# Patient Record
Sex: FEMALE | Race: AMERICAN INDIAN OR ALASKA NATIVE | URBAN - METROPOLITAN AREA
[De-identification: names, ages, dates, MRNs, and addresses within clinical notes are randomized per-mention and may not be internally consistent; named-entity substitution may affect disease eponyms.]

---

## 2017-03-03 ENCOUNTER — TRANSFERRED RECORDS (OUTPATIENT)
Dept: HEALTH INFORMATION MANAGEMENT | Facility: CLINIC | Age: 57
End: 2017-03-03

## 2017-05-09 ENCOUNTER — TRANSFERRED RECORDS (OUTPATIENT)
Dept: HEALTH INFORMATION MANAGEMENT | Facility: CLINIC | Age: 57
End: 2017-05-09

## 2017-06-12 ENCOUNTER — TRANSFERRED RECORDS (OUTPATIENT)
Dept: HEALTH INFORMATION MANAGEMENT | Facility: CLINIC | Age: 57
End: 2017-06-12

## 2017-06-23 ENCOUNTER — TRANSFERRED RECORDS (OUTPATIENT)
Dept: HEALTH INFORMATION MANAGEMENT | Facility: CLINIC | Age: 57
End: 2017-06-23

## 2017-07-17 ENCOUNTER — TRANSFERRED RECORDS (OUTPATIENT)
Dept: HEALTH INFORMATION MANAGEMENT | Facility: CLINIC | Age: 57
End: 2017-07-17

## 2017-08-14 ENCOUNTER — TELEPHONE (OUTPATIENT)
Dept: TRANSPLANT | Facility: CLINIC | Age: 57
End: 2017-08-14

## 2017-08-15 ENCOUNTER — TRANSFERRED RECORDS (OUTPATIENT)
Dept: HEALTH INFORMATION MANAGEMENT | Facility: CLINIC | Age: 57
End: 2017-08-15

## 2017-08-22 ENCOUNTER — MEDICAL CORRESPONDENCE (OUTPATIENT)
Dept: HEALTH INFORMATION MANAGEMENT | Facility: CLINIC | Age: 57
End: 2017-08-22

## 2017-09-20 ENCOUNTER — TRANSFERRED RECORDS (OUTPATIENT)
Dept: HEALTH INFORMATION MANAGEMENT | Facility: CLINIC | Age: 57
End: 2017-09-20
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
## 2017-12-02 ENCOUNTER — TRANSFERRED RECORDS (OUTPATIENT)
Dept: HEALTH INFORMATION MANAGEMENT | Facility: CLINIC | Age: 57
End: 2017-12-02

## 2017-12-02 PROBLEM — J42 ACUTE EXACERBATION OF CHRONIC BRONCHITIS (CMS/HCC): Status: ACTIVE | Noted: 2017-12-02

## 2017-12-02 PROBLEM — J96.20 ACUTE-ON-CHRONIC RESPIRATORY FAILURE (CMS/HCC): Status: ACTIVE | Noted: 2017-12-02

## 2017-12-02 PROBLEM — I95.9 LOW BLOOD PRESSURE: Status: ACTIVE | Noted: 2017-12-02

## 2017-12-02 PROBLEM — J18.9 PNEUMONIA: Status: ACTIVE | Noted: 2017-12-02

## 2017-12-02 PROBLEM — R09.02 HYPOXEMIA: Status: ACTIVE | Noted: 2017-12-02

## 2017-12-02 PROBLEM — J44.9 CHRONIC OBSTRUCTIVE LUNG DISEASE (CMS/HCC): Status: ACTIVE | Noted: 2017-12-02

## 2017-12-02 PROBLEM — J96.00 ACUTE RESPIRATORY FAILURE (CMS/HCC): Status: ACTIVE | Noted: 2017-12-02

## 2017-12-02 PROBLEM — N39.0 URINARY TRACT INFECTIOUS DISEASE: Status: ACTIVE | Noted: 2017-12-02

## 2017-12-02 PROBLEM — J20.9 ACUTE EXACERBATION OF CHRONIC BRONCHITIS (CMS/HCC): Status: ACTIVE | Noted: 2017-12-02

## 2017-12-02 PROBLEM — J44.1 COPD EXACERBATION (CMS/HCC): Status: ACTIVE | Noted: 2017-12-02

## 2017-12-02 PROBLEM — J40 BRONCHITIS: Status: ACTIVE | Noted: 2017-12-02

## 2017-12-02 PROBLEM — E87.20 LACTIC ACIDOSIS: Status: ACTIVE | Noted: 2017-12-02

## 2017-12-02 PROBLEM — N20.0 KIDNEY STONE: Status: ACTIVE | Noted: 2017-12-02

## 2017-12-02 PROBLEM — R65.10 SYSTEMIC INFLAMMATORY RESPONSE SYNDROME (CMS/HCC): Status: ACTIVE | Noted: 2017-12-02

## 2018-01-08 ENCOUNTER — TRANSFERRED RECORDS (OUTPATIENT)
Dept: HEALTH INFORMATION MANAGEMENT | Facility: CLINIC | Age: 58
End: 2018-01-08

## 2018-01-09 ENCOUNTER — TRANSFERRED RECORDS (OUTPATIENT)
Dept: HEALTH INFORMATION MANAGEMENT | Facility: CLINIC | Age: 58
End: 2018-01-09

## 2018-01-16 ENCOUNTER — DOCUMENTATION (OUTPATIENT)
Dept: PULMONOLOGY | Facility: CLINIC | Age: 58
End: 2018-01-16

## 2018-01-17 NOTE — PROGRESS NOTES
Patient was an inpatient in [facility].  Appears that she was treated with ceftriaxone hypertonic saline nebs continuation of budesonide and albuterol.  Theophylline was continued.  She was given a prednisone taper.  Is also on T ectropium duo nebs.  As well as Advair.  Metabolic panel showed a glucose of 115 BUN 20 creatinine 0.58 sodium 144 potassium 4.2 chloride 100 bicarbonate 36.  White count was 10,200 hemoglobin 10.8 hematocrit 38.8 platelets were 284,000.    Chest x-ray showed some patchy scarring in the left upper lobe and linear scar versus atelectasis in the lung bases.

## 2018-02-06 ENCOUNTER — TELEPHONE (OUTPATIENT)
Dept: PULMONOLOGY | Facility: CLINIC | Age: 58
End: 2018-02-06

## 2018-02-06 DIAGNOSIS — J44.9 CHRONIC OBSTRUCTIVE PULMONARY DISEASE, UNSPECIFIED COPD TYPE (CMS/HCC): Primary | ICD-10-CM

## 2018-02-06 RX ORDER — PREDNISONE 10 MG/1
TABLET ORAL
Qty: 28 TABLET | Refills: 0 | Status: SHIPPED | OUTPATIENT
Start: 2018-02-06 | End: 2018-02-08 | Stop reason: ALTCHOICE

## 2018-02-06 NOTE — PROGRESS NOTES
Pt calls in today. Reports that she has finished the prednisone given to her in [place] in January. She has been feeling worse since. She is quite short of breath, weak, and tired. She wears 3-5 L of oxygen at rest. Apparently she has a tank that goes up to 15LPM which she wears with a mask when getting up to go to the bathroom.   Pt refuses to come into the clinic today or tomorrow. She states her children will  prescriptions for her at the pharmacy. She lives in [place]. I will start her on prednisone 40mg Qday and talk to Dr. Baker tomorrow when he is back in the clinic.

## 2018-02-08 ENCOUNTER — HOSPITAL ENCOUNTER (INPATIENT)
Facility: HOSPITAL | Age: 58
LOS: 9 days | Discharge: HOME | DRG: 189 | End: 2018-02-17
Attending: EMERGENCY MEDICINE | Admitting: INTERNAL MEDICINE
Payer: COMMERCIAL

## 2018-02-08 ENCOUNTER — APPOINTMENT (OUTPATIENT)
Dept: RADIOLOGY | Facility: HOSPITAL | Age: 58
DRG: 189 | End: 2018-02-08
Payer: COMMERCIAL

## 2018-02-08 ENCOUNTER — TRANSFERRED RECORDS (OUTPATIENT)
Dept: HEALTH INFORMATION MANAGEMENT | Facility: CLINIC | Age: 58
End: 2018-02-08

## 2018-02-08 DIAGNOSIS — B37.0 THRUSH: ICD-10-CM

## 2018-02-08 DIAGNOSIS — M25.50 ARTHRALGIA, UNSPECIFIED JOINT: ICD-10-CM

## 2018-02-08 DIAGNOSIS — F41.9 ANXIETY: ICD-10-CM

## 2018-02-08 DIAGNOSIS — J44.1 COPD WITH ACUTE EXACERBATION (CMS/HCC): ICD-10-CM

## 2018-02-08 DIAGNOSIS — I10 ESSENTIAL HYPERTENSION: ICD-10-CM

## 2018-02-08 DIAGNOSIS — J44.1 COPD EXACERBATION (CMS/HCC): Primary | ICD-10-CM

## 2018-02-08 LAB
ALBUMIN SERPL-MCNC: 4 G/DL (ref 3.5–5.3)
ALP SERPL-CCNC: 69 U/L (ref 41–108)
ALT SERPL-CCNC: 26 U/L (ref 0–52)
ANION GAP SERPL CALC-SCNC: 8 MMOL/L (ref 3–11)
ANISOCYTOSIS PRESENCE IN BLOOD, ANALYZER: ABNORMAL
AST SERPL-CCNC: 15 U/L (ref 0–39)
B PERT DNA SPEC QL NAA+PROBE: NEGATIVE
BASE EXCESS BLDA CALC-SCNC: 2.7 MMOL/L (ref -2–2)
BASOPHILS # BLD AUTO: 0 10*3/UL
BASOPHILS NFR BLD AUTO: 0 % (ref 0–2)
BILIRUB SERPL-MCNC: 0.34 MG/DL (ref 0–1.4)
BUN SERPL-MCNC: 17 MG/DL (ref 7–25)
C PNEUM DNA SPEC QL NAA+PROBE: NEGATIVE
CALCIUM ALBUM COR SERPL-MCNC: 9.5 MG/DL (ref 8.5–10.1)
CALCIUM SERPL-MCNC: 9.5 MG/DL (ref 8.6–10.3)
CHLORIDE SERPL-SCNC: 103 MMOL/L (ref 98–107)
CO2 BLDA-SCNC: 29.5 MMOL/L (ref 19–24)
CO2 SERPL-SCNC: 30 MMOL/L (ref 21–32)
CREAT SERPL-MCNC: 0.7 MG/DL (ref 0.6–1.2)
DEVICE (RT): ABNORMAL
EOSINOPHIL # BLD AUTO: 0 10*3/UL
EOSINOPHIL NFR BLD AUTO: 0 % (ref 0–3)
ERYTHROCYTE [DISTWIDTH] IN BLOOD BY AUTOMATED COUNT: 17.3 % (ref 11.5–14)
FLOW: 6 L/M
FLUAV RNA SPEC NAA+PROBE-ACNC: NEGATIVE
FLUBV RNA SPEC QL NAA+PROBE: NEGATIVE
GFR SERPL CREATININE-BSD FRML MDRD: 87 ML/MIN/1.73M*2
GLUCOSE SERPL-MCNC: 144 MG/DL (ref 70–105)
HADV DNA SPEC QL NAA+PROBE: NEGATIVE
HCO3 BLDA-SCNC: 28 MMOL/L (ref 23–29)
HCOV 229E RNA SPEC QL NAA+PROBE: NEGATIVE
HCOV HKU1 RNA SPEC QL NAA+PROBE: NEGATIVE
HCOV NL63 RNA SPEC QL NAA+PROBE: NEGATIVE
HCOV OC43 RNA SPEC QL NAA+PROBE: NEGATIVE
HCT VFR BLD AUTO: 40.3 % (ref 34–45)
HGB BLD-MCNC: 13.8 G/DL (ref 11.5–15.5)
HMPV RNA ISLT QL NAA+PROBE: NEGATIVE
HPIV1 RNA SPEC QL NAA+PROBE: NEGATIVE
HPIV2 RNA SPEC QL NAA+PROBE: NEGATIVE
HPIV3 RNA SPEC QL NAA+PROBE: NEGATIVE
HPIV4 RNA SPEC QL NAA+PROBE: NEGATIVE
LACTATE BLDV-SCNC: 0.75 MMOL/L (ref 0.5–1.99)
LYMPHOCYTES # BLD AUTO: 0.5 10*3/UL
LYMPHOCYTES NFR BLD AUTO: 6 % (ref 11–47)
M PNEUMO DNA # SPEC NAA+PROBE: NEGATIVE {COPIES}/ML
MCH RBC QN AUTO: 28 PG (ref 28–33)
MCHC RBC AUTO-ENTMCNC: 34.1 G/DL (ref 32–36)
MCV RBC AUTO: 82.1 FL (ref 81–97)
MODE (RT): ABNORMAL
MONOCYTES # BLD AUTO: 0.4 10*3/UL
MONOCYTES NFR BLD AUTO: 5 % (ref 3–11)
NEUTROPHILS # BLD AUTO: 7.8 10*3/UL
NEUTROPHILS NFR BLD AUTO: 89 % (ref 41–81)
PCO2 BLDA: 46.4 MMHG (ref 35–45)
PEEP SET (RT): 6 CM/H2O
PH BLDA: 7.4 [PH] (ref 7.35–7.45)
PIP SET (RT): 12 CM/H2O
PLATELET # BLD AUTO: 271 10*3/UL (ref 140–350)
PMV BLD AUTO: 6.4 FL (ref 6.9–10.8)
PO2 BLDA: 91.6 MMHG (ref 60–80)
POCT CTO2, ARTERIAL: 17.6 ML/DL (ref 15–24)
POCT HEMOGLOBIN (MEASURED), ARTERIAL: 13 G/DL (ref 11.5–15.5)
POCT OXYHEMOGLOBIN, ARTERIAL: 96 % (ref 94–97)
POTASSIUM SERPL-SCNC: 4.3 MMOL/L (ref 3.5–5.1)
PROT SERPL-MCNC: 6.8 G/DL (ref 6–8.3)
RBC # BLD AUTO: 4.9 10*6/ΜL (ref 3.7–5.3)
RR TOTAL (RT): 30 B/MIN
RSV A RNA SPEC QL NAA+PROBE: POSITIVE
RV+EV RNA SPEC QL NAA+PROBE: NEGATIVE
SODIUM SERPL-SCNC: 141 MMOL/L (ref 135–145)
SPO2 (RT): 97 %
WBC # BLD AUTO: 8.8 10*3/UL (ref 4.5–10.5)

## 2018-02-08 PROCEDURE — 94664 DEMO&/EVAL PT USE INHALER: CPT

## 2018-02-08 PROCEDURE — 71045 X-RAY EXAM CHEST 1 VIEW: CPT

## 2018-02-08 PROCEDURE — 94644 CONT INHLJ TX 1ST HOUR: CPT

## 2018-02-08 PROCEDURE — 6370000100 HC RX 637 (ALT 250 FOR IP): Performed by: EMERGENCY MEDICINE

## 2018-02-08 PROCEDURE — 94640 AIRWAY INHALATION TREATMENT: CPT

## 2018-02-08 PROCEDURE — 2580000300 HC RX 258: Performed by: EMERGENCY MEDICINE

## 2018-02-08 PROCEDURE — 87633 RESP VIRUS 12-25 TARGETS: CPT | Performed by: EMERGENCY MEDICINE

## 2018-02-08 PROCEDURE — 6360000200 HC RX 636 W HCPCS (ALT 250 FOR IP)

## 2018-02-08 PROCEDURE — 36415 COLL VENOUS BLD VENIPUNCTURE: CPT | Performed by: EMERGENCY MEDICINE

## 2018-02-08 PROCEDURE — 80053 COMPREHEN METABOLIC PANEL: CPT | Performed by: EMERGENCY MEDICINE

## 2018-02-08 PROCEDURE — (BLANK) HC ROOM ICU INTERMEDIATE TRANSITIONAL CARE

## 2018-02-08 PROCEDURE — 36600 WITHDRAWAL OF ARTERIAL BLOOD: CPT

## 2018-02-08 PROCEDURE — 93005 ELECTROCARDIOGRAM TRACING: CPT

## 2018-02-08 PROCEDURE — 94660 CPAP INITIATION&MGMT: CPT

## 2018-02-08 PROCEDURE — 83605 ASSAY OF LACTIC ACID: CPT

## 2018-02-08 PROCEDURE — 99223 1ST HOSP IP/OBS HIGH 75: CPT | Mod: AI | Performed by: INTERNAL MEDICINE

## 2018-02-08 PROCEDURE — 85025 COMPLETE CBC W/AUTO DIFF WBC: CPT | Performed by: EMERGENCY MEDICINE

## 2018-02-08 PROCEDURE — 82805 BLOOD GASES W/O2 SATURATION: CPT | Mod: QW

## 2018-02-08 PROCEDURE — 6360000200 HC RX 636 W HCPCS (ALT 250 FOR IP): Performed by: EMERGENCY MEDICINE

## 2018-02-08 RX ORDER — LEVALBUTEROL 1.25 MG/.5ML
2.5 SOLUTION, CONCENTRATE RESPIRATORY (INHALATION) ONCE
Status: COMPLETED | OUTPATIENT
Start: 2018-02-08 | End: 2018-02-08

## 2018-02-08 RX ORDER — LEVALBUTEROL 1.25 MG/.5ML
SOLUTION, CONCENTRATE RESPIRATORY (INHALATION)
Status: DISCONTINUED
Start: 2018-02-08 | End: 2018-02-17 | Stop reason: HOSPADM

## 2018-02-08 RX ORDER — POLYVINYL ALCOHOL 14 MG/ML
1-2 SOLUTION/ DROPS OPHTHALMIC AS NEEDED
Status: DISCONTINUED | OUTPATIENT
Start: 2018-02-08 | End: 2018-02-12

## 2018-02-08 RX ORDER — GUAIFENESIN 600 MG/1
600 TABLET, EXTENDED RELEASE ORAL 2 TIMES DAILY
Status: ON HOLD | COMMUNITY
End: 2018-02-17

## 2018-02-08 RX ORDER — ONDANSETRON HYDROCHLORIDE 2 MG/ML
4 INJECTION, SOLUTION INTRAVENOUS EVERY 6 HOURS PRN
Status: DISCONTINUED | OUTPATIENT
Start: 2018-02-08 | End: 2018-02-09

## 2018-02-08 RX ORDER — MAGNESIUM SULFATE HEPTAHYDRATE 40 MG/ML
2 INJECTION, SOLUTION INTRAVENOUS ONCE AS NEEDED
Status: DISCONTINUED | OUTPATIENT
Start: 2018-02-08 | End: 2018-02-17 | Stop reason: HOSPADM

## 2018-02-08 RX ORDER — SODIUM CHLORIDE 9 MG/ML
500 INJECTION, SOLUTION INTRAVENOUS ONCE
Status: COMPLETED | OUTPATIENT
Start: 2018-02-08 | End: 2018-02-08

## 2018-02-08 RX ORDER — IPRATROPIUM BROMIDE AND ALBUTEROL SULFATE 2.5; .5 MG/3ML; MG/3ML
9 SOLUTION RESPIRATORY (INHALATION) ONCE
Status: COMPLETED | OUTPATIENT
Start: 2018-02-08 | End: 2018-02-08

## 2018-02-08 RX ADMIN — LEVALBUTEROL 2.5 MG: 1.25 SOLUTION, CONCENTRATE RESPIRATORY (INHALATION) at 18:00

## 2018-02-08 RX ADMIN — LEVALBUTEROL 2.5 MG: 1.25 SOLUTION, CONCENTRATE RESPIRATORY (INHALATION) at 16:47

## 2018-02-08 RX ADMIN — METHYLPREDNISOLONE SODIUM SUCCINATE 125 MG: 125 INJECTION, POWDER, FOR SOLUTION INTRAMUSCULAR; INTRAVENOUS at 18:56

## 2018-02-08 RX ADMIN — IPRATROPIUM BROMIDE AND ALBUTEROL SULFATE 9 ML: .5; 3 SOLUTION RESPIRATORY (INHALATION) at 15:52

## 2018-02-08 RX ADMIN — SODIUM CHLORIDE 1000 ML: 9 INJECTION, SOLUTION INTRAVENOUS at 17:21

## 2018-02-08 RX ADMIN — METHYLPREDNISOLONE SODIUM SUCCINATE 125 MG: 125 INJECTION, POWDER, FOR SOLUTION INTRAMUSCULAR; INTRAVENOUS at 15:44

## 2018-02-08 NOTE — MEDICATION HISTORY SPECIALIST NOTES
CSN: [id number]  : [id number]    Information sources:  EPIC      Source verified by: patient in ER    Allergies verified. Patient in ER      Patient unable to take all of her medication, was only able to use 1/2 of nebulizing solution this morning

## 2018-02-08 NOTE — ED PROVIDER NOTES
Shortness of Breath (cough, congestion for 1 week, worsening shortness of breath today)        HPI:    Patient is a 57 y.o. female who presents with shortness of breath for the last week.  The patient has a history of COPD.  She denies a history of heart failure.  She states that her symptoms started gradually Time of  onset 1 week and is aggravated by nothing and alleviated by nothing.  Her symptom is associated with fast heart rate.      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     Social History Main Topics   • Smoking status: Former Smoker     Packs/day: 1.00     Quit date:    • Smokeless tobacco: Former User   • Alcohol use No   • Drug use: No   • Sexual activity: Defer     Other Topics Concern   • Not on file     Social History Narrative   • No narrative on file       Family History   Problem Relation Age of Onset   • Diabetes Mother    • Hypertension Father    • Cataracts Father    • Other Father      Benign prostatic hypertrophy witout outflow obstruction   • Heart disease Brother         Allergies   Allergen Reactions   • Diphenhydramine      DENIES   • Cefuroxime      SUNBURN   • Cefuroxime Axetil    • Diphenhydramine Hcl    • Erythromycin Lactobionate    • Methylphenidate      ON FIRE   • Metoclopramide      dizzy, sweating   • Propoxyphene    • Propoxyphene N-Acetaminophen    • Tramadol      PARALYSIS   • Codeine      oxygen gets really low   • Erythromycin Base      GI UPSET   • Ibuprofen      DENIES   • Latex      DRY MOUTH         Current Outpatient Prescriptions:   •  albuterol 2.5 mg /3 mL nebulizer solution, Take 3 mL (2.5 mg total) by nebulization every 6 (six) hours as needed for wheezing., Disp: 75 mL, Rfl: 1  •  alendronate (FOSAMAX) 70 mg tablet, Take 70 mg by mouth every 7 (seven) days. THURSDAY , Disp: , Rfl:   •  budesonide (PULMICORT) 1 mg/2 mL nebulizer solution, Take 2 mL by nebulization 2 (two) times a day.  , Disp:  mL, Rfl:   •  ergocalciferol (VITAMIN D2) 50,000 unit capsule, Take 50,000 Units by mouth once a week. THURSDAY, Disp: , Rfl:   •  fexofenadine (ALLEGRA) 60 mg tablet, Take 60 mg by mouth 3 (three) times a day.  , Disp: , Rfl:   •  fluticasone (FLONASE) 50 mcg/actuation nasal spray, Administer 1 spray into each nostril 3 (three) times a day.  , Disp: , Rfl:   •  fluticasone-salmeterol (ADVAIR DISKUS) 500-50 mcg/dose diskus inhaler, Inhale 2 (two) times a day.  , Disp: , Rfl:   •  montelukast (SINGULAIR) 10 mg tablet, Take 10 mg by mouth nightly.  , Disp: , Rfl:   •  multivitamin (THERAGRAN) tablet tablet, Take 1 tablet by mouth daily with lunch.  , Disp: , Rfl:   •  omeprazole (PriLOSEC) 20 mg capsule, Take 20 mg by mouth 2 (two) times a day.  , Disp: , Rfl:   •  predniSONE (DELTASONE) 10 mg tablet, Take 10 mg by mouth daily as needed. Left over prednisone , Disp: , Rfl:   •  roflumilast (DALIRESP) 500 mcg tablet, Take 500 mcg by mouth daily with lunch., Disp: , Rfl:   •  theophylline (THEODUR) 200 mg 12 hr tablet, Take 200 mg by mouth 2 (two) times a day.  ,  Disp: , Rfl:   •  ascorbic acid, vitamin C, (VITAMIN C) 500 mg tablet, Take 500 mg by mouth 2 (two) times a day.  , Disp: , Rfl:   •  docusate sodium (COLACE) 100 mg capsule, Take 100 mg by mouth 2 (two) times a day. Takes after lunch and at bedtime , Disp: , Rfl:   •  guaiFENesin (MUCINEX) 600 mg 12 hr tablet, Take 600 mg by mouth 2 (two) times a day., Disp: , Rfl:   •  tiotropium (SPIRIVA WITH HANDIHALER) 1 capsule = per inhalation capsule, Place 1 capsule into inhaler and inhale every morning., Disp: , Rfl:       ROS:  Constitutional: negative for fever  Eyes: negative for eye pain  ENT: negative for sore throat  Cardiovascular: negative for chest pain  Respiratory: negative for hemoptysis  GI: negative for abdominal pain  : negative for hematuria  Musculoskeletal: negative for back pain  Neuro: negative for headache  Hematology: negative for bleeding  Immunology: negative for joint pain      ED Triage Vitals   Temp Heart Rate Resp BP SpO2   02/08/18 1526 02/08/18 1526 02/08/18 1526 02/08/18 1526 02/08/18 1526   37.3 °C (99.1 °F) (!) 140 (!) 40 172/100 97 %      Temp Source Heart Rate Source Patient Position BP Location FiO2 (%)   02/08/18 1526 -- 02/08/18 1548 -- --   Oral  Head of bed 30 degrees or higher           Physical Exam:  Nursing note and vitals reviewed.  Constitutional: appears well-developed, in obvious respiratory distress.   HENT:   Head: Normocephalic and atraumatic.   Eyes: Pupils are equal, round, and reactive to light.   Neck: Supple, no lymphadenopathy  Cardiovascular: Regular rate and rhythm with no murmur, rub, or gallop.  Normal pulses.  Pulmonary/Chest: severe respiratory distress.  Decreased breath sounds, inspiratory and expiratory wheezing  Abdominal: Soft and nontender.    Back: No CVA tenderness.  Musculoskeletal: No edema  Neurological: Alert.   Skin: Skin is warm and dry. No rash noted.   Psychiatric: Normal mood and affect.           Labs:  Labs Reviewed   CBC WITH AUTO  "DIFFERENTIAL - Abnormal        Result Value    WBC 8.8      RBC 4.90      Hemoglobin 13.8      Hematocrit 40.3      MCV 82.1      MCH 28.0      MCHC 34.1      RDW 17.3 (*)     Platelets 271      MPV 6.4 (*)     Neutrophils% 89 (*)     Lymphocytes% 6 (*)     Monocytes% 5      Eosinophils% 0      Basophils% 0      Neutrophils Absolute 7.80      Lymphocytes Absolute 0.50      Monocytes Absolute 0.40      Eosinophils Absolute 0.00      Basophils Absolute 0.00      Anisocytosis 1+     COMPREHENSIVE METABOLIC PANEL - Abnormal     Sodium 141      Potassium 4.3      Chloride 103      CO2 30      Anion Gap 8      BUN 17      Creatinine 0.7      Glucose 144 (*)     Calcium 9.5      AST 15      ALT (SGPT) 26      Alkaline Phosphatase 69      Total Protein 6.8      Albumin 4.0      Total Bilirubin 0.34      eGFR 87      Corrected Calcium 9.5      Narrative:     ESTIMATED GFR CALCULATED USING THE IDMS-TRACEABLE MDRD STUDY EQUATION WITH THE RESULT NORMALIZED TO 1.73M^2 BODY SURFACE AREA.\"    AVERAGE GFR BY AGE RANGE    20 - 30                                  116  30 - 40                                  107  40 - 50                                  99  50 - 60                                  93  60 - 70                                  85  70 - up                                  75   POCT LACTIC ACID (LACTATE) VENOUS RESULTS ONLY - Normal    POC Lactate 0.75     POCT LACTIC ACID (LACTATE)   RESPIRATORY PATHOGEN PANEL, PCR         Imaging:  X-ray chest portable 1 view   Final Result    Emphysema.                MDM:    The patient is in obvious respiratory distress.  3 DuoNeb's will be given she will be given Solu-Medrol chest x-ray and labs will be done.  She will require admission.    The high probability of sudden, clinically significant, or life-threatening deterioration required my full and direct attention, intervention, and personal management while the patient was critical.  I provided critical care services including " those noted below.    30 minutes in addition to separately billable procedures.    The patient was still quite tachypneic after 3 DuoNeb's and given a Xopenex neb.  The case was discussed with the hospitalist who will admit her.    Given   Medications   levalbuterol (XOPENEX) 1.25 mg/0.5 mL nebulizer solution (not administered)   ipratropium-albuterol (DUO-NEB) 0.5-2.5 mg/3 mL nebulizer solution 9 mL (9 mL nebulization Given 2/8/18 5632)   methylPREDNISolone sod suc(PF) (Solu-MEDROL) injection 125 mg (125 mg intravenous Given 2/8/18 1544)   levalbuterol (XOPENEX) 1.25 mg/0.5 mL nebulizer solution 2.5 mg (2.5 mg nebulization Given 2/8/18 1647)       ED Course          ECG 12 lead, Shortness of breath  Date/Time: 2/8/2018 3:39 PM  Performed by: HUA CARRASCO  Authorized by: HUA CARRASCO     Comments:      Sinus tachycardia rate of 142 wandering baseline, extrasystoles              Clinical Impression:  Final diagnoses:   [J44.1] COPD exacerbation (CMS/[facility])           A voice recognition program was used to aid in documentation of this record.  Sometimes words are not printed exactly as they were spoken.  While efforts were made to carefully edit and correct any inaccuracies, some areas may be present; please take these into context.  Please contact the provider if areas are identified.         Hua Carrasco MD  02/08/18 0301

## 2018-02-08 NOTE — Clinical Note
Level of Care: Intensive Care Transitional [23]   Admitting Provider: JC PATTERSON [[id number]]   Special bed requests:: Telemetry

## 2018-02-09 LAB
ALBUMIN SERPL-MCNC: 3.5 G/DL (ref 3.5–5.3)
ALP SERPL-CCNC: 63 U/L (ref 41–108)
ALT SERPL-CCNC: 22 U/L (ref 0–52)
ANION GAP SERPL CALC-SCNC: 11 MMOL/L (ref 3–11)
ANISOCYTOSIS PRESENCE IN BLOOD, ANALYZER: ABNORMAL
AST SERPL-CCNC: 11 U/L (ref 0–39)
BASE EXCESS BLDA CALC-SCNC: 5.9 MMOL/L (ref -2–2)
BASOPHILS # BLD AUTO: 0 10*3/UL
BASOPHILS # BLD AUTO: 0 10*3/UL
BASOPHILS NFR BLD AUTO: 0 % (ref 0–2)
BASOPHILS NFR BLD AUTO: 0 % (ref 0–2)
BILIRUB SERPL-MCNC: 0.32 MG/DL (ref 0–1.4)
BUN SERPL-MCNC: 21 MG/DL (ref 7–25)
CALCIUM ALBUM COR SERPL-MCNC: 2.4 MG/DL (ref 1.8–2.4)
CALCIUM ALBUM COR SERPL-MCNC: 9.2 MG/DL (ref 8.5–10.1)
CALCIUM SERPL-MCNC: 8.8 MG/DL (ref 8.6–10.3)
CHLORIDE SERPL-SCNC: 102 MMOL/L (ref 98–107)
CO2 BLDA-SCNC: 33.6 MMOL/L (ref 19–24)
CO2 SERPL-SCNC: 29 MMOL/L (ref 21–32)
CREAT SERPL-MCNC: 0.6 MG/DL (ref 0.6–1.2)
DEVICE (RT): ABNORMAL
EOSINOPHIL # BLD AUTO: 0 10*3/UL
EOSINOPHIL # BLD AUTO: 0 10*3/UL
EOSINOPHIL NFR BLD AUTO: 0 % (ref 0–3)
EOSINOPHIL NFR BLD AUTO: 0 % (ref 0–3)
ERYTHROCYTE [DISTWIDTH] IN BLOOD BY AUTOMATED COUNT: 17.3 % (ref 11.5–14)
ERYTHROCYTE [DISTWIDTH] IN BLOOD BY AUTOMATED COUNT: 17.5 % (ref 11.5–14)
ERYTHROCYTE [DISTWIDTH] IN BLOOD BY AUTOMATED COUNT: 17.5 % (ref 11.5–14)
FIO2: 100 %
GFR SERPL CREATININE-BSD FRML MDRD: 104 ML/MIN/1.73M*2
GLUCOSE BLDC GLUCOMTR-MCNC: 132 MG/DL (ref 70–105)
GLUCOSE BLDC GLUCOMTR-MCNC: 155 MG/DL (ref 70–105)
GLUCOSE BLDC GLUCOMTR-MCNC: 163 MG/DL (ref 70–105)
GLUCOSE BLDC GLUCOMTR-MCNC: 192 MG/DL (ref 70–105)
GLUCOSE SERPL-MCNC: 149 MG/DL (ref 70–105)
HCO3 BLDA-SCNC: 32 MMOL/L (ref 23–29)
HCT VFR BLD AUTO: 37.4 % (ref 34–45)
HCT VFR BLD AUTO: 37.4 % (ref 34–45)
HCT VFR BLD AUTO: 37.9 % (ref 34–45)
HGB BLD-MCNC: 12.2 G/DL (ref 11.5–15.5)
HGB BLD-MCNC: 12.3 G/DL (ref 11.5–15.5)
HGB BLD-MCNC: 12.3 G/DL (ref 11.5–15.5)
HYPOCHROMIA PRESENCE IN BLOOD, ANALYZER: ABNORMAL
HYPOCHROMIA PRESENCE IN BLOOD, ANALYZER: ABNORMAL
LYMPHOCYTES # BLD AUTO: 0.8 10*3/UL
LYMPHOCYTES # BLD AUTO: 1 10*3/UL
LYMPHOCYTES # BLD MANUAL: 0.6 10*3/UL
LYMPHOCYTES NFR BLD AUTO: 10 % (ref 11–47)
LYMPHOCYTES NFR BLD AUTO: 7 % (ref 11–47)
LYMPHOCYTES NFR BLD MANUAL: 5 % (ref 11–47)
MAGNESIUM SERPL-MCNC: 2.4 MG/DL (ref 1.8–2.4)
MCH RBC QN AUTO: 26.7 PG (ref 28–33)
MCH RBC QN AUTO: 26.9 PG (ref 28–33)
MCH RBC QN AUTO: 27.4 PG (ref 28–33)
MCHC RBC AUTO-ENTMCNC: 32.4 G/DL (ref 32–36)
MCHC RBC AUTO-ENTMCNC: 32.6 G/DL (ref 32–36)
MCHC RBC AUTO-ENTMCNC: 32.9 G/DL (ref 32–36)
MCV RBC AUTO: 82.5 FL (ref 81–97)
MCV RBC AUTO: 82.7 FL (ref 81–97)
MCV RBC AUTO: 83.1 FL (ref 81–97)
MODE (RT): ABNORMAL
MONOCYTES # BLD AUTO: 0.4 10*3/UL
MONOCYTES # BLD AUTO: 0.6 10*3/UL
MONOCYTES # BLD MANUAL: 0.5 10*3/UL
MONOCYTES NFR BLD AUTO: 4 % (ref 3–11)
MONOCYTES NFR BLD AUTO: 5 % (ref 3–11)
MONOCYTES NFR BLD MANUAL: 4 % (ref 3–11)
MV (RT): 12.1 L/MIN
NEUTROPHILS # BLD AUTO: 11.7 10*3/UL
NEUTROPHILS # BLD AUTO: 7 10*3/UL
NEUTROPHILS # BLD MANUAL: 11.56 10*3/UL
NEUTROPHILS NFR BLD AUTO: 86 % (ref 41–81)
NEUTROPHILS NFR BLD AUTO: 88 % (ref 41–81)
NEUTS SEG # BLD MANUAL: 11.6 10*3/UL
NEUTS SEG NFR BLD MANUAL: 91 % (ref 41–81)
PCO2 BLDA: 53.3 MMHG (ref 35–45)
PEEP SET (RT): 5 CM/H2O
PH BLDA: 7.4 [PH] (ref 7.35–7.45)
PHOSPHATE SERPL-MCNC: 3.4 MG/DL (ref 2.5–4.9)
PLATELET # BLD AUTO: 251 10*3/UL (ref 140–350)
PLATELET # BLD AUTO: 263 10*3/UL (ref 140–350)
PLATELET # BLD AUTO: 277 10*3/UL (ref 140–350)
PLATELET # BLD EST: ADEQUATE 10*3/UL
PMV BLD AUTO: 6.3 FL (ref 6.9–10.8)
PMV BLD AUTO: 6.6 FL (ref 6.9–10.8)
PMV BLD AUTO: 6.7 FL (ref 6.9–10.8)
PO2 BLDA: 353.7 MMHG (ref 60–80)
POCT CTO2, ARTERIAL: 17.3 ML/DL (ref 15–24)
POCT HEMOGLOBIN (MEASURED), ARTERIAL: 11.7 G/DL (ref 11.5–15.5)
POCT OXYHEMOGLOBIN, ARTERIAL: 99 % (ref 94–97)
POTASSIUM SERPL-SCNC: 3.9 MMOL/L (ref 3.5–5.1)
PROT SERPL-MCNC: 5.9 G/DL (ref 6–8.3)
PS (RT): 8 CM/H2O
RBC # BLD AUTO: 4.5 10*6/ΜL (ref 3.7–5.3)
RBC # BLD AUTO: 4.53 10*6/ΜL (ref 3.7–5.3)
RBC # BLD AUTO: 4.59 10*6/ΜL (ref 3.7–5.3)
RBC MORPH BLD: NORMAL
RR TOTAL (RT): 23 B/MIN
SODIUM SERPL-SCNC: 142 MMOL/L (ref 135–145)
SPO2 (RT): 100 %
TOTAL CELLS COUNTED BLD: 100 CELLS
VT OBSERVED (RT): 546 ML
WBC # BLD AUTO: 12.7 10*3/UL (ref 4.5–10.5)
WBC # BLD AUTO: 13.3 10*3/UL (ref 4.5–10.5)
WBC # BLD AUTO: 8.1 10*3/UL (ref 4.5–10.5)
WBC NRBC COR # BLD: 12.7 10*3/UL

## 2018-02-09 PROCEDURE — 36569 INSJ PICC 5 YR+ W/O IMAGING: CPT

## 2018-02-09 PROCEDURE — 85025 COMPLETE CBC W/AUTO DIFF WBC: CPT | Performed by: INTERNAL MEDICINE

## 2018-02-09 PROCEDURE — 96376 TX/PRO/DX INJ SAME DRUG ADON: CPT

## 2018-02-09 PROCEDURE — 94660 CPAP INITIATION&MGMT: CPT

## 2018-02-09 PROCEDURE — 6360000200 HC RX 636 W HCPCS (ALT 250 FOR IP)

## 2018-02-09 PROCEDURE — 94002 VENT MGMT INPAT INIT DAY: CPT

## 2018-02-09 PROCEDURE — 6360000200 HC RX 636 W HCPCS (ALT 250 FOR IP): Performed by: INTERNAL MEDICINE

## 2018-02-09 PROCEDURE — 97535 SELF CARE MNGMENT TRAINING: CPT

## 2018-02-09 PROCEDURE — 80053 COMPREHEN METABOLIC PANEL: CPT | Performed by: INTERNAL MEDICINE

## 2018-02-09 PROCEDURE — 2580000300 HC RX 258: Performed by: INTERNAL MEDICINE

## 2018-02-09 PROCEDURE — 99291 CRITICAL CARE FIRST HOUR: CPT | Performed by: INTERNAL MEDICINE

## 2018-02-09 PROCEDURE — 96374 THER/PROPH/DIAG INJ IV PUSH: CPT

## 2018-02-09 PROCEDURE — 05HY33Z INSERTION OF INFUSION DEVICE INTO UPPER VEIN, PERCUTANEOUS APPROACH: ICD-10-PCS | Performed by: INTERNAL MEDICINE

## 2018-02-09 PROCEDURE — 6370000100 HC RX 637 (ALT 250 FOR IP)

## 2018-02-09 PROCEDURE — 36415 COLL VENOUS BLD VENIPUNCTURE: CPT | Performed by: INTERNAL MEDICINE

## 2018-02-09 PROCEDURE — 94645 CONT INHLJ TX EACH ADDL HOUR: CPT

## 2018-02-09 PROCEDURE — (BLANK) HC ROOM ICU INTERMEDIATE TRANSITIONAL CARE

## 2018-02-09 PROCEDURE — 99284 EMERGENCY DEPT VISIT MOD MDM: CPT

## 2018-02-09 PROCEDURE — 82962 GLUCOSE BLOOD TEST: CPT

## 2018-02-09 PROCEDURE — 97166 OT EVAL MOD COMPLEX 45 MIN: CPT

## 2018-02-09 PROCEDURE — 84100 ASSAY OF PHOSPHORUS: CPT | Performed by: INTERNAL MEDICINE

## 2018-02-09 PROCEDURE — 82805 BLOOD GASES W/O2 SATURATION: CPT | Mod: QW

## 2018-02-09 PROCEDURE — 6370000100 HC RX 637 (ALT 250 FOR IP): Performed by: INTERNAL MEDICINE

## 2018-02-09 PROCEDURE — 94640 AIRWAY INHALATION TREATMENT: CPT

## 2018-02-09 PROCEDURE — 83735 ASSAY OF MAGNESIUM: CPT | Performed by: INTERNAL MEDICINE

## 2018-02-09 PROCEDURE — 36600 WITHDRAWAL OF ARTERIAL BLOOD: CPT

## 2018-02-09 PROCEDURE — 2580000300 HC RX 258: Performed by: NURSE PRACTITIONER

## 2018-02-09 PROCEDURE — 2590000100 HC RX 259: Performed by: INTERNAL MEDICINE

## 2018-02-09 PROCEDURE — 97163 PT EVAL HIGH COMPLEX 45 MIN: CPT | Mod: GP | Performed by: PHYSICAL THERAPIST

## 2018-02-09 PROCEDURE — 97530 THERAPEUTIC ACTIVITIES: CPT

## 2018-02-09 PROCEDURE — 97530 THERAPEUTIC ACTIVITIES: CPT | Mod: GP | Performed by: PHYSICAL THERAPIST

## 2018-02-09 RX ORDER — TRAZODONE HYDROCHLORIDE 50 MG/1
25 TABLET ORAL NIGHTLY PRN
Status: DISCONTINUED | OUTPATIENT
Start: 2018-02-09 | End: 2018-02-17 | Stop reason: HOSPADM

## 2018-02-09 RX ORDER — ACETAMINOPHEN 325 MG/1
325-650 TABLET ORAL EVERY 4 HOURS PRN
Status: DISCONTINUED | OUTPATIENT
Start: 2018-02-09 | End: 2018-02-13

## 2018-02-09 RX ORDER — PREDNISONE 20 MG/1
40 TABLET ORAL DAILY
Status: DISCONTINUED | OUTPATIENT
Start: 2018-02-09 | End: 2018-02-09

## 2018-02-09 RX ORDER — LEVALBUTEROL 1.25 MG/.5ML
1.25 SOLUTION, CONCENTRATE RESPIRATORY (INHALATION)
Status: DISCONTINUED | OUTPATIENT
Start: 2018-02-09 | End: 2018-02-17 | Stop reason: HOSPADM

## 2018-02-09 RX ORDER — IPRATROPIUM BROMIDE 0.5 MG/2.5ML
0.5 SOLUTION RESPIRATORY (INHALATION)
Status: DISCONTINUED | OUTPATIENT
Start: 2018-02-09 | End: 2018-02-17 | Stop reason: HOSPADM

## 2018-02-09 RX ORDER — LEVALBUTEROL 1.25 MG/.5ML
1.25 SOLUTION, CONCENTRATE RESPIRATORY (INHALATION) EVERY 4 HOURS PRN
Status: DISCONTINUED | OUTPATIENT
Start: 2018-02-09 | End: 2018-02-09

## 2018-02-09 RX ORDER — ADHESIVE BANDAGE
30 BANDAGE TOPICAL DAILY PRN
Status: DISCONTINUED | OUTPATIENT
Start: 2018-02-09 | End: 2018-02-17 | Stop reason: HOSPADM

## 2018-02-09 RX ORDER — HALOPERIDOL 5 MG/ML
5 INJECTION INTRAMUSCULAR EVERY 6 HOURS PRN
Status: DISCONTINUED | OUTPATIENT
Start: 2018-02-09 | End: 2018-02-17 | Stop reason: HOSPADM

## 2018-02-09 RX ORDER — SODIUM CHLORIDE 9 MG/ML
250 INJECTION, SOLUTION INTRAVENOUS ONCE
Status: COMPLETED | OUTPATIENT
Start: 2018-02-09 | End: 2018-02-10

## 2018-02-09 RX ORDER — ONDANSETRON HYDROCHLORIDE 2 MG/ML
4 INJECTION, SOLUTION INTRAVENOUS EVERY 6 HOURS PRN
Status: DISCONTINUED | OUTPATIENT
Start: 2018-02-09 | End: 2018-02-17 | Stop reason: HOSPADM

## 2018-02-09 RX ORDER — SODIUM CHLORIDE 0.9 % (FLUSH) 0.9 %
3 SYRINGE (ML) INJECTION AS NEEDED
Status: DISCONTINUED | OUTPATIENT
Start: 2018-02-09 | End: 2018-02-17 | Stop reason: HOSPADM

## 2018-02-09 RX ORDER — SODIUM CHLORIDE 9 MG/ML
50 INJECTION, SOLUTION INTRAVENOUS CONTINUOUS
Status: DISCONTINUED | OUTPATIENT
Start: 2018-02-09 | End: 2018-02-11

## 2018-02-09 RX ORDER — ALBUTEROL SULFATE 0.83 MG/ML
SOLUTION RESPIRATORY (INHALATION)
Status: COMPLETED
Start: 2018-02-09 | End: 2018-02-09

## 2018-02-09 RX ORDER — IPRATROPIUM BROMIDE 0.5 MG/2.5ML
0.5 SOLUTION RESPIRATORY (INHALATION) 3 TIMES DAILY
Status: DISCONTINUED | OUTPATIENT
Start: 2018-02-09 | End: 2018-02-09

## 2018-02-09 RX ORDER — BISACODYL 10 MG/1
10 SUPPOSITORY RECTAL DAILY PRN
Status: DISCONTINUED | OUTPATIENT
Start: 2018-02-09 | End: 2018-02-17 | Stop reason: HOSPADM

## 2018-02-09 RX ORDER — ONDANSETRON 4 MG/1
4 TABLET, FILM COATED ORAL EVERY 6 HOURS PRN
Status: DISCONTINUED | OUTPATIENT
Start: 2018-02-09 | End: 2018-02-17 | Stop reason: HOSPADM

## 2018-02-09 RX ORDER — ALUMINUM HYDROXIDE, MAGNESIUM HYDROXIDE, AND SIMETHICONE 1200; 120; 1200 MG/30ML; MG/30ML; MG/30ML
30 SUSPENSION ORAL 3 TIMES DAILY PRN
Status: DISCONTINUED | OUTPATIENT
Start: 2018-02-09 | End: 2018-02-17 | Stop reason: HOSPADM

## 2018-02-09 RX ORDER — DEXMEDETOMIDINE HYDROCHLORIDE 4 UG/ML
.2-1.5 INJECTION, SOLUTION INTRAVENOUS
Status: DISCONTINUED | OUTPATIENT
Start: 2018-02-09 | End: 2018-02-12

## 2018-02-09 RX ORDER — ENOXAPARIN SODIUM 100 MG/ML
40 INJECTION SUBCUTANEOUS
Status: DISCONTINUED | OUTPATIENT
Start: 2018-02-09 | End: 2018-02-17 | Stop reason: HOSPADM

## 2018-02-09 RX ORDER — DOCUSATE SODIUM 100 MG/1
100 CAPSULE, LIQUID FILLED ORAL 2 TIMES DAILY
Status: DISCONTINUED | OUTPATIENT
Start: 2018-02-09 | End: 2018-02-17 | Stop reason: HOSPADM

## 2018-02-09 RX ORDER — LEVALBUTEROL 1.25 MG/.5ML
1.25 SOLUTION, CONCENTRATE RESPIRATORY (INHALATION)
Status: DISCONTINUED | OUTPATIENT
Start: 2018-02-09 | End: 2018-02-09

## 2018-02-09 RX ORDER — HALOPERIDOL 5 MG/ML
INJECTION INTRAMUSCULAR
Status: COMPLETED
Start: 2018-02-09 | End: 2018-02-09

## 2018-02-09 RX ADMIN — HALOPERIDOL LACTATE 5 MG: 5 INJECTION, SOLUTION INTRAMUSCULAR at 14:05

## 2018-02-09 RX ADMIN — ALBUTEROL SULFATE 10 MG: 2.5 SOLUTION RESPIRATORY (INHALATION) at 13:51

## 2018-02-09 RX ADMIN — METHYLPREDNISOLONE SODIUM SUCCINATE 125 MG: 125 INJECTION, POWDER, FOR SOLUTION INTRAMUSCULAR; INTRAVENOUS at 14:05

## 2018-02-09 RX ADMIN — LEVALBUTEROL 1.25 MG: 1.25 SOLUTION, CONCENTRATE RESPIRATORY (INHALATION) at 08:10

## 2018-02-09 RX ADMIN — LEVALBUTEROL 1.25 MG: 1.25 SOLUTION, CONCENTRATE RESPIRATORY (INHALATION) at 19:43

## 2018-02-09 RX ADMIN — LEVALBUTEROL 1.25 MG: 1.25 SOLUTION, CONCENTRATE RESPIRATORY (INHALATION) at 10:31

## 2018-02-09 RX ADMIN — ENOXAPARIN SODIUM 40 MG: 40 INJECTION SUBCUTANEOUS at 15:13

## 2018-02-09 RX ADMIN — METHYLPREDNISOLONE SODIUM SUCCINATE 100 MG: 125 INJECTION, POWDER, FOR SOLUTION INTRAMUSCULAR; INTRAVENOUS at 04:27

## 2018-02-09 RX ADMIN — IPRATROPIUM BROMIDE 0.5 MG: 0.5 SOLUTION RESPIRATORY (INHALATION) at 13:06

## 2018-02-09 RX ADMIN — HALOPERIDOL LACTATE 5 MG: 5 INJECTION, SOLUTION INTRAMUSCULAR at 13:41

## 2018-02-09 RX ADMIN — SODIUM CHLORIDE 250 ML: 9 INJECTION, SOLUTION INTRAVENOUS at 23:48

## 2018-02-09 RX ADMIN — METHYLPREDNISOLONE SODIUM SUCCINATE 40 MG: 40 INJECTION, POWDER, FOR SOLUTION INTRAMUSCULAR; INTRAVENOUS at 09:45

## 2018-02-09 RX ADMIN — IPRATROPIUM BROMIDE 0.5 MG: 0.5 SOLUTION RESPIRATORY (INHALATION) at 08:10

## 2018-02-09 RX ADMIN — METHYLPREDNISOLONE SODIUM SUCCINATE 40 MG: 40 INJECTION, POWDER, FOR SOLUTION INTRAMUSCULAR; INTRAVENOUS at 22:12

## 2018-02-09 RX ADMIN — DEXMEDETOMIDINE HYDROCHLORIDE 0.5 MCG/KG/HR: 4 INJECTION, SOLUTION INTRAVENOUS at 13:42

## 2018-02-09 RX ADMIN — ACETAMINOPHEN 650 MG: 325 TABLET ORAL at 09:44

## 2018-02-09 RX ADMIN — SODIUM CHLORIDE 50 ML/HR: 9 INJECTION, SOLUTION INTRAVENOUS at 23:54

## 2018-02-09 RX ADMIN — LEVALBUTEROL 1.25 MG: 1.25 SOLUTION, CONCENTRATE RESPIRATORY (INHALATION) at 13:07

## 2018-02-09 RX ADMIN — IPRATROPIUM BROMIDE 0.5 MG: 0.5 SOLUTION RESPIRATORY (INHALATION) at 19:43

## 2018-02-09 RX ADMIN — DOCUSATE SODIUM 100 MG: 100 CAPSULE, LIQUID FILLED ORAL at 22:14

## 2018-02-09 ASSESSMENT — ACTIVITIES OF DAILY LIVING (ADL): ADEQUATE_TO_COMPLETE_ADL: YES

## 2018-02-09 NOTE — INTERDISCIPLINARY/THERAPY
02/09/18 1237   PT Last Visit   PT Received On 02/09/18   Visit Number 1   General   Chart Reviewed Yes   Therapy Treatment Diagnosis COPD with acute exacerbation   PT Treatment Duration (Min) 32 Minutes   Is this a Co-Treatment? Yes  (OT)   Additional Pertinent History PMH: asthma, arthritis, RSV, osteoporosis   Family/Caregiver Present No   Precautions   Reinforced Precautions Yes   Other Precautions Fall, very minimal activity tolerance - monitor vitals closely   Home Living   Type of Home House   Home Layout One level   Home Access Ramped entrance;Level entry   Home Adaptive Equipment Wheelchair-manual   Home Living Comments 5-6L O2 at home. Difficulty to attain PLOF due to pt's breathing difficulty.   Prior Function   Level of Branch Needs assistance with ADLs;Needs assistance with functional transfers   Lived With Daughter   Receives Help From Family  (24/7 assist)   Driving No   Prior Function Comments Pt reports she uses wc for mobility. Has assist to propel wc.    Subjective Comments   Subjective Comments RN okayed therapy. Pt in bed, requesting to go to commode, agreeable to PT/OT assisting. Pt in bed end of tx, in care of RT for breathing treatment and BIPAP application due to increased work of breathing. Notified RN of need for bedding change as linens soiled during tolieting and pt unable to tolerate further mobility.   ADL   ADL Comments Pt initially wanting to toilet on commode d/t difficulty using bed pan. This was deferred following vital sign response to activity. Assist pt with bed pan placement while sitting EOB.   Activity Tolerance   Activity Tolerance Comments Very poor tolerance to any mobility. Significant change in vitals.   Tests   Special Tests Pt's HR  bpm during tx, fluctating greatly. SpO2 87-92% on 6L O2.  RN notified throughout tx and RT was called to pt room.   Pain Assessment   Pain Assessment No/denies pain   Balance   Balance Impaired   Static Sitting Balance    Static Sitting Balance Surface Compliant   Static Sitting-Balance Support Feet unsupported;Bilateral upper extremity supported   Static Sitting-Level of Assistance Standby assistance   Static Sitting-Comment/# of Minutes Pt sat at EOB x15 minutes for attempted toileting on bed pain, SBA for balance. Pt leaning on elevated HOB for support.   Bed Mobility   Bed Mobility Assessed   Bed Mobility 1   Bed Mobility From 1 Supine   Bed Mobility Type 1 To and from   Bed Mobility to 1 Short sit   Level of Assistance 1 Standby assistance   Bed Mobility Comments 1 HOB elevated nearly 90 degrees, bed rail, significantly increased time to complete.    Transfers   Transfer Not Assessed  (Not appropriate d/t increased work of breathing)   Assessment   Rehab Potential Fair   Progress (Eval 2/9)   Problem List Decreased strength;Decreased endurance;Impaired balance;Decreased mobility   Problem List Comments Increased O2 needs   Assessment Comment Pt does not tolerate mobility. Significant increase in HR and work of breathing with transition to EOB. Not appropriate to trial commode transfer. Pt very limited by cardiopulmonary status, expect this will impact progress in PT.   Recommendation   Recommendations for Therapy Unable to tolerate 3 hours therapy;Continue skilled therapy;Safety issues - physical   Plan   Treatment Interventions Bed mobility;Functional transfer training;Balance training;Endurance training;Therapeutic activities;Therapeutic exercises   PT Frequency 3-5x/wk   Treatment Duration  32 minutes   PT - Next Appointment 02/16/18  (POC due 2/16)   PT - OK to Discharge No   Plan Comment Bed mobility, sitting balance- closely monitor vitals

## 2018-02-09 NOTE — PLAN OF CARE
Problem: Respiratory - Adult  Goal: Achieves optimal ventilation and oxygenation with noninvasive CPAP/BiLEVEL support  INTERVENTIONS:  1. Provide education to patient/family about rationale and expected outcomes associated with therapy  2. Position patient to facilitate optimal ventilation/oxygenation status and minimize respiratory effort  3. Position patient to reduce aspiration risk, elevate head of bed at least 35 degrees or higher, as applicable  4. Assess effectiveness of therapy on ventilation/oxygenation status based on oxygen saturation and/or arterial blood gases, as indicated  5. Assess patient for changes in respiratory and physiological status  6. Auscultate breath sounds and assess chest excursion, as indicated  7. Assess patient for changes in mentation and behavior  8. Routinely monitor equipment for proper performance and settings  9. Assess and monitor skin condition, in relationship to the respiratory interface  10. Assure equipment alarm volume is adequate for the patient's environment  11. Immediately respond to equipment alarm, to assess patient and/or cause for alarm  12. Follow universal infection control/hospital policy(ies)/standards   Outcome: Progressing   02/09/18 0151   Interventions Appropriate for this Patient   Achieves Optimal Oxygenation with Noninvasive CPAP/BiLEVEL Support Provide education to patient/family about rationale and expected outcomes associated with therapy;Position patient to facilitate optimal ventilation/oxygenation status and minimize respiratory effort;Position patient to reduce aspiration risk, elevate head of bed at least 35 degrees or higher, as applicable;Assess effectiveness of therapy on ventilation/oxygenation status based on oxygen saturation and/or arterial blood gases, as indicated;Assess patient for changes in respiratory and physiological status;Auscultate breath sounds and assess chest excursion, as indicated;Assess patient for changes in mentation  and behavior;Routinely monitor equipment for proper performance and settings;Assess and monitor skin condition, in relationship to the respiratory interface;Assure equipment alarm volume is adequate for the patient's environment;Immediately repsond to equipment alarm, to assess patient and/or cause for alarm;Follow universal infection control/hospital policy(ies)/standards       Problem: Neurosensory - Adult  Goal: Achieves stable or improved neurological status  INTERVENTIONS  1. Assess for and report changes in neurological status  2. Initiate measures to prevent increased intracranial pressure  3. Maintain blood pressure and fluid volume within ordered parameters to optimize cerebral perfusion and minimize risk of hemorrhage  4. Monitor temperature, glucose, and sodium. Initiate appropriate interventions as ordered   Outcome: Progressing   02/09/18 0849   Interventions Appropriate for this Patient   Achieves stable or improved neurological status Assess for and report changes in neurological status;Initiate measures to prevent increased intracranial pressure;Maintain blood pressure and fluid volume within ordered parameters to optimize cerebral perfusion and minimize risk of hemorrhage;Monitor temperature, glucose, and sodium. Initiate appropriate interventions as ordered     Goal: Absence of seizures  INTERVENTIONS  1. Monitor for seizure activity  2. Administer anti-seizure medications as ordered  3. Monitor neurological status  4. Assist patient in avoiding triggers, if identified   Outcome: Progressing   02/09/18 0849   Interventions Appropriate for this Patient   Absence of seizures Administer anti-seizure medications as ordered;Monitor for seizure activity;Monitor neurological status;Assist patient in avoiding triggers, if identified     Goal: Remains free of injury related to seizures activity  INTERVENTIONS  1. Maintain airway, patient safety  and administer oxygen as ordered  2. Monitor patient for  seizure activity, document and report duration and description of seizure to provider  3. If seizure occurs, turn patient to side and suction secretions as needed  4. Reorient patient post seizure  5. Seizure pads on all 4 side rails  6. Instruct patient/family to notify RN of any seizure activity  7. Instruct patient/family to call for assistance with activity based on assessment   Outcome: Progressing   02/09/18 0849   Interventions Appropriate for this Patient   Remains free of injury related to seizure activity Maintain airway, patient safety and administer oxygen as ordered;Monitor patient for seizure activity, document and report duration and description of seizure to provider;If seizure occurs, turn patient to side and suction secretions as needed;Instruct patient/family to notify RN of any seizure activity;Instruct patient/family to call for assistance with activity based on assessment;Seizure pads on all 4 side rails;Reorient patient post seizure       Problem: Gastrointestinal - Adult  Goal: Establish and maintain optimal ostomy function  INTERVENTIONS:  1. Assess bowel function  2. Encourage mobilization and activity  3. Assess ostomy stoma characteristics  4. Assess skin surrounding ostomy stoma  5. Empty/Change ostomy pouch as needed  6. Provide ostomy care  7. Consult Wound Care/Ostomy Nurses as indicated  8. Nutrition consult as indicated   Outcome: Progressing   02/09/18 0849   Interventions Appropriate for this Patient   Establish and maintain optimal ostomy function Assess bowel function;Encourage mobilization and activity;Assess ostomy stoma characteristics;Empty/Change ostomy pouch as needed;Assess skin surrounding ostomy stoma;Consult Wound Care/Ostomy Nurse as indicated;Provide ostomy care;Nutrition consult as indicated       Problem: Genitourinary - Adult  Goal: Urinary catheter remains patent  INTERVENTIONS:  1. Assess patency of urinary catheter.  2. Irrigate catheter per order if indicated  and notify provider if unable to irrigate.  3. Assess need for a larger catheter size or a 3-way catheter for continuous bladder irrigation.   Outcome: Progressing   02/09/18 0849   Interventions Appropriate for this Patient   Urinary catheter remains patent Assess patency of urinary catheter;Irrigate catheter per order if indicated and notify provider if unable to irrigate;Assess need for a larger catheter size or a 3-way catheter for continuous bladder irrigation       Problem: Skin/Tissue Integrity - Adult  Goal: Incisions, wounds, or drain sites healing without S/S of infection  INTERVENTIONS  1. Assess and document risk factors for skin breakdown  2. Assess and document skin integrity  3. Assess and document dressing/incision, wound bed, drain sites and surrounding tissue  4. Implement wound care per orders   Outcome: Progressing   02/09/18 0849   Interventions Appropriate for this Patient   Incision(s), wound(s) or drain site(s) healing without S/S of infection Assess and document risk factors for skin breakdown;Assess and document skin integrity;Assess and document dressing/incision, wound bed, drain sites and surrounding tissue;Implement wound care per orders

## 2018-02-09 NOTE — PROGRESS NOTES
"ICU DAILY PROGRESS NOTE      Denita Spring is an 57 y.o. female with acute on chronic hypoxemic respiratory failure secondary to RSV infection.  Patient has a history of asthma and is on baseline oxygen 4 L/min.    SUBJECTIVE  Patient is complaining of \"chest congestion\"  She was on BiPAP this morning  Hemodynamically stable  Sinus tachycardia  Denies any chest heaviness or chest pain        Current Facility-Administered Medications:   •  acetaminophen (TYLENOL) tablet 325-650 mg, 325-650 mg, oral, q4h PRN, Jose Koenig MD, 650 mg at 02/09/18 0944  •  alum-mag hydroxide-simeth (MAALOX ADVANCED) suspension 30 mL, 30 mL, oral, 3x daily PRN, Jose Koenig MD  •  bisacodyl (DULCOLAX) suppository 10 mg, 10 mg, rectal, Daily PRN, Jose Koenig MD  •  docusate sodium (COLACE) capsule 100 mg, 100 mg, oral, 2x daily, Jose Koenig MD  •  enoxaparin (LOVENOX) syringe 40 mg, 40 mg, subcutaneous, Daily at 1400, Jose Koenig MD  •  ipratropium (ATROVENT) 0.02 % nebulizer solution 0.5 mg, 0.5 mg, nebulization, 3x daily, Alexsander Park DO, 0.5 mg at 02/09/18 0810  •  levalbuterol (XOPENEX) 1.25 mg/0.5 mL nebulizer solution 1.25 mg, 1.25 mg, nebulization, q2h PRN, Raymond Blackmon MD  •  magnesium hydroxide (MILK OF MAGNESIA) 400 mg/5 mL oral suspension 30 mL, 30 mL, oral, Daily PRN, Jose Koenig MD  •  magnesium sulfate 2 g in SWFI 50 mL IVPB (premix), 2 g, intravenous, Once PRN, Alexsander Park DO  •  methylPREDNISolone sod suc(PF) (Solu-MEDROL) injection 40 mg, 40 mg, intravenous, q12h TASHA, Raymond Blackmon MD, 40 mg at 02/09/18 0945  •  ondansetron (ZOFRAN) tablet 4 mg, 4 mg, oral, q6h PRN **OR** ondansetron (ZOFRAN) injection 4 mg, 4 mg, intravenous, q6h PRN, Jose Koenig MD  •  polyvinyl alcohol (ARTIFICIAL TEARS) 1.4 % ophthalmic solution 1-2 drop, 1-2 drop, Both Eyes, PRN, Alexsander Park, DO  •  Insert peripheral IV, , , Once **AND** Maintain IV access, , , Until discontinued **AND** Saline lock IV, , , Once " "**AND** sodium chloride flush 3 mL, 3 mL, intravenous, PRN, Jose Koenig MD  •  traZODone (DESYREL) tablet 25 mg, 25 mg, oral, Nightly PRN, Jose Koenig MD  •  white petrolatum-mineral oil (LACRILUBE) ophthalmic ointment 1 application, 1 application, Both Eyes, 2x daily, Alexsander Park DO  •  white petrolatum-mineral oil (LACRILUBE) ophthalmic ointment 1 application, 1 application, Both Eyes, PRN, Alexsander Park DO      Allergies   Allergen Reactions   • Diphenhydramine      DENIES   • Cefuroxime      SUNBURN   • Cefuroxime Axetil    • Diphenhydramine Hcl    • Erythromycin Lactobionate    • Methylphenidate      ON FIRE   • Metoclopramide      dizzy, sweating   • Propoxyphene    • Propoxyphene N-Acetaminophen    • Tramadol      PARALYSIS   • Codeine      oxygen gets really low   • Erythromycin Base      GI UPSET   • Ibuprofen      DENIES   • Latex      DRY MOUTH           Objective       /80 (BP Location: Left arm, Patient Position: Head of bed 30 degrees or higher)   Pulse 122   Temp 36.6 °C (97.9 °F) (Axillary)   Resp (!) 25   Ht 1.6 m (5' 3\")   Wt 79.3 kg (174 lb 13.2 oz)   SpO2 93%   BMI 30.97 kg/m²       Physical Exam     HEENT: Sclera nonicteric  CVS: Tachycardiac  Lungs: Scattered rhonchi and occasional wheeze diffusely bilaterally  Abdomen: Soft, nontender, no rigidity or guarding  Extremities: No edema in the lower extremities bilaterally, capillary refill less than 2 seconds.      I/O last 3 completed shifts:  In: 500 [I.V.:500]  Out: 425 [Urine:425]    Lab Results   Component Value Date    BLOODCX No growth at 120 hours 12/02/2017       Lab Results   Component Value Date     02/09/2018    K 3.9 02/09/2018     02/09/2018    CO2 29 02/09/2018    BUN 21 02/09/2018    CREATININE 0.6 02/09/2018    GLUCOSE 149 (H) 02/09/2018    CALCIUM 8.8 02/09/2018    PROT 5.9 (L) 02/09/2018    ALBUMIN 3.5 02/09/2018    AST 11 02/09/2018    ALT 22 02/09/2018    ALKPHOS 63 02/09/2018    BILITOT 0.32 " 02/09/2018       Lab Results   Component Value Date    URINECX PROTEUS MIRABILIS 01/23/2017           ASSESSMENT and PLAN    Active Problems:    COPD exacerbation (CMS/HCC)    COPD with acute exacerbation (CMS/[facility])    This is a 57-year-old female with acute on chronic respiratory failure currently on BiPAP.    Pulmonary: Acute on chronic hypoxemic respiratory failure currently on BiPAP.  Patient with history of asthma on bronchodilators, steroids.  Neuro: Patient is anxious obviously secondary to respiratory failure.  Monitor for delirium and use Haldol as needed.  CVS: Sinus tachycardia secondary to #1.  Surface echocardiogram in 2017 September with EF of 64%, no valvular abnormalities.  ID: Consider antibiotics based on clinical course but at this time will do supportive care for her RSV infection.  GI: On regular diet, advance as tolerated.  Endocrine: Monitor glucose while on steroids.  Goal blood glucose less than 180 mg/dL.  Renal/electrolytes: Making urine, no acute issues with electrolytes.  Prophylaxis: Lovenox for DVT prophylaxis.  CODE STATUS: Full code.    Total critical care time spent 65 minutes.  Discussed with the patient, multidisciplinary team during the rounds this morning.          YUVAL STEVENSON MD  10:26 AM, 2/9/2018.
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
## 2018-02-09 NOTE — INTERDISCIPLINARY/THERAPY
02/09/18 1236   OT Last Visit   OT Received On 02/09/18   Precautions   Other Precautions Monitor O2/HR   Home Living   Type of Home House   Home Layout Two level   Home Living Comments Pt reports living with dtr and mom. Utilizes manual w/c for mobility with assistance to propel. Limited PLOF obtained d/t breathing difficulty.    Prior Function   Level of [place] Needs assistance with ADLs;Needs assistance with homemaking;Other (Comment)  (Assistance for w/c propulsion)   Lived With Family   ADL Assistance Needs assistance   IADL Assistance Needs assistance   Prior Function Comments Pt reports requiring A for ADLs and IADLs.    Subjective Comments   Subjective Comments RN ok'd tx. Pt agreeable reporting she needed to use the [facility].    General   Chart Reviewed Yes   Therapy Treatment Diagnosis COPD with acute exacerbation   OT Treatment Duration (Min) 32 Minutes   Is this a Co-Treatment? Yes  (PT)   Family/Caregiver Present No   Pain Assessment   Pain Assessment No/denies pain   Cognition   Overall Cognitive Status Unable to assess  (Appropriate communication, limited by breathing difficulty)   Vision - Complex Assessment   Vision Comments Functional vision.    LE Dressing   LE Dressing (0/2 socks)   Toileting   Toileting Level of Assistance Maximum assistance   Where Assessed (Edge of bed -bed pan. )   Toileting Comments Pt requesting to use bedside commode-RN ok'd if completing slowly with multiple rest breaks d/t breathing. Pt sitting EOB in prep for transfer, d/t heart rate (113-179) , did not progress tx to transfer to [facility]. Pt agreeable to utilizing bed pan EOB, pt requiring max A for bed pan positioning and hygiene. Pt requiring new bed sheets post toileting-RN notified. Unable to complete d/t SOB and HR pt requiring extensive time to recover between transitional movements.    Bed Mobility   Bed Mobility Assessed   Bed Mobility 1   Bed Mobility From 1 Supine  (HOB elevated)   Bed Mobility Type 1 To and  from   Bed Mobility to 1 Short sit   Level of Assistance 1 Standby assistance  (extra time, HOB elevated, using bed rail)   Bed Mobility Comments 1 Pt ended session in partial long sitting in care of respiratory therapist, all needs met w/in reach   Transfers   Transfer Not Assessed   Activity Tolerance   Activity Tolerance Comments Pt's SaO2 87-92% 6LPM . Respiratory therapist present during tx session. Pt's -179 throughout tx session. RN notified of tx outcome.    Assessment   Rehab Potential Poor   Problem List Decreased ADL status;Decreased upper extremity strength;Decreased endurance;Decreased functional mobility;Decreased IADLs   Recommendations for Therapy Continue skilled therapy;Unable to tolerate 3 hours therapy;Safety issues - physical  (O2 needs/HR)   Assessment Comment Pt demos poor activity tolerance and cardiopulmonary capacity. Pt demo'd need for a significant amount of time to transition between supine/sit and unable to progress to SPT d/t HR and respiratory status. Continue skilled OT tx per early mobility protocol.    Plan   Plan Comment EOB, progress as able.    Treatment Interventions ADL retraining   OT Frequency 3-5x/wk   OT - Next Appointment 02/16/18  ([facility] [place])

## 2018-02-09 NOTE — PROGRESS NOTES
Called by the bedside RN Javi to evaluate the patient for worsening respiratory distress and hypoxia after physical therapy.    Patient was on BiPAP in acute respiratory distress. She was initiated on NIPPV and continuous nebulization.     I will plan on intubation if she doesn't improve or deteriorates while on NIPPV( Non-Invasive Positive pressure ventilation).    She is also started on Precedex infusion to better tolerate NIPPV.    TCCT spent 45 min at the bedside

## 2018-02-09 NOTE — PLAN OF CARE
Problem: Respiratory - Adult  Goal: Achieves optimal ventilation and oxygenation with noninvasive CPAP/BiLEVEL support  INTERVENTIONS:  1. Provide education to patient/family about rationale and expected outcomes associated with therapy  2. Position patient to facilitate optimal ventilation/oxygenation status and minimize respiratory effort  3. Position patient to reduce aspiration risk, elevate head of bed at least 35 degrees or higher, as applicable  4. Assess effectiveness of therapy on ventilation/oxygenation status based on oxygen saturation and/or arterial blood gases, as indicated  5. Assess patient for changes in respiratory and physiological status  6. Auscultate breath sounds and assess chest excursion, as indicated  7. Assess patient for changes in mentation and behavior  8. Routinely monitor equipment for proper performance and settings  9. Assess and monitor skin condition, in relationship to the respiratory interface  10. Assure equipment alarm volume is adequate for the patient's environment  11. Immediately respond to equipment alarm, to assess patient and/or cause for alarm  12. Follow universal infection control/hospital policy(ies)/standards  Outcome: Progressing   02/09/18 0151   Interventions Appropriate for this Patient   Achieves Optimal Oxygenation with Noninvasive CPAP/BiLEVEL Support Provide education to patient/family about rationale and expected outcomes associated with therapy;Position patient to facilitate optimal ventilation/oxygenation status and minimize respiratory effort;Position patient to reduce aspiration risk, elevate head of bed at least 35 degrees or higher, as applicable;Assess effectiveness of therapy on ventilation/oxygenation status based on oxygen saturation and/or arterial blood gases, as indicated;Assess patient for changes in respiratory and physiological status;Auscultate breath sounds and assess chest excursion, as indicated;Assess patient for changes in mentation  and behavior;Routinely monitor equipment for proper performance and settings;Assess and monitor skin condition, in relationship to the respiratory interface;Assure equipment alarm volume is adequate for the patient's environment;Immediately repsond to equipment alarm, to assess patient and/or cause for alarm;Follow universal infection control/hospital policy(ies)/standards

## 2018-02-09 NOTE — INTERDISCIPLINARY/THERAPY
[id number]  .  Patient admitted with respiratory failure r/t RSV. Patient currently on 6LO2, IV steriods/nebs.    CM met with patient, introduced self, and explained CM role. The patient states that she lives with her daughter in between [place] and [place] in a private residence. The patient tells me that she normally [facility] 5-[facility]L [facility]. She states she uses a w/c at baseline but was independent with transfers and able to ambulate short distances. Family helped patient's with ADLs. The patient receives medical care at [facility]. CM anticipates discharge home when medically stable.

## 2018-02-10 ENCOUNTER — APPOINTMENT (OUTPATIENT)
Dept: RADIOLOGY | Facility: HOSPITAL | Age: 58
DRG: 189 | End: 2018-02-10
Attending: INTERNAL MEDICINE
Payer: COMMERCIAL

## 2018-02-10 LAB
ANION GAP SERPL CALC-SCNC: 8 MMOL/L (ref 3–11)
ANISOCYTOSIS PRESENCE IN BLOOD, ANALYZER: ABNORMAL
BASE EXCESS BLDA CALC-SCNC: 5.6 MMOL/L (ref -2–2)
BASOPHILS # BLD AUTO: 0 10*3/UL
BASOPHILS NFR BLD AUTO: 0 % (ref 0–2)
BUN SERPL-MCNC: 30 MG/DL (ref 7–25)
CALCIUM SERPL-MCNC: 9 MG/DL (ref 8.6–10.3)
CHLORIDE SERPL-SCNC: 105 MMOL/L (ref 98–107)
CO2 BLDA-SCNC: 35 MMOL/L (ref 19–24)
CO2 SERPL-SCNC: 32 MMOL/L (ref 21–32)
CREAT SERPL-MCNC: 0.7 MG/DL (ref 0.6–1.2)
DEVICE (RT): ABNORMAL
EOSINOPHIL # BLD AUTO: 0 10*3/UL
EOSINOPHIL NFR BLD AUTO: 0 % (ref 0–3)
ERYTHROCYTE [DISTWIDTH] IN BLOOD BY AUTOMATED COUNT: 16.8 % (ref 11.5–14)
ERYTHROCYTE [DISTWIDTH] IN BLOOD BY AUTOMATED COUNT: 17.5 % (ref 11.5–14)
ERYTHROCYTE [DISTWIDTH] IN BLOOD BY AUTOMATED COUNT: 17.6 % (ref 11.5–14)
ERYTHROCYTE [DISTWIDTH] IN BLOOD BY AUTOMATED COUNT: 17.9 % (ref 11.5–14)
FIO2: 40 %
GFR SERPL CREATININE-BSD FRML MDRD: 87 ML/MIN/1.73M*2
GLUCOSE BLDC GLUCOMTR-MCNC: 132 MG/DL (ref 70–105)
GLUCOSE SERPL-MCNC: 143 MG/DL (ref 70–105)
HCO3 BLDA-SCNC: 33 MMOL/L (ref 23–29)
HCT VFR BLD AUTO: 34.2 % (ref 34–45)
HCT VFR BLD AUTO: 36.4 % (ref 34–45)
HCT VFR BLD AUTO: 38.9 % (ref 34–45)
HCT VFR BLD AUTO: 39.4 % (ref 34–45)
HGB BLD-MCNC: 11.2 G/DL (ref 11.5–15.5)
HGB BLD-MCNC: 11.9 G/DL (ref 11.5–15.5)
HGB BLD-MCNC: 12.5 G/DL (ref 11.5–15.5)
HGB BLD-MCNC: 12.7 G/DL (ref 11.5–15.5)
HYPOCHROMIA PRESENCE IN BLOOD, ANALYZER: ABNORMAL
HYPOCHROMIA PRESENCE IN BLOOD, ANALYZER: ABNORMAL
LACTATE BLDV-SCNC: 0.77 MMOL/L (ref 0.5–1.99)
LYMPHOCYTES # BLD AUTO: 0.7 10*3/UL
LYMPHOCYTES # BLD AUTO: 1.2 10*3/UL
LYMPHOCYTES # BLD AUTO: 1.4 10*3/UL
LYMPHOCYTES # BLD AUTO: 1.7 10*3/UL
LYMPHOCYTES NFR BLD AUTO: 17 % (ref 11–47)
LYMPHOCYTES NFR BLD AUTO: 6 % (ref 11–47)
LYMPHOCYTES NFR BLD AUTO: 7 % (ref 11–47)
LYMPHOCYTES NFR BLD AUTO: 8 % (ref 11–47)
MAGNESIUM SERPL-MCNC: 2.3 MG/DL (ref 1.8–2.4)
MCH RBC QN AUTO: 26.7 PG (ref 28–33)
MCH RBC QN AUTO: 26.8 PG (ref 28–33)
MCH RBC QN AUTO: 27.1 PG (ref 28–33)
MCH RBC QN AUTO: 27.2 PG (ref 28–33)
MCHC RBC AUTO-ENTMCNC: 32.2 G/DL (ref 32–36)
MCHC RBC AUTO-ENTMCNC: 32.2 G/DL (ref 32–36)
MCHC RBC AUTO-ENTMCNC: 32.7 G/DL (ref 32–36)
MCHC RBC AUTO-ENTMCNC: 32.8 G/DL (ref 32–36)
MCV RBC AUTO: 82.9 FL (ref 81–97)
MCV RBC AUTO: 83 FL (ref 81–97)
MCV RBC AUTO: 83 FL (ref 81–97)
MCV RBC AUTO: 83.4 FL (ref 81–97)
MODE (RT): ABNORMAL
MONOCYTES # BLD AUTO: 0.7 10*3/UL
MONOCYTES # BLD AUTO: 0.8 10*3/UL
MONOCYTES # BLD AUTO: 0.9 10*3/UL
MONOCYTES # BLD AUTO: 1.5 10*3/UL
MONOCYTES NFR BLD AUTO: 5 % (ref 3–11)
MONOCYTES NFR BLD AUTO: 7 % (ref 3–11)
MONOCYTES NFR BLD AUTO: 7 % (ref 3–11)
MONOCYTES NFR BLD AUTO: 8 % (ref 3–11)
MV (RT): 13.1 L/MIN
NEUTROPHILS # BLD AUTO: 10.2 10*3/UL
NEUTROPHILS # BLD AUTO: 14.4 10*3/UL
NEUTROPHILS # BLD AUTO: 15.2 10*3/UL
NEUTROPHILS # BLD AUTO: 7.8 10*3/UL
NEUTROPHILS NFR BLD AUTO: 76 % (ref 41–81)
NEUTROPHILS NFR BLD AUTO: 83 % (ref 41–81)
NEUTROPHILS NFR BLD AUTO: 87 % (ref 41–81)
NEUTROPHILS NFR BLD AUTO: 88 % (ref 41–81)
PCO2 BLDA: 63 MMHG (ref 35–45)
PEEP SET (RT): 5 CM/H2O
PH BLDA: 7.34 [PH] (ref 7.35–7.45)
PHOSPHATE SERPL-MCNC: 4.2 MG/DL (ref 2.5–4.9)
PLATELET # BLD AUTO: 227 10*3/UL (ref 140–350)
PLATELET # BLD AUTO: 248 10*3/UL (ref 140–350)
PLATELET # BLD AUTO: 275 10*3/UL (ref 140–350)
PLATELET # BLD AUTO: 291 10*3/UL (ref 140–350)
PMV BLD AUTO: 6.3 FL (ref 6.9–10.8)
PMV BLD AUTO: 6.3 FL (ref 6.9–10.8)
PMV BLD AUTO: 6.4 FL (ref 6.9–10.8)
PMV BLD AUTO: 6.6 FL (ref 6.9–10.8)
PO2 BLDA: 84.1 MMHG (ref 60–80)
POCT CTO2, ARTERIAL: 15.9 ML/DL (ref 15–24)
POCT HEMOGLOBIN (MEASURED), ARTERIAL: 11.8 G/DL (ref 11.5–15.5)
POCT OXYHEMOGLOBIN, ARTERIAL: 95 % (ref 94–97)
POTASSIUM SERPL-SCNC: 4.1 MMOL/L (ref 3.5–5.1)
PS (RT): 8 CM/H2O
RBC # BLD AUTO: 4.11 10*6/ΜL (ref 3.7–5.3)
RBC # BLD AUTO: 4.39 10*6/ΜL (ref 3.7–5.3)
RBC # BLD AUTO: 4.69 10*6/ΜL (ref 3.7–5.3)
RBC # BLD AUTO: 4.73 10*6/ΜL (ref 3.7–5.3)
RR TOTAL (RT): 32 B/MIN
SODIUM SERPL-SCNC: 145 MMOL/L (ref 135–145)
VT OBSERVED (RT): 420 ML
WBC # BLD AUTO: 10.3 10*3/UL (ref 4.5–10.5)
WBC # BLD AUTO: 11.8 10*3/UL (ref 4.5–10.5)
WBC # BLD AUTO: 17.4 10*3/UL (ref 4.5–10.5)
WBC # BLD AUTO: 17.4 10*3/UL (ref 4.5–10.5)

## 2018-02-10 PROCEDURE — 99291 CRITICAL CARE FIRST HOUR: CPT | Performed by: INTERNAL MEDICINE

## 2018-02-10 PROCEDURE — 6370000100 HC RX 637 (ALT 250 FOR IP): Performed by: INTERNAL MEDICINE

## 2018-02-10 PROCEDURE — 84100 ASSAY OF PHOSPHORUS: CPT | Performed by: INTERNAL MEDICINE

## 2018-02-10 PROCEDURE — 83735 ASSAY OF MAGNESIUM: CPT | Performed by: INTERNAL MEDICINE

## 2018-02-10 PROCEDURE — 82805 BLOOD GASES W/O2 SATURATION: CPT | Mod: QW

## 2018-02-10 PROCEDURE — 80048 BASIC METABOLIC PNL TOTAL CA: CPT | Performed by: INTERNAL MEDICINE

## 2018-02-10 PROCEDURE — 2590000100 HC RX 259: Performed by: INTERNAL MEDICINE

## 2018-02-10 PROCEDURE — 6360000200 HC RX 636 W HCPCS (ALT 250 FOR IP): Performed by: INTERNAL MEDICINE

## 2018-02-10 PROCEDURE — 83605 ASSAY OF LACTIC ACID: CPT

## 2018-02-10 PROCEDURE — (BLANK) HC ROOM ICU INTERMEDIATE TRANSITIONAL CARE

## 2018-02-10 PROCEDURE — 36415 COLL VENOUS BLD VENIPUNCTURE: CPT | Performed by: INTERNAL MEDICINE

## 2018-02-10 PROCEDURE — 71045 X-RAY EXAM CHEST 1 VIEW: CPT

## 2018-02-10 PROCEDURE — 85025 COMPLETE CBC W/AUTO DIFF WBC: CPT | Performed by: INTERNAL MEDICINE

## 2018-02-10 PROCEDURE — 82962 GLUCOSE BLOOD TEST: CPT

## 2018-02-10 PROCEDURE — 94003 VENT MGMT INPAT SUBQ DAY: CPT

## 2018-02-10 PROCEDURE — 2580000300 HC RX 258: Performed by: INTERNAL MEDICINE

## 2018-02-10 PROCEDURE — 94640 AIRWAY INHALATION TREATMENT: CPT

## 2018-02-10 RX ORDER — LEVALBUTEROL 1.25 MG/.5ML
1.25 SOLUTION, CONCENTRATE RESPIRATORY (INHALATION)
Status: DISCONTINUED | OUTPATIENT
Start: 2018-02-10 | End: 2018-02-17 | Stop reason: HOSPADM

## 2018-02-10 RX ORDER — ESOMEPRAZOLE MAGNESIUM 40 MG/1
40 CAPSULE, DELAYED RELEASE ORAL
Status: DISCONTINUED | OUTPATIENT
Start: 2018-02-10 | End: 2018-02-17 | Stop reason: HOSPADM

## 2018-02-10 RX ORDER — ROFLUMILAST 500 UG/1
500 TABLET ORAL
Status: DISCONTINUED | OUTPATIENT
Start: 2018-02-10 | End: 2018-02-17 | Stop reason: HOSPADM

## 2018-02-10 RX ORDER — ALPRAZOLAM 0.25 MG/1
0.25 TABLET ORAL 3 TIMES DAILY
Status: DISCONTINUED | OUTPATIENT
Start: 2018-02-10 | End: 2018-02-17 | Stop reason: HOSPADM

## 2018-02-10 RX ORDER — THEOPHYLLINE 200 MG/1
200 TABLET, EXTENDED RELEASE ORAL EVERY 12 HOURS SCHEDULED
Status: DISCONTINUED | OUTPATIENT
Start: 2018-02-10 | End: 2018-02-10

## 2018-02-10 RX ADMIN — ALPRAZOLAM 0.25 MG: 0.25 TABLET ORAL at 20:59

## 2018-02-10 RX ADMIN — DEXMEDETOMIDINE HYDROCHLORIDE 0.3 MCG/KG/HR: 4 INJECTION, SOLUTION INTRAVENOUS at 05:31

## 2018-02-10 RX ADMIN — DOCUSATE SODIUM 100 MG: 100 CAPSULE, LIQUID FILLED ORAL at 10:30

## 2018-02-10 RX ADMIN — THEOPHYLLINE ANHYDROUS 400 MG: 200 CAPSULE, EXTENDED RELEASE ORAL at 16:46

## 2018-02-10 RX ADMIN — DOCUSATE SODIUM 100 MG: 100 CAPSULE, LIQUID FILLED ORAL at 20:59

## 2018-02-10 RX ADMIN — LEVALBUTEROL 1.25 MG: 1.25 SOLUTION, CONCENTRATE RESPIRATORY (INHALATION) at 13:42

## 2018-02-10 RX ADMIN — LEVALBUTEROL 1.25 MG: 1.25 SOLUTION, CONCENTRATE RESPIRATORY (INHALATION) at 19:34

## 2018-02-10 RX ADMIN — LEVALBUTEROL 1.25 MG: 1.25 SOLUTION, CONCENTRATE RESPIRATORY (INHALATION) at 08:22

## 2018-02-10 RX ADMIN — ENOXAPARIN SODIUM 40 MG: 40 INJECTION SUBCUTANEOUS at 15:00

## 2018-02-10 RX ADMIN — METHYLPREDNISOLONE SODIUM SUCCINATE 40 MG: 40 INJECTION, POWDER, FOR SOLUTION INTRAMUSCULAR; INTRAVENOUS at 20:58

## 2018-02-10 RX ADMIN — IPRATROPIUM BROMIDE 0.5 MG: 0.5 SOLUTION RESPIRATORY (INHALATION) at 19:34

## 2018-02-10 RX ADMIN — LEVALBUTEROL 1.25 MG: 1.25 SOLUTION, CONCENTRATE RESPIRATORY (INHALATION) at 22:58

## 2018-02-10 RX ADMIN — ALPRAZOLAM 0.25 MG: 0.25 TABLET ORAL at 15:00

## 2018-02-10 RX ADMIN — METHYLPREDNISOLONE SODIUM SUCCINATE 40 MG: 40 INJECTION, POWDER, FOR SOLUTION INTRAMUSCULAR; INTRAVENOUS at 09:12

## 2018-02-10 RX ADMIN — IPRATROPIUM BROMIDE 0.5 MG: 0.5 SOLUTION RESPIRATORY (INHALATION) at 13:42

## 2018-02-10 RX ADMIN — IPRATROPIUM BROMIDE 0.5 MG: 0.5 SOLUTION RESPIRATORY (INHALATION) at 22:58

## 2018-02-10 RX ADMIN — IPRATROPIUM BROMIDE 0.5 MG: 0.5 SOLUTION RESPIRATORY (INHALATION) at 08:22

## 2018-02-10 RX ADMIN — ACETAMINOPHEN 650 MG: 325 TABLET ORAL at 16:03

## 2018-02-10 RX ADMIN — ROFLUMILAST 500 MCG: 500 TABLET ORAL at 14:20

## 2018-02-10 RX ADMIN — ESOMEPRAZOLE MAGNESIUM 40 MG: 40 CAPSULE, DELAYED RELEASE ORAL at 15:00

## 2018-02-10 RX ADMIN — LEVALBUTEROL 1.25 MG: 1.25 SOLUTION, CONCENTRATE RESPIRATORY (INHALATION) at 10:38

## 2018-02-10 RX ADMIN — LEVALBUTEROL 1.25 MG: 1.25 SOLUTION, CONCENTRATE RESPIRATORY (INHALATION) at 17:17

## 2018-02-10 NOTE — PROGRESS NOTES
CRITICAL CARE PROGRESS NOTE    Chief complaint:   Acute on chronic hypoxemic respiratory failure secondary to RSV infection.    Events of last night noted, patient seen and examined.  Currently on noninvasive positive pressure ventilation.  Multiple episodes yesterday reportedly with SPO2 decreasing with any type of movement.            Current Facility-Administered Medications:   •  acetaminophen (TYLENOL) tablet 325-650 mg, 325-650 mg, oral, q4h PRN, Jose Koenig MD, 650 mg at 02/09/18 0944  •  ALPRAZolam (XANAX) tablet 0.25 mg, 0.25 mg, oral, 3x daily, Jose Short MD  •  alum-mag hydroxide-simeth (MAALOX ADVANCED) suspension 30 mL, 30 mL, oral, 3x daily PRN, Jose Koenig MD  •  bisacodyl (DULCOLAX) suppository 10 mg, 10 mg, rectal, Daily PRN, Jose Koenig MD  •  dexmedetomidine (PRECEDEX) 400 mcg in  mL infusion (premix), 0.2-1.5 mcg/kg/hr, intravenous, Titrated, Raymond Blackmon MD, Stopped at 02/10/18 0645  •  docusate sodium (COLACE) capsule 100 mg, 100 mg, oral, 2x daily, Jose Koenig MD, 100 mg at 02/09/18 2214  •  enoxaparin (LOVENOX) syringe 40 mg, 40 mg, subcutaneous, Daily at 1400, Jose Koenig MD, 40 mg at 02/09/18 1513  •  esomeprazole (NexIUM) capsule 40 mg, 40 mg, oral, Daily at 1600, Jose Short MD  •  haloperidol lactate (HALDOL) injection 5 mg, 5 mg, intravenous, q6h PRN, Raymond Blackmon MD, 5 mg at 02/09/18 1405  •  ipratropium (ATROVENT) 0.02 % nebulizer solution 0.5 mg, 0.5 mg, nebulization, 3x daily, Alexsander Park DO, 0.5 mg at 02/10/18 0822  •  levalbuterol (XOPENEX) 1.25 mg/0.5 mL nebulizer solution 1.25 mg, 1.25 mg, nebulization, q2h PRN, Raymond Blackmon MD, 1.25 mg at 02/10/18 1038  •  magnesium hydroxide (MILK OF MAGNESIA) 400 mg/5 mL oral suspension 30 mL, 30 mL, oral, Daily PRN, Jose Koenig MD  •  magnesium sulfate 2 g in SWFI 50 mL IVPB (premix), 2 g, intravenous, Once PRN, Alexsander Park DO  •  methylPREDNISolone sod suc(PF) (Solu-MEDROL) injection 40  mg, 40 mg, intravenous, q12h TASHA, Raymond Blackmon MD, 40 mg at 02/10/18 0912  •  NS infusion, 50 mL/hr, intravenous, Continuous, Niranjan Abad CNP, Last Rate: 50 mL/hr at 02/09/18 2354, 50 mL/hr at 02/09/18 2354  •  ondansetron (ZOFRAN) tablet 4 mg, 4 mg, oral, q6h PRN **OR** ondansetron (ZOFRAN) injection 4 mg, 4 mg, intravenous, q6h PRN, Jose Koenig MD  •  polyvinyl alcohol (ARTIFICIAL TEARS) 1.4 % ophthalmic solution 1-2 drop, 1-2 drop, Both Eyes, PRN, Alexsander Park DO  •  roflumilast (DALIRESP) tablet 500 mcg, 500 mcg, oral, Daily with lunch, Jose Short MD  •  Insert peripheral IV, , , Once **AND** Maintain IV access, , , Until discontinued **AND** Saline lock IV, , , Once **AND** sodium chloride flush 3 mL, 3 mL, intravenous, PRN, Jose Koenig MD  •  sodium chloride-aloe vera (AYR) nasal gel 1 application, 1 application, Each Nostril, PRN, Alexsander Park DO  •  theophylline (JAZMYNE-24) 24 hr capsule 400 mg, 400 mg, oral, Daily, Jose Short MD  •  traZODone (DESYREL) tablet 25 mg, 25 mg, oral, Nightly PRN, Jose Koenig MD  •  white petrolatum-mineral oil (LACRILUBE) ophthalmic ointment 1 application, 1 application, Both Eyes, 2x daily, Alexsander Park DO  •  white petrolatum-mineral oil (LACRILUBE) ophthalmic ointment 1 application, 1 application, Both Eyes, PRN, Alexsander Park DO     Objective       VITAL SIGNS:  Temp:  [36.2 °C (97.2 °F)-36.8 °C (98.2 °F)] 36.5 °C (97.7 °F)  Heart Rate:  [] 129  Resp:  [19-39] 34  BP: ()/() 144/97  FiO2 (%):  [40 %-100 %] 40 %      Intake/Output last 3 shifts:  I/O last 3 completed shifts:  In: 663 [I.V.:663]  Out: 425 [Urine:425]  Intake/Output this shift:  No intake/output data recorded.       PHYSICAL EXAM:   Physical Exam   HENT:   Head: Normocephalic.   Eyes: Pupils are equal, round, and reactive to light.   Neck: No JVD present. Carotid bruit is not present. No thyroid mass and no thyromegaly present.   Cardiovascular: Regular  rhythm and S1 normal.    Murmur heard.   Systolic murmur is present   Pulmonary/Chest: Accessory muscle usage present. Tachypnea noted. She has decreased breath sounds in the right lower field and the left lower field.   Abdominal: Normal appearance and bowel sounds are normal. There is no hepatosplenomegaly.   Nursing note and vitals reviewed.          RESULTS AND DECISION MAKING:          Results from last 7 days  Lab Units 02/10/18  1023 02/10/18  0407 02/09/18  2229   WBC AUTO 10*3/uL 17.4* 11.8* 13.3*   HEMOGLOBIN g/dL 12.7 11.9 12.3   HEMATOCRIT % 39.4 36.4 37.9   PLATELETS AUTO 10*3/uL 275 248 263       Results from last 7 days  Lab Units 02/10/18  0407 02/09/18  0341 02/08/18  1547   SODIUM mmol/L 145 142 141   POTASSIUM mmol/L 4.1 3.9 4.3   CHLORIDE mmol/L 105 102 103   CO2 mmol/L 32 29 30   BUN mg/dL 30* 21 17   CREATININE mg/dL 0.7 0.6 0.7   CALCIUM mg/dL 9.0 8.8 9.5   TOTAL PROTEIN g/dL  --  5.9* 6.8   BILIRUBIN TOTAL mg/dL  --  0.32 0.34   ALK PHOS U/L  --  63 69   ALT U/L  --  22 26   AST U/L  --  11 15   GLUCOSE mg/dL 143* 149* 144*       Results from last 7 days  Lab Units 02/10/18  0617 02/09/18  1507 02/08/18  1821   POCT PH, ARTERIAL  7.34* 7.40 7.40   POCT PCO2, ARTERIAL mmHg 63.0* 53.3* 46.4*   POCT PO2, ARTERIAL mmHg 84.1* 353.7* 91.6*   POCT HCO3, ARTERIAL mmol/L 33.0* 32.0* 28.0   BEART mmol/L 5.6* 5.9* 2.7*                                                 Lab Results   Component Value Date    IIBAMNDI34 651 06/14/2017           No results found for: PHENYTOIN, PHENOBARB, VPAT, CBMZ    No results found for:   No results found for: CEA  Lab Results   Component Value Date    DDIMER 0.23 04/09/2017           No results found for: HEPAIGM, HEPBIGM, HEPCAB, HEPBSAB, HEPBSAG  No components found for: HIVCOMBO    Lab Results   Component Value Date    OCCULTBLD NEG 04/15/2015    OCCULTBLD NEG 04/15/2015     Lab Results   Component Value Date    CALCIUM 9.0 02/10/2018    PHOS 4.2 02/10/2018     No  results found for: HCGQUAL, HCGPREGUR    No results found for: SPEP, UPEP      I have reviewed all the lab results.    IMAGING:  Xr Chest Portable 1 View    Result Date: 2/10/2018  Narrative: Exam: Portable Chest AP 02/10/2018    Clinical History: Respiratory failure/fluid status Comparison(s): 2/8/2018 Findings: Emphysematous changes are again noted with hyperinflation of the lungs. No acute appearing infiltrate. Heart size and vascular markings are normal. Some blunting noted of both costophrenic angle suggesting small effusions appearing new.     Impression: 1.  Small bilateral effusions which appear new. No other acute change.    X-ray Chest Portable 1 View    Result Date: 2/8/2018  Narrative: Exam: Chest x-ray - AP portable upright view 02/08/2018 1608 hours Clinical History:  Shortness of breath Comparison/s:  12/2/2017 Findings:  The heart and pulmonary vasculature are normal. The mediastinal contours are normal. Emphysematous changes are present within the lungs, similar to previous. No lung consolidation or pleural effusion is present.     Impression:  Emphysema.        ASSESSMENT AND PLAN:     Assessment/Plan       Active Problems:    COPD exacerbation (CMS/[facility])    COPD with acute exacerbation (CMS/[facility])      Assessment and plan     COPD with acute exacerbation secondary to RSV infection  2/10/2018: Currently on noninvasive positive pressure ventilation.  May need to intubate at some point today.  We will continue to aggressively diurese.  ABG from this morning shows pH 7.3 4/63/84/33/95 percent    COPD, severe, FEV1 0.5 or 19% of predicted in June 2016  2/10/2018: Reviewing home medications which include according to a list Pulmicort, Advair, theophylline, Daliresp.    Anxiety  2/10/2018: Patient has refused in the past any type of medications for anxiety.  May benefit from low-dose Xanax or extended release morphine tablet for anxiety and shortness of breath.    Chronic respiratory acidosis       35  minutes critical care time spent separate from procedures.        Code Status: Full Code

## 2018-02-10 NOTE — INTERDISCIPLINARY/THERAPY
Placed pt back onto NIV at this time for increased SOB and resp distress and desaturations on NC. Q2hr PRN XOP neb placed inline as well. Pt is anxious but does seem to be calming down slightly but remains tachycardic in the upper 120's and lower 130's and tachypneic in the low 30's.

## 2018-02-10 NOTE — PLAN OF CARE
Problem: Respiratory - Adult  Goal: Achieves optimal ventilation and oxygenation with noninvasive CPAP/BiLEVEL support  INTERVENTIONS:  1. Provide education to patient/family about rationale and expected outcomes associated with therapy  2. Position patient to facilitate optimal ventilation/oxygenation status and minimize respiratory effort  3. Position patient to reduce aspiration risk, elevate head of bed at least 35 degrees or higher, as applicable  4. Assess effectiveness of therapy on ventilation/oxygenation status based on oxygen saturation and/or arterial blood gases, as indicated  5. Assess patient for changes in respiratory and physiological status  6. Auscultate breath sounds and assess chest excursion, as indicated  7. Assess patient for changes in mentation and behavior  8. Routinely monitor equipment for proper performance and settings  9. Assess and monitor skin condition, in relationship to the respiratory interface  10. Assure equipment alarm volume is adequate for the patient's environment  11. Immediately respond to equipment alarm, to assess patient and/or cause for alarm  12. Follow universal infection control/hospital policy(ies)/standards   Outcome: Progressing   02/10/18 0040   Interventions Appropriate for this Patient   Achieves Optimal Oxygenation with Noninvasive CPAP/BiLEVEL Support Provide education to patient/family about rationale and expected outcomes associated with therapy;Position patient to facilitate optimal ventilation/oxygenation status and minimize respiratory effort;Position patient to reduce aspiration risk, elevate head of bed at least 35 degrees or higher, as applicable;Assess effectiveness of therapy on ventilation/oxygenation status based on oxygen saturation and/or arterial blood gases, as indicated;Assess patient for changes in respiratory and physiological status;Auscultate breath sounds and assess chest excursion, as indicated;Assess patient for changes in mentation  and behavior;Routinely monitor equipment for proper performance and settings;Assess and monitor skin condition, in relationship to the respiratory interface;Assure equipment alarm volume is adequate for the patient's environment;Follow universal infection control/hospital policy(ies)/standards;Immediately repsond to equipment alarm, to assess patient and/or cause for alarm       Problem: Neurosensory - Adult  Goal: Achieves stable or improved neurological status  INTERVENTIONS  1. Assess for and report changes in neurological status  2. Initiate measures to prevent increased intracranial pressure  3. Maintain blood pressure and fluid volume within ordered parameters to optimize cerebral perfusion and minimize risk of hemorrhage  4. Monitor temperature, glucose, and sodium. Initiate appropriate interventions as ordered   Outcome: Progressing   02/10/18 0040   Interventions Appropriate for this Patient   Achieves stable or improved neurological status Assess for and report changes in neurological status;Initiate measures to prevent increased intracranial pressure;Maintain blood pressure and fluid volume within ordered parameters to optimize cerebral perfusion and minimize risk of hemorrhage;Monitor temperature, glucose, and sodium. Initiate appropriate interventions as ordered     Goal: Absence of seizures  INTERVENTIONS  1. Monitor for seizure activity  2. Administer anti-seizure medications as ordered  3. Monitor neurological status  4. Assist patient in avoiding triggers, if identified   Outcome: Progressing   02/10/18 0040   Interventions Appropriate for this Patient   Absence of seizures Monitor neurological status;Monitor for seizure activity;Administer anti-seizure medications as ordered;Assist patient in avoiding triggers, if identified     Goal: Remains free of injury related to seizures activity  INTERVENTIONS  1. Maintain airway, patient safety  and administer oxygen as ordered  2. Monitor patient for  seizure activity, document and report duration and description of seizure to provider  3. If seizure occurs, turn patient to side and suction secretions as needed  4. Reorient patient post seizure  5. Seizure pads on all 4 side rails  6. Instruct patient/family to notify RN of any seizure activity  7. Instruct patient/family to call for assistance with activity based on assessment   Outcome: Progressing   02/10/18 0040   Interventions Appropriate for this Patient   Remains free of injury related to seizure activity Maintain airway, patient safety and administer oxygen as ordered;Monitor patient for seizure activity, document and report duration and description of seizure to provider;If seizure occurs, turn patient to side and suction secretions as needed;Seizure pads on all 4 side rails;Reorient patient post seizure;Instruct patient/family to notify RN of any seizure activity;Instruct patient/family to call for assistance with activity based on assessment       Problem: Skin/Tissue Integrity - Adult  Goal: Incisions, wounds, or drain sites healing without S/S of infection  INTERVENTIONS  1. Assess and document risk factors for skin breakdown  2. Assess and document skin integrity  3. Assess and document dressing/incision, wound bed, drain sites and surrounding tissue  4. Implement wound care per orders   Outcome: Progressing   02/10/18 0040   Interventions Appropriate for this Patient   Incision(s), wound(s) or drain site(s) healing without S/S of infection Assess and document risk factors for skin breakdown;Assess and document skin integrity       Problem: Infection Control  Goal: MINIMIZE THE ACQUISITION AND TRANSMISSION OF INFECTIOUS AGENTS  INTERVENTIONS:  1. Isolate patient with suspected/diagnosed communicable disease  2. Place on designated isolation precautions  3. Maintain isolation techniques  4. Perform hand hygiene before and after each patient care activity  5. [place] universal precautions  6. Wear  PPE as directed for type of isolation  7. Administer antibiotic therapy as ordered  8. Clean the environment appropriately after each patient use  9. Clean patient care equipment after each patient use as it leaves the room  10. Limit number of visitors, as appropriate   Outcome: Progressing   02/10/18 0040   Interventions Appropriate for this Patient   MINIMIZE THE ACQUISITION AND TRANSMISSION OF INFECT AGENTS  Isolate patient with suspected/diagnosed communicable disease;Place on designated isolation precautions;Maintain isolation techniques;Perform hand hygiene before and after each patient care activity;[place] universal precautions;Wear PPE as directed for type of isolation;Clean the environment appropriately after each patient use;Clean patient care equipment after each patient use as it leaves the room;Educate the patient/visitors on hand hygiene technique and need to complete upon entering/exiting the patient's room;Limit number of visitors, as appropriate;Educate the patient/visitors on how to avoid the spread of infection       Problem: Knowledge Deficit  Goal: Patient/family/caregiver demonstrates understanding of disease process, treatment plan, medications, and discharge instructions  INTERVENTIONS:   1. Complete learning assessment and assess knowledge base  2. Provide teaching at level of understanding   3. Provide teaching via preferred learning methods   Outcome: Progressing   02/10/18 0040   Interventions Appropriate for this Patient   Patient/family/caregiver demonstrates understanding of disease process, treatment plan, medications, and discharge instructions Provide teaching via preferred learning methods;Provide teaching at level of understanding       Problem: Potential for Compromised Skin Integrity  Goal: Skin Integrity is Maintained or Improved  INTERVENTIONS:  1. Assess and monitor skin integrity  2. Collaborate with interdisciplinary team and initiate plans and interventions as  needed  3. Alternate a full bath with partial baths for elderly   4. Monitor patient's hygiene practices   5. Collaborate with wound, ostomy, and continence nurse   Outcome: Progressing   02/10/18 0040   Interventions Appropriate for this Patient   Skin integrity is maintained or improved Assess and monitor skin integrity;Collaborate with interdisciplinary team and initiate plans and interventions as needed;Monitor patient's hygiene practices     Goal: Nutritional status is improving  INTERVENTIONS:  1. Monitor and assess patient for malnutrition (ex- brittle hair, bruises, dry skin, pale skin and conjunctiva, muscle wasting, smooth red tongue, and disorientation)  2. Monitor patient's weight and dietary intake as ordered or per policy  3. Determine patient's food preferences and provide high-protein, high-caloric foods as appropriate  4. Assist patient with eating   5. Allow adequate time for meals   6. Encourage patient to take dietary supplement as ordered   7. Collaborate with dietitian  8. Include patient/family/caregiver in decisions related to nutrition   Outcome: Progressing   02/10/18 0040   Interventions Appropriate for this Patient   Nutritional status is improving Monitor and assess patient for malnutrition (ex- brittle hair, bruises, dry skin, pale skin and conjunctiva, muscle wasting, smooth red tongue, and disorientation);Monitor patient's weight and dietary intake as ordered or per policy;Determine patient's food preferences and provide high-protein, high-caloric foods as appropriate;Allow adequate time for meals;Encourage patient to take dietary supplement as ordered;Collaborate with dietitian;Include patient/family/caregiver in decisions related to nutrition     Goal: MOBILITY IS MAINTAINED OR IMPROVED  INTERVENTIONS  1. Collaborate with interdisciplinary team and initiate plan and interventions as ordered (PT/OT)  2. Encourage ambulation  3. Up to chair for meals  4. Monitor for signs of  deconditioning   Outcome: Progressing   02/10/18 0040   Interventions Appropriate for this Patient   Mobility is Maintained or Improved Collaborate with interdisciplinary team and initiate plan and interventions as ordered (PT/OT);Encourage ambulation;Monitor for signs of deconditioning       Problem: Urinary Incontinence  Goal: Perineal skin integrity is maintained or improved  INTERVENTIONS:  1. Assess genitourinary system, perineal skin, labs (urinalysis), and history of incontinence to include past management, aggravating, and alleviating factors  2. Collaborate with interdisciplinary team including wound, ostomy, and continence nurse and initiate plans and interventions as needed  4. Consider urine containment device  5. Apply skin protectant   6. Develop skin care regimen  7. Provide privacy when changing patient's incontinence device to maintain their dignity   Outcome: Progressing   02/10/18 0040   Interventions Appropriate for this Patient   Perineal skin integrity is maintained or improved Assess genitourinary system, perineal skin, labs (urinalysis), and history of incontinence to include past management, aggravating, and alleviating factors;Consider urine containment device;Apply skin protectant;Develop skin care regimen;Provide privacy when changing patient's incontinence device to maintain their dignity

## 2018-02-11 LAB
ALBUMIN SERPL-MCNC: 3.2 G/DL (ref 3.5–5.3)
ANION GAP SERPL CALC-SCNC: 9 MMOL/L (ref 3–11)
BASOPHILS # BLD AUTO: 0 10*3/UL
BASOPHILS # BLD AUTO: 0 10*3/UL
BASOPHILS NFR BLD AUTO: 0 % (ref 0–2)
BASOPHILS NFR BLD AUTO: 0 % (ref 0–2)
BUN SERPL-MCNC: 19 MG/DL (ref 7–25)
BUN/CREAT SERPL: 38 RATIO
CALCIUM ALBUM COR SERPL-MCNC: 9.4 MG/DL (ref 8.5–10.1)
CALCIUM SERPL-MCNC: 8.8 MG/DL (ref 8.6–10.3)
CHLORIDE SERPL-SCNC: 104 MMOL/L (ref 98–107)
CO2 SERPL-SCNC: 30 MMOL/L (ref 21–32)
CREAT SERPL-MCNC: 0.5 MG/DL (ref 0.6–1.2)
EOSINOPHIL # BLD AUTO: 0 10*3/UL
EOSINOPHIL # BLD AUTO: 0 10*3/UL
EOSINOPHIL NFR BLD AUTO: 0 % (ref 0–3)
EOSINOPHIL NFR BLD AUTO: 0 % (ref 0–3)
ERYTHROCYTE [DISTWIDTH] IN BLOOD BY AUTOMATED COUNT: 16.9 % (ref 11.5–14)
ERYTHROCYTE [DISTWIDTH] IN BLOOD BY AUTOMATED COUNT: 17 % (ref 11.5–14)
GFR SERPL CREATININE-BSD FRML MDRD: 128 ML/MIN/1.73M*2
GLUCOSE SERPL-MCNC: 118 MG/DL (ref 70–105)
HCT VFR BLD AUTO: 35 % (ref 34–45)
HCT VFR BLD AUTO: 42.4 % (ref 34–45)
HGB BLD-MCNC: 11.6 G/DL (ref 11.5–15.5)
HGB BLD-MCNC: 14 G/DL (ref 11.5–15.5)
LACTATE BLDC-SCNC: 1.79 MMOL/L (ref 0.5–1.99)
LYMPHOCYTES # BLD AUTO: 0.9 10*3/UL
LYMPHOCYTES # BLD AUTO: 1.3 10*3/UL
LYMPHOCYTES NFR BLD AUTO: 11 % (ref 11–47)
LYMPHOCYTES NFR BLD AUTO: 9 % (ref 11–47)
MAGNESIUM SERPL-MCNC: 2.3 MG/DL (ref 1.8–2.4)
MCH RBC QN AUTO: 27.4 PG (ref 28–33)
MCH RBC QN AUTO: 27.4 PG (ref 28–33)
MCHC RBC AUTO-ENTMCNC: 33.1 G/DL (ref 32–36)
MCHC RBC AUTO-ENTMCNC: 33.2 G/DL (ref 32–36)
MCV RBC AUTO: 82.5 FL (ref 81–97)
MCV RBC AUTO: 82.7 FL (ref 81–97)
MONOCYTES # BLD AUTO: 0.5 10*3/UL
MONOCYTES # BLD AUTO: 0.8 10*3/UL
MONOCYTES NFR BLD AUTO: 5 % (ref 3–11)
MONOCYTES NFR BLD AUTO: 6 % (ref 3–11)
NEUTROPHILS # BLD AUTO: 12.5 10*3/UL
NEUTROPHILS # BLD AUTO: 6.8 10*3/UL
NEUTROPHILS NFR BLD AUTO: 83 % (ref 41–81)
NEUTROPHILS NFR BLD AUTO: 86 % (ref 41–81)
PHOSPHATE SERPL-MCNC: 3.4 MG/DL (ref 2.5–4.9)
PLATELET # BLD AUTO: 218 10*3/UL (ref 140–350)
PLATELET # BLD AUTO: 234 10*3/UL (ref 140–350)
PMV BLD AUTO: 6.3 FL (ref 6.9–10.8)
PMV BLD AUTO: 6.6 FL (ref 6.9–10.8)
POTASSIUM SERPL-SCNC: 4.6 MMOL/L (ref 3.5–5.1)
RBC # BLD AUTO: 4.24 10*6/ΜL (ref 3.7–5.3)
RBC # BLD AUTO: 5.12 10*6/ΜL (ref 3.7–5.3)
SODIUM SERPL-SCNC: 143 MMOL/L (ref 135–145)
WBC # BLD AUTO: 14.6 10*3/UL (ref 4.5–10.5)
WBC # BLD AUTO: 8.2 10*3/UL (ref 4.5–10.5)

## 2018-02-11 PROCEDURE — 6370000100 HC RX 637 (ALT 250 FOR IP): Performed by: INTERNAL MEDICINE

## 2018-02-11 PROCEDURE — 99291 CRITICAL CARE FIRST HOUR: CPT | Performed by: INTERNAL MEDICINE

## 2018-02-11 PROCEDURE — 80069 RENAL FUNCTION PANEL: CPT | Performed by: INTERNAL MEDICINE

## 2018-02-11 PROCEDURE — 36415 COLL VENOUS BLD VENIPUNCTURE: CPT | Performed by: INTERNAL MEDICINE

## 2018-02-11 PROCEDURE — S0171 BUMETANIDE 0.5 MG: HCPCS | Performed by: INTERNAL MEDICINE

## 2018-02-11 PROCEDURE — 85025 COMPLETE CBC W/AUTO DIFF WBC: CPT | Performed by: INTERNAL MEDICINE

## 2018-02-11 PROCEDURE — 2500000200 HC RX 250 WO HCPCS: Performed by: INTERNAL MEDICINE

## 2018-02-11 PROCEDURE — 6360000200 HC RX 636 W HCPCS (ALT 250 FOR IP): Performed by: INTERNAL MEDICINE

## 2018-02-11 PROCEDURE — 83605 ASSAY OF LACTIC ACID: CPT

## 2018-02-11 PROCEDURE — (BLANK) HC ROOM ICU INTERMEDIATE TRANSITIONAL CARE

## 2018-02-11 PROCEDURE — 83735 ASSAY OF MAGNESIUM: CPT | Performed by: INTERNAL MEDICINE

## 2018-02-11 PROCEDURE — 93005 ELECTROCARDIOGRAM TRACING: CPT | Performed by: INTERNAL MEDICINE

## 2018-02-11 PROCEDURE — 94003 VENT MGMT INPAT SUBQ DAY: CPT

## 2018-02-11 PROCEDURE — 94640 AIRWAY INHALATION TREATMENT: CPT

## 2018-02-11 PROCEDURE — 93010 ELECTROCARDIOGRAM REPORT: CPT | Performed by: INTERNAL MEDICINE

## 2018-02-11 RX ORDER — BUMETANIDE 0.25 MG/ML
1 INJECTION, SOLUTION INTRAMUSCULAR; INTRAVENOUS ONCE
Status: COMPLETED | OUTPATIENT
Start: 2018-02-11 | End: 2018-02-11

## 2018-02-11 RX ORDER — ALPRAZOLAM 0.25 MG/1
0.25 TABLET ORAL NIGHTLY PRN
Status: DISCONTINUED | OUTPATIENT
Start: 2018-02-11 | End: 2018-02-17 | Stop reason: HOSPADM

## 2018-02-11 RX ADMIN — ENOXAPARIN SODIUM 40 MG: 40 INJECTION SUBCUTANEOUS at 13:14

## 2018-02-11 RX ADMIN — IPRATROPIUM BROMIDE 0.5 MG: 0.5 SOLUTION RESPIRATORY (INHALATION) at 20:25

## 2018-02-11 RX ADMIN — ROFLUMILAST 500 MCG: 500 TABLET ORAL at 13:12

## 2018-02-11 RX ADMIN — LEVALBUTEROL 1.25 MG: 1.25 SOLUTION, CONCENTRATE RESPIRATORY (INHALATION) at 07:05

## 2018-02-11 RX ADMIN — LEVALBUTEROL 1.25 MG: 1.25 SOLUTION, CONCENTRATE RESPIRATORY (INHALATION) at 20:25

## 2018-02-11 RX ADMIN — IPRATROPIUM BROMIDE 0.5 MG: 0.5 SOLUTION RESPIRATORY (INHALATION) at 07:05

## 2018-02-11 RX ADMIN — BUMETANIDE 1 MG: 0.25 INJECTION INTRAMUSCULAR; INTRAVENOUS at 08:29

## 2018-02-11 RX ADMIN — ALPRAZOLAM 0.25 MG: 0.25 TABLET ORAL at 15:21

## 2018-02-11 RX ADMIN — THEOPHYLLINE ANHYDROUS 400 MG: 200 CAPSULE, EXTENDED RELEASE ORAL at 08:25

## 2018-02-11 RX ADMIN — ALPRAZOLAM 0.25 MG: 0.25 TABLET ORAL at 08:25

## 2018-02-11 RX ADMIN — METHYLPREDNISOLONE SODIUM SUCCINATE 40 MG: 40 INJECTION, POWDER, FOR SOLUTION INTRAMUSCULAR; INTRAVENOUS at 20:53

## 2018-02-11 RX ADMIN — ALPRAZOLAM 0.25 MG: 0.25 TABLET ORAL at 20:54

## 2018-02-11 RX ADMIN — LEVALBUTEROL 1.25 MG: 1.25 SOLUTION, CONCENTRATE RESPIRATORY (INHALATION) at 11:32

## 2018-02-11 RX ADMIN — IPRATROPIUM BROMIDE 0.5 MG: 0.5 SOLUTION RESPIRATORY (INHALATION) at 14:43

## 2018-02-11 RX ADMIN — ACETAMINOPHEN 650 MG: 325 TABLET ORAL at 08:25

## 2018-02-11 RX ADMIN — METHYLPREDNISOLONE SODIUM SUCCINATE 40 MG: 40 INJECTION, POWDER, FOR SOLUTION INTRAMUSCULAR; INTRAVENOUS at 08:28

## 2018-02-11 RX ADMIN — ESOMEPRAZOLE MAGNESIUM 40 MG: 40 CAPSULE, DELAYED RELEASE ORAL at 15:21

## 2018-02-11 RX ADMIN — LEVALBUTEROL 1.25 MG: 1.25 SOLUTION, CONCENTRATE RESPIRATORY (INHALATION) at 14:43

## 2018-02-11 RX ADMIN — LEVALBUTEROL 1.25 MG: 1.25 SOLUTION, CONCENTRATE RESPIRATORY (INHALATION) at 02:40

## 2018-02-11 NOTE — PROGRESS NOTES
CRITICAL CARE PROGRESS NOTE    Chief complaint:   Acute on chronic hypoxemic respiratory failure secondary to RSV infection.    Events of last night noted, patient seen and examined.  Patient awake, alert answering questions appropriately.  Remains on noninvasive positive pressure ventilation.  One episode of anxiety last night but overall no complications.  Slowly increasing diet, denies any nausea, vomiting.            Current Facility-Administered Medications:   •  acetaminophen (TYLENOL) tablet 325-650 mg, 325-650 mg, oral, q4h PRN, Jose Koenig MD, 650 mg at 02/11/18 0825  •  ALPRAZolam (XANAX) tablet 0.25 mg, 0.25 mg, oral, 3x daily, Jose Short MD, 0.25 mg at 02/11/18 0825  •  ALPRAZolam (XANAX) tablet 0.25 mg, 0.25 mg, oral, Nightly PRN, Jose Short MD  •  alum-mag hydroxide-simeth (MAALOX ADVANCED) suspension 30 mL, 30 mL, oral, 3x daily PRN, Jose Koenig MD  •  bisacodyl (DULCOLAX) suppository 10 mg, 10 mg, rectal, Daily PRN, Jose Koenig MD  •  dexmedetomidine (PRECEDEX) 400 mcg in  mL infusion (premix), 0.2-1.5 mcg/kg/hr, intravenous, Titrated, Raymond Blackmon MD, Stopped at 02/11/18 0400  •  docusate sodium (COLACE) capsule 100 mg, 100 mg, oral, 2x daily, Jose Koenig MD, 100 mg at 02/10/18 2059  •  enoxaparin (LOVENOX) syringe 40 mg, 40 mg, subcutaneous, Daily at 1400, Jose Koenig MD, 40 mg at 02/10/18 1500  •  esomeprazole (NexIUM) capsule 40 mg, 40 mg, oral, Daily at 1600, Jose Short MD, 40 mg at 02/10/18 1500  •  haloperidol lactate (HALDOL) injection 5 mg, 5 mg, intravenous, q6h PRN, Raymond Blackmon MD, 5 mg at 02/09/18 1405  •  ipratropium (ATROVENT) 0.02 % nebulizer solution 0.5 mg, 0.5 mg, nebulization, 3x daily, [place]ander [place] DO, 0.5 mg at 02/11/18 0705  •  levalbuterol (XOPENEX) 1.25 mg/0.5 mL nebulizer solution 1.25 mg, 1.25 mg, nebulization, q2h PRN, Raymond Blackmon MD, 1.25 mg at 02/11/18 1132  •  levalbuterol (XOPENEX) 1.25 mg/0.5 mL nebulizer solution  1.25 mg, 1.25 mg, nebulization, 3x daily, Jose Short MD, 1.25 mg at 02/11/18 0705  •  magnesium hydroxide (MILK OF MAGNESIA) 400 mg/5 mL oral suspension 30 mL, 30 mL, oral, Daily PRN, Jose Koenig MD  •  magnesium sulfate 2 g in SWFI 50 mL IVPB (premix), 2 g, intravenous, Once PRN, [place] Park DO  •  methylPREDNISolone sod suc(PF) (Solu-MEDROL) injection 40 mg, 40 mg, intravenous, q12h TASHA, Raymond Blackmon MD, 40 mg at 02/11/18 0828  •  ondansetron (ZOFRAN) tablet 4 mg, 4 mg, oral, q6h PRN **OR** ondansetron (ZOFRAN) injection 4 mg, 4 mg, intravenous, q6h PRN, Jose Koenig MD  •  polyvinyl alcohol (ARTIFICIAL TEARS) 1.4 % ophthalmic solution 1-2 drop, 1-2 drop, Both Eyes, PRN, Alexsander Park DO  •  roflumilast (DALIRESP) tablet 500 mcg, 500 mcg, oral, Daily with lunch, Jose Short MD, 500 mcg at 02/10/18 1420  •  Insert peripheral IV, , , Once **AND** Maintain IV access, , , Until discontinued **AND** Saline lock IV, , , Once **AND** sodium chloride flush 3 mL, 3 mL, intravenous, PRN, Jose Koenig MD  •  sodium chloride-aloe vera (AYR) nasal gel 1 application, 1 application, Each Nostril, PRN, Alexsander Park DO  •  theophylline (JAZMYNE-24) 24 hr capsule 400 mg, 400 mg, oral, Daily, Jose Short MD, 400 mg at 02/11/18 0825  •  traZODone (DESYREL) tablet 25 mg, 25 mg, oral, Nightly PRN, Jose Koenig MD  •  white petrolatum-mineral oil (LACRILUBE) ophthalmic ointment 1 application, 1 application, Both Eyes, 2x daily, Alexsander Park DO  •  white petrolatum-mineral oil (LACRILUBE) ophthalmic ointment 1 application, 1 application, Both Eyes, PRN, Alexsander Park DO     Objective       VITAL SIGNS:  Temp:  [36.2 °C (97.2 °F)-36.8 °C (98.2 °F)] 36.7 °C (98.1 °F)  Heart Rate:  [] 140  Resp:  [16-36] 19  BP: ()/(52-98) 94/79  FiO2 (%):  [40 %-65 %] 65 %      Intake/Output last 3 shifts:  I/O last 3 completed shifts:  In: 3118 [P.O.:820; I.V.:2298]  Out: 1150 [Urine:1150]  Intake/Output  this shift:  I/O this shift:  In: 320 [I.V.:320]  Out: 2250 [Urine:2250]       PHYSICAL EXAM:   Physical Exam   HENT:   Head: Normocephalic.   Eyes: Pupils are equal, round, and reactive to light.   Neck: No JVD present. Carotid bruit is not present. No thyroid mass and no thyromegaly present.   Cardiovascular: Regular rhythm and S1 normal.    Murmur heard.   Systolic murmur is present   Pulmonary/Chest: Accessory muscle usage present. Tachypnea noted. She has decreased breath sounds in the right lower field and the left lower field.   Abdominal: Normal appearance and bowel sounds are normal. There is no hepatosplenomegaly.   Nursing note and vitals reviewed.          RESULTS AND DECISION MAKING:          Results from last 7 days  Lab Units 02/11/18  1001 02/11/18  0436 02/10/18  2225   WBC AUTO 10*3/uL 14.6* 8.2 10.3   HEMOGLOBIN g/dL 14.0 11.6 11.2*   HEMATOCRIT % 42.4 35.0 34.2   PLATELETS AUTO 10*3/uL 234 218 227       Results from last 7 days  Lab Units 02/11/18  0436 02/10/18  0407 02/09/18  0341 02/08/18  1547   SODIUM mmol/L 143 145 142 141   POTASSIUM mmol/L 4.6 4.1 3.9 4.3   CHLORIDE mmol/L 104 105 102 103   CO2 mmol/L 30 32 29 30   BUN mg/dL 19 30* 21 17   CREATININE mg/dL 0.5* 0.7 0.6 0.7   CALCIUM mg/dL 8.8 9.0 8.8 9.5   TOTAL PROTEIN g/dL  --   --  5.9* 6.8   BILIRUBIN TOTAL mg/dL  --   --  0.32 0.34   ALK PHOS U/L  --   --  63 69   ALT U/L  --   --  22 26   AST U/L  --   --  11 15   GLUCOSE mg/dL 118* 143* 149* 144*       Results from last 7 days  Lab Units 02/10/18  0617 02/09/18  1507 02/08/18  1821   POCT PH, ARTERIAL  7.34* 7.40 7.40   POCT PCO2, ARTERIAL mmHg 63.0* 53.3* 46.4*   POCT PO2, ARTERIAL mmHg 84.1* 353.7* 91.6*   POCT HCO3, ARTERIAL mmol/L 33.0* 32.0* 28.0   BEART mmol/L 5.6* 5.9* 2.7*                                                 Lab Results   Component Value Date    MBCBHGAB28 651 06/14/2017           No results found for: PHENYTOIN, PHENOBARB, VPAT, CBMZ    No results found for:    No results found for: CEA  Lab Results   Component Value Date    DDIMER 0.23 04/09/2017           No results found for: HEPAIGM, HEPBIGM, HEPCAB, HEPBSAB, HEPBSAG  No components found for: HIVCOMBO    Lab Results   Component Value Date    OCCULTBLD NEG 04/15/2015    OCCULTBLD NEG 04/15/2015     Lab Results   Component Value Date    CALCIUM 8.8 02/11/2018    PHOS 3.4 02/11/2018     No results found for: HCGQUAL, HCGPREGUR    No results found for: SPEP, UPEP      I have reviewed all the lab results.    IMAGING:  Xr Chest Portable 1 View    Result Date: 2/10/2018  Narrative: Exam: Portable Chest AP 02/10/2018    Clinical History: Respiratory failure/fluid status Comparison(s): 2/8/2018 Findings: Emphysematous changes are again noted with hyperinflation of the lungs. No acute appearing infiltrate. Heart size and vascular markings are normal. Some blunting noted of both costophrenic angle suggesting small effusions appearing new.     Impression: 1.  Small bilateral effusions which appear new. No other acute change.    X-ray Chest Portable 1 View    Result Date: 2/8/2018  Narrative: Exam: Chest x-ray - AP portable upright view 02/08/2018 1608 hours Clinical History:  Shortness of breath Comparison/s:  12/2/2017 Findings:  The heart and pulmonary vasculature are normal. The mediastinal contours are normal. Emphysematous changes are present within the lungs, similar to previous. No lung consolidation or pleural effusion is present.     Impression:  Emphysema.        ASSESSMENT AND PLAN:     Assessment/Plan       Active Problems:    COPD exacerbation (CMS/[facility])    COPD with acute exacerbation (CMS/[facility])      Assessment and plan     COPD with acute exacerbation secondary to RSV infection  2/10/2018: Currently on noninvasive positive pressure ventilation.  May need to intubate at some point today.  We will continue to aggressively diurese.  ABG from this morning shows pH 7.3 4/63/84/33/95 percent  2/11/2018: Overall  stable.  Continue to diurese.  High flow nasal cannula used today to give break from noninvasive positive pressure ventilation.    COPD, severe, FEV1 0.5 or 19% of predicted in June 2016  2/10/2018: Reviewing home medications which include according to a list Pulmicort, Advair, theophylline, Daliresp.    Anxiety  2/10/2018: Patient has refused in the past any type of medications for anxiety.  May benefit from low-dose Xanax or extended release morphine tablet for anxiety and shortness of breath.  2/11/2018: Xanax appears to work well, will add as needed dose tonight.    Chronic respiratory acidosis     Protein calorie malnutrition, mild      35 minutes critical care time spent separate from procedures.        Code Status: Full Code

## 2018-02-11 NOTE — PLAN OF CARE
Problem: Respiratory - Adult  Goal: Achieves optimal ventilation and oxygenation with noninvasive CPAP/BiLEVEL support  INTERVENTIONS:  1. Provide education to patient/family about rationale and expected outcomes associated with therapy  2. Position patient to facilitate optimal ventilation/oxygenation status and minimize respiratory effort  3. Position patient to reduce aspiration risk, elevate head of bed at least 35 degrees or higher, as applicable  4. Assess effectiveness of therapy on ventilation/oxygenation status based on oxygen saturation and/or arterial blood gases, as indicated  5. Assess patient for changes in respiratory and physiological status  6. Auscultate breath sounds and assess chest excursion, as indicated  7. Assess patient for changes in mentation and behavior  8. Routinely monitor equipment for proper performance and settings  9. Assess and monitor skin condition, in relationship to the respiratory interface  10. Assure equipment alarm volume is adequate for the patient's environment  11. Immediately respond to equipment alarm, to assess patient and/or cause for alarm  12. Follow universal infection control/hospital policy(ies)/standards   Outcome: Progressing   02/11/18 0156   Interventions Appropriate for this Patient   Achieves Optimal Oxygenation with Noninvasive CPAP/BiLEVEL Support Provide education to patient/family about rationale and expected outcomes associated with therapy;Position patient to facilitate optimal ventilation/oxygenation status and minimize respiratory effort;Position patient to reduce aspiration risk, elevate head of bed at least 35 degrees or higher, as applicable;Assess effectiveness of therapy on ventilation/oxygenation status based on oxygen saturation and/or arterial blood gases, as indicated;Assess patient for changes in respiratory and physiological status;Auscultate breath sounds and assess chest excursion, as indicated;Assess patient for changes in mentation  and behavior;Routinely monitor equipment for proper performance and settings;Assess and monitor skin condition, in relationship to the respiratory interface;Assure equipment alarm volume is adequate for the patient's environment;Immediately repsond to equipment alarm, to assess patient and/or cause for alarm;Follow universal infection control/hospital policy(ies)/standards       Problem: Neurosensory - Adult  Goal: Achieves stable or improved neurological status  INTERVENTIONS  1. Assess for and report changes in neurological status  2. Initiate measures to prevent increased intracranial pressure  3. Maintain blood pressure and fluid volume within ordered parameters to optimize cerebral perfusion and minimize risk of hemorrhage  4. Monitor temperature, glucose, and sodium. Initiate appropriate interventions as ordered   Outcome: Progressing   02/11/18 0156   Interventions Appropriate for this Patient   Achieves stable or improved neurological status Assess for and report changes in neurological status;Initiate measures to prevent increased intracranial pressure;Maintain blood pressure and fluid volume within ordered parameters to optimize cerebral perfusion and minimize risk of hemorrhage;Monitor temperature, glucose, and sodium. Initiate appropriate interventions as ordered     Goal: Absence of seizures  INTERVENTIONS  1. Monitor for seizure activity  2. Administer anti-seizure medications as ordered  3. Monitor neurological status  4. Assist patient in avoiding triggers, if identified   Outcome: Progressing   02/11/18 0156   Interventions Appropriate for this Patient   Absence of seizures Monitor for seizure activity;Administer anti-seizure medications as ordered;Monitor neurological status;Assist patient in avoiding triggers, if identified     Goal: Remains free of injury related to seizures activity  INTERVENTIONS  1. Maintain airway, patient safety  and administer oxygen as ordered  2. Monitor patient for  seizure activity, document and report duration and description of seizure to provider  3. If seizure occurs, turn patient to side and suction secretions as needed  4. Reorient patient post seizure  5. Seizure pads on all 4 side rails  6. Instruct patient/family to notify RN of any seizure activity  7. Instruct patient/family to call for assistance with activity based on assessment   Outcome: Progressing   02/11/18 0156   Interventions Appropriate for this Patient   Remains free of injury related to seizure activity Maintain airway, patient safety and administer oxygen as ordered;Monitor patient for seizure activity, document and report duration and description of seizure to provider;If seizure occurs, turn patient to side and suction secretions as needed;Reorient patient post seizure       Problem: Skin/Tissue Integrity - Adult  Goal: Incisions, wounds, or drain sites healing without S/S of infection  INTERVENTIONS  1. Assess and document risk factors for skin breakdown  2. Assess and document skin integrity  3. Assess and document dressing/incision, wound bed, drain sites and surrounding tissue  4. Implement wound care per orders   Outcome: Progressing   02/11/18 0156   Interventions Appropriate for this Patient   Incision(s), wound(s) or drain site(s) healing without S/S of infection Assess and document risk factors for skin breakdown;Assess and document skin integrity       Problem: Infection Control  Goal: MINIMIZE THE ACQUISITION AND TRANSMISSION OF INFECTIOUS AGENTS  INTERVENTIONS:  1. Isolate patient with suspected/diagnosed communicable disease  2. Place on designated isolation precautions  3. Maintain isolation techniques  4. Perform hand hygiene before and after each patient care activity  5. [place] universal precautions  6. Wear PPE as directed for type of isolation  7. Administer antibiotic therapy as ordered  8. Clean the environment appropriately after each patient use  9. Clean patient care  equipment after each patient use as it leaves the room  10. Limit number of visitors, as appropriate   Outcome: Progressing   02/11/18 0156   Interventions Appropriate for this Patient   MINIMIZE THE ACQUISITION AND TRANSMISSION OF INFECT AGENTS  Isolate patient with suspected/diagnosed communicable disease;Place on designated isolation precautions;Maintain isolation techniques;Perform hand hygiene before and after each patient care activity;[place] universal precautions;Wear PPE as directed for type of isolation;Administer antibiotic therapy as ordered;Clean the environment appropriately after each patient use;Clean patient care equipment after each patient use as it leaves the room;Limit number of visitors, as appropriate;Educate the patient/visitors on hand hygiene technique and need to complete upon entering/exiting the patient's room;Educate the patient/visitors on how to avoid the spread of infection       Problem: Knowledge Deficit  Goal: Patient/family/caregiver demonstrates understanding of disease process, treatment plan, medications, and discharge instructions  INTERVENTIONS:   1. Complete learning assessment and assess knowledge base  2. Provide teaching at level of understanding   3. Provide teaching via preferred learning methods   Outcome: Progressing   02/11/18 0156   Interventions Appropriate for this Patient   Patient/family/caregiver demonstrates understanding of disease process, treatment plan, medications, and discharge instructions Complete learning assessment and assess knowledge base;Provide teaching at level of understanding;Provide teaching via preferred learning methods       Problem: Potential for Compromised Skin Integrity  Goal: Skin Integrity is Maintained or Improved  INTERVENTIONS:  1. Assess and monitor skin integrity  2. Collaborate with interdisciplinary team and initiate plans and interventions as needed  3. Alternate a full bath with partial baths for elderly   4. Monitor  patient's hygiene practices   5. Collaborate with wound, ostomy, and continence nurse   Outcome: Progressing   02/11/18 0156   Interventions Appropriate for this Patient   Skin integrity is maintained or improved Assess and monitor skin integrity;Collaborate with interdisciplinary team and initiate plans and interventions as needed;Monitor patient's hygiene practices     Goal: Nutritional status is improving  INTERVENTIONS:  1. Monitor and assess patient for malnutrition (ex- brittle hair, bruises, dry skin, pale skin and conjunctiva, muscle wasting, smooth red tongue, and disorientation)  2. Monitor patient's weight and dietary intake as ordered or per policy  3. Determine patient's food preferences and provide high-protein, high-caloric foods as appropriate  4. Assist patient with eating   5. Allow adequate time for meals   6. Encourage patient to take dietary supplement as ordered   7. Collaborate with dietitian  8. Include patient/family/caregiver in decisions related to nutrition   Outcome: Not Progressing   02/11/18 0156   Interventions Appropriate for this Patient   Nutritional status is improving Monitor and assess patient for malnutrition (ex- brittle hair, bruises, dry skin, pale skin and conjunctiva, muscle wasting, smooth red tongue, and disorientation);Monitor patient's weight and dietary intake as ordered or per policy;Determine patient's food preferences and provide high-protein, high-caloric foods as appropriate;Allow adequate time for meals     Goal: MOBILITY IS MAINTAINED OR IMPROVED  INTERVENTIONS  1. Collaborate with interdisciplinary team and initiate plan and interventions as ordered (PT/OT)  2. Encourage ambulation  3. Up to chair for meals  4. Monitor for signs of deconditioning   Outcome: Progressing   02/11/18 0156   Interventions Appropriate for this Patient   Mobility is Maintained or Improved Collaborate with interdisciplinary team and initiate plan and interventions as ordered  (PT/OT);Up to chair for meals       Problem: Urinary Incontinence  Goal: Perineal skin integrity is maintained or improved  INTERVENTIONS:  1. Assess genitourinary system, perineal skin, labs (urinalysis), and history of incontinence to include past management, aggravating, and alleviating factors  2. Collaborate with interdisciplinary team including wound, ostomy, and continence nurse and initiate plans and interventions as needed  4. Consider urine containment device  5. Apply skin protectant   6. Develop skin care regimen  7. Provide privacy when changing patient's incontinence device to maintain their dignity   Outcome: Progressing   02/11/18 0156   Interventions Appropriate for this Patient   Perineal skin integrity is maintained or improved Assess genitourinary system, perineal skin, labs (urinalysis), and history of incontinence to include past management, aggravating, and alleviating factors;Collaborate with interdisciplinary team including wound, ostomy, and continence nurse and initiate plans and interventions as needed;Consider urine containment device;Apply skin protectant;Develop skin care regimen;Provide privacy when changing patient's incontinence device to maintain their dignity

## 2018-02-11 NOTE — PLAN OF CARE
Problem: Respiratory - Adult  Goal: Achieves optimal ventilation and oxygenation with noninvasive CPAP/BiLEVEL support  INTERVENTIONS:  1. Provide education to patient/family about rationale and expected outcomes associated with therapy  2. Position patient to facilitate optimal ventilation/oxygenation status and minimize respiratory effort  3. Position patient to reduce aspiration risk, elevate head of bed at least 35 degrees or higher, as applicable  4. Assess effectiveness of therapy on ventilation/oxygenation status based on oxygen saturation and/or arterial blood gases, as indicated  5. Assess patient for changes in respiratory and physiological status  6. Auscultate breath sounds and assess chest excursion, as indicated  7. Assess patient for changes in mentation and behavior  8. Routinely monitor equipment for proper performance and settings  9. Assess and monitor skin condition, in relationship to the respiratory interface  10. Assure equipment alarm volume is adequate for the patient's environment  11. Immediately respond to equipment alarm, to assess patient and/or cause for alarm  12. Follow universal infection control/hospital policy(ies)/standards   Outcome: Progressing   02/11/18 1203   Interventions Appropriate for this Patient   Achieves Optimal Oxygenation with Noninvasive CPAP/BiLEVEL Support Provide education to patient/family about rationale and expected outcomes associated with therapy;Position patient to facilitate optimal ventilation/oxygenation status and minimize respiratory effort;Position patient to reduce aspiration risk, elevate head of bed at least 35 degrees or higher, as applicable;Assess effectiveness of therapy on ventilation/oxygenation status based on oxygen saturation and/or arterial blood gases, as indicated;Assess patient for changes in respiratory and physiological status;Auscultate breath sounds and assess chest excursion, as indicated;Routinely monitor equipment for proper  performance and settings;Assess patient for changes in mentation and behavior;Assess and monitor skin condition, in relationship to the respiratory interface;Follow universal infection control/hospital policy(ies)/standards;Immediately repsond to equipment alarm, to assess patient and/or cause for alarm;Assure equipment alarm volume is adequate for the patient's environment       Problem: Neurosensory - Adult  Goal: Achieves stable or improved neurological status  INTERVENTIONS  1. Assess for and report changes in neurological status  2. Initiate measures to prevent increased intracranial pressure  3. Maintain blood pressure and fluid volume within ordered parameters to optimize cerebral perfusion and minimize risk of hemorrhage  4. Monitor temperature, glucose, and sodium. Initiate appropriate interventions as ordered   Outcome: Progressing   02/11/18 1203   Interventions Appropriate for this Patient   Achieves stable or improved neurological status Assess for and report changes in neurological status;Initiate measures to prevent increased intracranial pressure;Maintain blood pressure and fluid volume within ordered parameters to optimize cerebral perfusion and minimize risk of hemorrhage;Monitor temperature, glucose, and sodium. Initiate appropriate interventions as ordered     Goal: Absence of seizures  INTERVENTIONS  1. Monitor for seizure activity  2. Administer anti-seizure medications as ordered  3. Monitor neurological status  4. Assist patient in avoiding triggers, if identified   Outcome: Completed Date Met: 02/11/18 02/11/18 1203   Interventions Appropriate for this Patient   Absence of seizures Monitor for seizure activity;Monitor neurological status;Assist patient in avoiding triggers, if identified     Goal: Remains free of injury related to seizures activity  INTERVENTIONS  1. Maintain airway, patient safety  and administer oxygen as ordered  2. Monitor patient for seizure activity, document and  report duration and description of seizure to provider  3. If seizure occurs, turn patient to side and suction secretions as needed  4. Reorient patient post seizure  5. Seizure pads on all 4 side rails  6. Instruct patient/family to notify RN of any seizure activity  7. Instruct patient/family to call for assistance with activity based on assessment   Outcome: Completed Date Met: 02/11/18 02/11/18 1203   Interventions Appropriate for this Patient   Remains free of injury related to seizure activity Maintain airway, patient safety and administer oxygen as ordered;Monitor patient for seizure activity, document and report duration and description of seizure to provider;Instruct patient/family to call for assistance with activity based on assessment       Problem: Skin/Tissue Integrity - Adult  Goal: Incisions, wounds, or drain sites healing without S/S of infection  INTERVENTIONS  1. Assess and document risk factors for skin breakdown  2. Assess and document skin integrity  3. Assess and document dressing/incision, wound bed, drain sites and surrounding tissue  4. Implement wound care per orders   Outcome: Progressing   02/11/18 1203   Interventions Appropriate for this Patient   Incision(s), wound(s) or drain site(s) healing without S/S of infection Assess and document skin integrity;Assess and document dressing/incision, wound bed, drain sites and surrounding tissue       Problem: Infection Control  Goal: MINIMIZE THE ACQUISITION AND TRANSMISSION OF INFECTIOUS AGENTS  INTERVENTIONS:  1. Isolate patient with suspected/diagnosed communicable disease  2. Place on designated isolation precautions  3. Maintain isolation techniques  4. Perform hand hygiene before and after each patient care activity  5. [place] universal precautions  6. Wear PPE as directed for type of isolation  7. Administer antibiotic therapy as ordered  8. Clean the environment appropriately after each patient use  9. Clean patient care equipment  after each patient use as it leaves the room  10. Limit number of visitors, as appropriate   Outcome: Progressing   02/11/18 1203   Interventions Appropriate for this Patient   MINIMIZE THE ACQUISITION AND TRANSMISSION OF INFECT AGENTS  Isolate patient with suspected/diagnosed communicable disease;Place on designated isolation precautions;Perform hand hygiene before and after each patient care activity;Maintain isolation techniques;[place] universal precautions;Wear PPE as directed for type of isolation;Administer antibiotic therapy as ordered;Clean the environment appropriately after each patient use;Clean patient care equipment after each patient use as it leaves the room;Limit number of visitors, as appropriate;Educate the patient/visitors on hand hygiene technique and need to complete upon entering/exiting the patient's room;Educate the patient/visitors on how to avoid the spread of infection       Problem: Knowledge Deficit  Goal: Patient/family/caregiver demonstrates understanding of disease process, treatment plan, medications, and discharge instructions  INTERVENTIONS:   1. Complete learning assessment and assess knowledge base  2. Provide teaching at level of understanding   3. Provide teaching via preferred learning methods   Outcome: Progressing   02/11/18 1203   Interventions Appropriate for this Patient   Patient/family/caregiver demonstrates understanding of disease process, treatment plan, medications, and discharge instructions Complete learning assessment and assess knowledge base;Provide teaching via preferred learning methods;Provide teaching at level of understanding       Problem: Potential for Compromised Skin Integrity  Goal: Skin Integrity is Maintained or Improved  INTERVENTIONS:  1. Assess and monitor skin integrity  2. Collaborate with interdisciplinary team and initiate plans and interventions as needed  3. Alternate a full bath with partial baths for elderly   4. Monitor patient's  hygiene practices   5. Collaborate with wound, ostomy, and continence nurse   Outcome: Progressing   02/11/18 1203   Interventions Appropriate for this Patient   Skin integrity is maintained or improved Assess and monitor skin integrity;Collaborate with interdisciplinary team and initiate plans and interventions as needed;Alternate a full bath with partial baths for elderly;Monitor patient's hygiene practices;Collaborate with wound, ostomy, and continence nurse     Goal: Nutritional status is improving  INTERVENTIONS:  1. Monitor and assess patient for malnutrition (ex- brittle hair, bruises, dry skin, pale skin and conjunctiva, muscle wasting, smooth red tongue, and disorientation)  2. Monitor patient's weight and dietary intake as ordered or per policy  3. Determine patient's food preferences and provide high-protein, high-caloric foods as appropriate  4. Assist patient with eating   5. Allow adequate time for meals   6. Encourage patient to take dietary supplement as ordered   7. Collaborate with dietitian  8. Include patient/family/caregiver in decisions related to nutrition   Outcome: Progressing   02/11/18 1203   Interventions Appropriate for this Patient   Nutritional status is improving Monitor and assess patient for malnutrition (ex- brittle hair, bruises, dry skin, pale skin and conjunctiva, muscle wasting, smooth red tongue, and disorientation);Monitor patient's weight and dietary intake as ordered or per policy;Determine patient's food preferences and provide high-protein, high-caloric foods as appropriate;Encourage patient to take dietary supplement as ordered;Collaborate with dietitian;Include patient/family/caregiver in decisions related to nutrition;Assist patient with eating;Allow adequate time for meals     Goal: MOBILITY IS MAINTAINED OR IMPROVED  INTERVENTIONS  1. Collaborate with interdisciplinary team and initiate plan and interventions as ordered (PT/OT)  2. Encourage ambulation  3. Up to  chair for meals  4. Monitor for signs of deconditioning   Outcome: Progressing   02/11/18 1203   Interventions Appropriate for this Patient   Mobility is Maintained or Improved Collaborate with interdisciplinary team and initiate plan and interventions as ordered (PT/OT);Encourage ambulation;Up to chair for meals;Monitor for signs of deconditioning       Problem: Urinary Incontinence  Goal: Perineal skin integrity is maintained or improved  INTERVENTIONS:  1. Assess genitourinary system, perineal skin, labs (urinalysis), and history of incontinence to include past management, aggravating, and alleviating factors  2. Collaborate with interdisciplinary team including wound, ostomy, and continence nurse and initiate plans and interventions as needed  4. Consider urine containment device  5. Apply skin protectant   6. Develop skin care regimen  7. Provide privacy when changing patient's incontinence device to maintain their dignity   Outcome: Progressing   02/11/18 1203   Interventions Appropriate for this Patient   Perineal skin integrity is maintained or improved Assess genitourinary system, perineal skin, labs (urinalysis), and history of incontinence to include past management, aggravating, and alleviating factors;Collaborate with interdisciplinary team including wound, ostomy, and continence nurse and initiate plans and interventions as needed;Consider urine containment device;Apply skin protectant;Develop skin care regimen;Provide privacy when changing patient's incontinence device to maintain their dignity       Problem: Safety Adult - Fall  Goal: Free from fall injury  INTERVENTIONS:    Please reference Cares/Safety Flowsheet under [facility]ume Fall Risk for interventions.   Outcome: Progressing  Fall precautions in place

## 2018-02-11 NOTE — PLAN OF CARE
Problem: Respiratory - Adult  Goal: Achieves optimal ventilation and oxygenation with noninvasive CPAP/BiLEVEL support  INTERVENTIONS:  1. Provide education to patient/family about rationale and expected outcomes associated with therapy  2. Position patient to facilitate optimal ventilation/oxygenation status and minimize respiratory effort  3. Position patient to reduce aspiration risk, elevate head of bed at least 35 degrees or higher, as applicable  4. Assess effectiveness of therapy on ventilation/oxygenation status based on oxygen saturation and/or arterial blood gases, as indicated  5. Assess patient for changes in respiratory and physiological status  6. Auscultate breath sounds and assess chest excursion, as indicated  7. Assess patient for changes in mentation and behavior  8. Routinely monitor equipment for proper performance and settings  9. Assess and monitor skin condition, in relationship to the respiratory interface  10. Assure equipment alarm volume is adequate for the patient's environment  11. Immediately respond to equipment alarm, to assess patient and/or cause for alarm  12. Follow universal infection control/hospital policy(ies)/standards   Outcome: Progressing   02/10/18 2057   Interventions Appropriate for this Patient   Achieves Optimal Oxygenation with Noninvasive CPAP/BiLEVEL Support Provide education to patient/family about rationale and expected outcomes associated with therapy;Position patient to facilitate optimal ventilation/oxygenation status and minimize respiratory effort;Position patient to reduce aspiration risk, elevate head of bed at least 35 degrees or higher, as applicable;Assess effectiveness of therapy on ventilation/oxygenation status based on oxygen saturation and/or arterial blood gases, as indicated;Assess patient for changes in respiratory and physiological status;Auscultate breath sounds and assess chest excursion, as indicated;Assess patient for changes in mentation  and behavior;Routinely monitor equipment for proper performance and settings;Assess and monitor skin condition, in relationship to the respiratory interface;Assure equipment alarm volume is adequate for the patient's environment;Immediately repsond to equipment alarm, to assess patient and/or cause for alarm;Follow universal infection control/hospital policy(ies)/standards

## 2018-02-12 ENCOUNTER — TELEPHONE (OUTPATIENT)
Dept: PULMONOLOGY | Facility: CLINIC | Age: 58
End: 2018-02-12

## 2018-02-12 LAB
ALBUMIN SERPL-MCNC: 3.5 G/DL (ref 3.5–5.3)
ANION GAP SERPL CALC-SCNC: 10 MMOL/L (ref 3–11)
BASOPHILS # BLD AUTO: 0 10*3/UL
BASOPHILS NFR BLD AUTO: 0 % (ref 0–2)
BUN SERPL-MCNC: 17 MG/DL (ref 7–25)
BUN/CREAT SERPL: 28.33 RATIO
CALCIUM ALBUM COR SERPL-MCNC: 9.7 MG/DL (ref 8.5–10.1)
CALCIUM SERPL-MCNC: 9.3 MG/DL (ref 8.6–10.3)
CHLORIDE SERPL-SCNC: 98 MMOL/L (ref 98–107)
CO2 SERPL-SCNC: 34 MMOL/L (ref 21–32)
CREAT SERPL-MCNC: 0.6 MG/DL (ref 0.6–1.2)
EOSINOPHIL # BLD AUTO: 0 10*3/UL
EOSINOPHIL NFR BLD AUTO: 0 % (ref 0–3)
ERYTHROCYTE [DISTWIDTH] IN BLOOD BY AUTOMATED COUNT: 16.9 % (ref 11.5–14)
GFR SERPL CREATININE-BSD FRML MDRD: 104 ML/MIN/1.73M*2
GLUCOSE SERPL-MCNC: 161 MG/DL (ref 70–105)
HCT VFR BLD AUTO: 39.8 % (ref 34–45)
HGB BLD-MCNC: 13 G/DL (ref 11.5–15.5)
HYPOCHROMIA PRESENCE IN BLOOD, ANALYZER: ABNORMAL
LACTATE BLDC-SCNC: 1.81 MMOL/L (ref 0.5–1.99)
LYMPHOCYTES # BLD AUTO: 1 10*3/UL
LYMPHOCYTES NFR BLD AUTO: 7 % (ref 11–47)
MAGNESIUM SERPL-MCNC: 2.2 MG/DL (ref 1.8–2.4)
MCH RBC QN AUTO: 26.7 PG (ref 28–33)
MCHC RBC AUTO-ENTMCNC: 32.6 G/DL (ref 32–36)
MCV RBC AUTO: 82 FL (ref 81–97)
MONOCYTES # BLD AUTO: 0.6 10*3/UL
MONOCYTES NFR BLD AUTO: 5 % (ref 3–11)
NEUTROPHILS # BLD AUTO: 11.5 10*3/UL
NEUTROPHILS NFR BLD AUTO: 88 % (ref 41–81)
PHOSPHATE SERPL-MCNC: 4.3 MG/DL (ref 2.5–4.9)
PLATELET # BLD AUTO: 244 10*3/UL (ref 140–350)
PMV BLD AUTO: 6.8 FL (ref 6.9–10.8)
POTASSIUM SERPL-SCNC: 3.9 MMOL/L (ref 3.5–5.1)
RBC # BLD AUTO: 4.85 10*6/ΜL (ref 3.7–5.3)
SODIUM SERPL-SCNC: 142 MMOL/L (ref 135–145)
WBC # BLD AUTO: 13.1 10*3/UL (ref 4.5–10.5)

## 2018-02-12 PROCEDURE — 94640 AIRWAY INHALATION TREATMENT: CPT

## 2018-02-12 PROCEDURE — 97530 THERAPEUTIC ACTIVITIES: CPT

## 2018-02-12 PROCEDURE — 6360000200 HC RX 636 W HCPCS (ALT 250 FOR IP): Performed by: INTERNAL MEDICINE

## 2018-02-12 PROCEDURE — 97530 THERAPEUTIC ACTIVITIES: CPT | Mod: GP

## 2018-02-12 PROCEDURE — 83605 ASSAY OF LACTIC ACID: CPT

## 2018-02-12 PROCEDURE — 2590000100 HC RX 259: Performed by: INTERNAL MEDICINE

## 2018-02-12 PROCEDURE — 6370000100 HC RX 637 (ALT 250 FOR IP): Performed by: INTERNAL MEDICINE

## 2018-02-12 PROCEDURE — 36415 COLL VENOUS BLD VENIPUNCTURE: CPT | Performed by: INTERNAL MEDICINE

## 2018-02-12 PROCEDURE — 99291 CRITICAL CARE FIRST HOUR: CPT | Performed by: STUDENT IN AN ORGANIZED HEALTH CARE EDUCATION/TRAINING PROGRAM

## 2018-02-12 PROCEDURE — (BLANK) HC ROOM ICU INTERMEDIATE

## 2018-02-12 PROCEDURE — 83735 ASSAY OF MAGNESIUM: CPT | Performed by: INTERNAL MEDICINE

## 2018-02-12 PROCEDURE — 85025 COMPLETE CBC W/AUTO DIFF WBC: CPT | Performed by: INTERNAL MEDICINE

## 2018-02-12 PROCEDURE — 80069 RENAL FUNCTION PANEL: CPT | Performed by: INTERNAL MEDICINE

## 2018-02-12 RX ADMIN — ESOMEPRAZOLE MAGNESIUM 40 MG: 40 CAPSULE, DELAYED RELEASE ORAL at 17:30

## 2018-02-12 RX ADMIN — IPRATROPIUM BROMIDE 0.5 MG: 0.5 SOLUTION RESPIRATORY (INHALATION) at 08:39

## 2018-02-12 RX ADMIN — ROFLUMILAST 500 MCG: 500 TABLET ORAL at 11:32

## 2018-02-12 RX ADMIN — ENOXAPARIN SODIUM 40 MG: 40 INJECTION SUBCUTANEOUS at 14:37

## 2018-02-12 RX ADMIN — IPRATROPIUM BROMIDE 0.5 MG: 0.5 SOLUTION RESPIRATORY (INHALATION) at 21:47

## 2018-02-12 RX ADMIN — LEVALBUTEROL 1.25 MG: 1.25 SOLUTION, CONCENTRATE RESPIRATORY (INHALATION) at 21:47

## 2018-02-12 RX ADMIN — LEVALBUTEROL 1.25 MG: 1.25 SOLUTION, CONCENTRATE RESPIRATORY (INHALATION) at 08:40

## 2018-02-12 RX ADMIN — THEOPHYLLINE ANHYDROUS 400 MG: 200 CAPSULE, EXTENDED RELEASE ORAL at 08:55

## 2018-02-12 RX ADMIN — ACETAMINOPHEN 325 MG: 325 TABLET ORAL at 12:40

## 2018-02-12 RX ADMIN — ALPRAZOLAM 0.25 MG: 0.25 TABLET ORAL at 04:15

## 2018-02-12 RX ADMIN — METHYLPREDNISOLONE SODIUM SUCCINATE 40 MG: 40 INJECTION, POWDER, FOR SOLUTION INTRAMUSCULAR; INTRAVENOUS at 08:55

## 2018-02-12 RX ADMIN — ALPRAZOLAM 0.25 MG: 0.25 TABLET ORAL at 14:37

## 2018-02-12 RX ADMIN — LEVALBUTEROL 1.25 MG: 1.25 SOLUTION, CONCENTRATE RESPIRATORY (INHALATION) at 14:19

## 2018-02-12 RX ADMIN — METHYLPREDNISOLONE SODIUM SUCCINATE 40 MG: 40 INJECTION, POWDER, FOR SOLUTION INTRAMUSCULAR; INTRAVENOUS at 20:01

## 2018-02-12 RX ADMIN — ALPRAZOLAM 0.25 MG: 0.25 TABLET ORAL at 20:01

## 2018-02-12 RX ADMIN — ALPRAZOLAM 0.25 MG: 0.25 TABLET ORAL at 08:55

## 2018-02-12 RX ADMIN — IPRATROPIUM BROMIDE 0.5 MG: 0.5 SOLUTION RESPIRATORY (INHALATION) at 14:19

## 2018-02-12 RX ADMIN — DOCUSATE SODIUM 100 MG: 100 CAPSULE, LIQUID FILLED ORAL at 20:01

## 2018-02-12 RX ADMIN — ACETAMINOPHEN 650 MG: 325 TABLET ORAL at 17:30

## 2018-02-12 NOTE — INTERDISCIPLINARY/THERAPY
02/12/18 1049   OT Last Visit   OT Received On 02/12/18   Precautions   Reinforced Precautions Yes   Other Precautions Monitor O2/HR   Subjective Comments   Subjective Comments RN ok'd tx. Pt agreeable   General   Chart Reviewed Yes   OT Treatment Duration (Min) 14 Minutes   Is this a Co-Treatment? Yes  (PT)   Family/Caregiver Present No   Pain Assessment   Pain Assessment No/denies pain   Bed Mobility   Bed Mobility Not assessed   Transfers   Transfer Assessed   Transfer 1   Transfer From 1 Sit   Transfer Type 1 To and from   Transfer to 1 Stand   Technique 1 Stand to sit;Sit to stand   Transfer Device 1 Transfer belt   Level of Assistance 1 Standby assistance   Trials/Comments 1 two stands, inital stand for repositioning, second stand for a total of 30 seconds.    Activity Tolerance   Activity Tolerance Comments Very poor activity tolerance. Limited by total body strength and activity tolerance.    RUE Assessment   RUE Assessment WFL   LUE Assessment   LUE Assessment WFL   Additional Activities   Additional Activities Comments Vitals WNL. Pt on high flow O2 50 LPM.    Cognition   Arousal/Alertness WFL   Assessment   Rehab Potential Poor   Problem List Decreased ADL status;Decreased upper extremity strength;Decreased safe judgment during ADL;Decreased endurance;Decreased functional mobility;Decreased gross motor control;Decreased IADLs;Decreased trunk control for functional activities   Recommendations for Therapy Continue skilled therapy;Unable to tolerate 3 hours therapy;Safety issues - physical   Assessment Comment Pt demo'd increased abilities this date although on increased supplemental O2. Pt demo'd decreased anxiety this date. Increased fxl mobility although limited by cardiopulmonary capacity, activity toelrance, and strength.    Plan   Plan Comment SPT'sCORRIE HEP   OT - [place] App[place]16/18  [place] [place]

## 2018-02-12 NOTE — PROGRESS NOTES
Critical Care Daily Progress Note    Subjective      Patient had no acute events overnight, now on oxymizer off Bipap and doing well.     Objective       Vital signs:  Vitals:    02/12/18 1200 02/12/18 1314 02/12/18 1425 02/12/18 1426   BP: 108/85      BP Location: Left arm      Patient Position: Sitting      Pulse: 130      Resp: 12  (P) 19 (P) 24   Temp: 36.6 °C (97.9 °F)      TempSrc: Oral      SpO2: 91% 92%     Weight:       Height:             Physical Exam:    General appearance: alert, appears stated age and cooperative  Eyes: conjunctivae/corneas clear. PERRL, EOM's intact. Fundi benign.  Lungs: diminished breath sounds  Heart: regular rate and rhythm, S1, S2 normal, no murmur, click, rub or gallop  Abdomen: soft, non-tender; bowel sounds normal; no masses, no organomegaly  Extremities: extremities normal, warm and well-perfused; no cyanosis, clubbing, or edema  Skin: Skin color, texture, turgor normal. No rashes or lesions    Assessment/Plan       Active Problems:    COPD exacerbation (CMS/[facility])    COPD with acute exacerbation (CMS/[facility])       LOS: 4 days     COPD with acute exacerbation secondary to RSV infection  2/10/2018: Currently on noninvasive positive pressure ventilation.  May need to intubate at some point today.  We will continue to aggressively diurese.  ABG from this morning shows pH 7.3 4/63/84/33/95 percent  2/11/2018: Overall stable.  Continue to diurese.  High flow nasal cannula used today to give break from noninvasive positive pressure ventilation.  2/12/2018: Patient doing well on oxymizer, stable will transfer to floor     COPD, severe, FEV1 0.5 or 19% of predicted in June 2016  2/10/2018: Reviewing home medications which include according to a list Pulmicort, Advair, theophylline, Daliresp.  2/12/2018: continue prednisone and will transfer to floor and will contact pulmonology and outpt pulm Dr. Esteban Baker     Anxiety  2/10/2018: Patient has refused in the past any type of  medications for anxiety.  May benefit from low-dose Xanax or extended release morphine tablet for anxiety and shortness of breath.  2/11/2018: Xanax appears to work well, will add as needed dose tonight.  2/12/2018: continue xanax, patient anxiety well controlled at this point     Chronic respiratory acidosis      Protein calorie malnutrition, mild    AYANA VELA,

## 2018-02-12 NOTE — PLAN OF CARE
Problem: Respiratory - Adult  Goal: Achieves optimal ventilation and oxygenation with noninvasive CPAP/BiLEVEL support  INTERVENTIONS:  1. Provide education to patient/family about rationale and expected outcomes associated with therapy  2. Position patient to facilitate optimal ventilation/oxygenation status and minimize respiratory effort  3. Position patient to reduce aspiration risk, elevate head of bed at least 35 degrees or higher, as applicable  4. Assess effectiveness of therapy on ventilation/oxygenation status based on oxygen saturation and/or arterial blood gases, as indicated  5. Assess patient for changes in respiratory and physiological status  6. Auscultate breath sounds and assess chest excursion, as indicated  7. Assess patient for changes in mentation and behavior  8. Routinely monitor equipment for proper performance and settings  9. Assess and monitor skin condition, in relationship to the respiratory interface  10. Assure equipment alarm volume is adequate for the patient's environment  11. Immediately respond to equipment alarm, to assess patient and/or cause for alarm  12. Follow universal infection control/hospital policy(ies)/standards   Outcome: Progressing   02/12/18 0054   Interventions Appropriate for this Patient   Achieves Optimal Oxygenation with Noninvasive CPAP/BiLEVEL Support Position patient to facilitate optimal ventilation/oxygenation status and minimize respiratory effort;Provide education to patient/family about rationale and expected outcomes associated with therapy;Position patient to reduce aspiration risk, elevate head of bed at least 35 degrees or higher, as applicable;Assess effectiveness of therapy on ventilation/oxygenation status based on oxygen saturation and/or arterial blood gases, as indicated;Assess patient for changes in respiratory and physiological status;Auscultate breath sounds and assess chest excursion, as indicated;Assess patient for changes in mentation  and behavior;Routinely monitor equipment for proper performance and settings;Assess and monitor skin condition, in relationship to the respiratory interface;Assure equipment alarm volume is adequate for the patient's environment;Immediately repsond to equipment alarm, to assess patient and/or cause for alarm;Follow universal infection control/hospital policy(ies)/standards       Problem: Neurosensory - Adult  Goal: Achieves stable or improved neurological status  INTERVENTIONS  1. Assess for and report changes in neurological status  2. Initiate measures to prevent increased intracranial pressure  3. Maintain blood pressure and fluid volume within ordered parameters to optimize cerebral perfusion and minimize risk of hemorrhage  4. Monitor temperature, glucose, and sodium. Initiate appropriate interventions as ordered   Outcome: Progressing   02/12/18 0054   Interventions Appropriate for this Patient   Achieves stable or improved neurological status Assess for and report changes in neurological status;Initiate measures to prevent increased intracranial pressure;Maintain blood pressure and fluid volume within ordered parameters to optimize cerebral perfusion and minimize risk of hemorrhage;Monitor temperature, glucose, and sodium. Initiate appropriate interventions as ordered       Problem: Skin/Tissue Integrity - Adult  Goal: Incisions, wounds, or drain sites healing without S/S of infection  INTERVENTIONS  1. Assess and document risk factors for skin breakdown  2. Assess and document skin integrity  3. Assess and document dressing/incision, wound bed, drain sites and surrounding tissue  4. Implement wound care per orders   Outcome: Progressing   02/12/18 0054   Interventions Appropriate for this Patient   Incision(s), wound(s) or drain site(s) healing without S/S of infection Assess and document risk factors for skin breakdown;Assess and document skin integrity       Problem: Infection Control  Goal: MINIMIZE THE  ACQUISITION AND TRANSMISSION OF INFECTIOUS AGENTS  INTERVENTIONS:  1. Isolate patient with suspected/diagnosed communicable disease  2. Place on designated isolation precautions  3. Maintain isolation techniques  4. Perform hand hygiene before and after each patient care activity  5. [place] universal precautions  6. Wear PPE as directed for type of isolation  7. Administer antibiotic therapy as ordered  8. Clean the environment appropriately after each patient use  9. Clean patient care equipment after each patient use as it leaves the room  10. Limit number of visitors, as appropriate   Outcome: Progressing   02/12/18 0054   Interventions Appropriate for this Patient   MINIMIZE THE ACQUISITION AND TRANSMISSION OF INFECT AGENTS  Isolate patient with suspected/diagnosed communicable disease;Place on designated isolation precautions;[place] universal precautions;Maintain isolation techniques;Wear PPE as directed for type of isolation;Administer antibiotic therapy as ordered;Perform hand hygiene before and after each patient care activity;Clean the environment appropriately after each patient use;Limit number of visitors, as appropriate;Clean patient care equipment after each patient use as it leaves the room;Educate the patient/visitors on hand hygiene technique and need to complete upon entering/exiting the patient's room;Educate the patient/visitors on how to avoid the spread of infection       Problem: Knowledge Deficit  Goal: Patient/family/caregiver demonstrates understanding of disease process, treatment plan, medications, and discharge instructions  INTERVENTIONS:   1. Complete learning assessment and assess knowledge base  2. Provide teaching at level of understanding   3. Provide teaching via preferred learning methods   Outcome: Progressing   02/12/18 0054   Interventions Appropriate for this Patient   Patient/family/caregiver demonstrates understanding of disease process, treatment plan, medications,  and discharge instructions Complete learning assessment and assess knowledge base;Provide teaching at level of understanding;Provide teaching via preferred learning methods       Problem: Potential for Compromised Skin Integrity  Goal: Skin Integrity is Maintained or Improved  INTERVENTIONS:  1. Assess and monitor skin integrity  2. Collaborate with interdisciplinary team and initiate plans and interventions as needed  3. Alternate a full bath with partial baths for elderly   4. Monitor patient's hygiene practices   5. Collaborate with wound, ostomy, and continence nurse   Outcome: Progressing   02/12/18 0054   Interventions Appropriate for this Patient   Skin integrity is maintained or improved Assess and monitor skin integrity;Collaborate with interdisciplinary team and initiate plans and interventions as needed;Monitor patient's hygiene practices     Goal: Nutritional status is improving  INTERVENTIONS:  1. Monitor and assess patient for malnutrition (ex- brittle hair, bruises, dry skin, pale skin and conjunctiva, muscle wasting, smooth red tongue, and disorientation)  2. Monitor patient's weight and dietary intake as ordered or per policy  3. Determine patient's food preferences and provide high-protein, high-caloric foods as appropriate  4. Assist patient with eating   5. Allow adequate time for meals   6. Encourage patient to take dietary supplement as ordered   7. Collaborate with dietitian  8. Include patient/family/caregiver in decisions related to nutrition   Outcome: Progressing   02/12/18 0054   Interventions Appropriate for this Patient   Nutritional status is improving Monitor and assess patient for malnutrition (ex- brittle hair, bruises, dry skin, pale skin and conjunctiva, muscle wasting, smooth red tongue, and disorientation);Monitor patient's weight and dietary intake as ordered or per policy;Determine patient's food preferences and provide high-protein, high-caloric foods as appropriate;Assist  patient with eating;Allow adequate time for meals;Encourage patient to take dietary supplement as ordered;Collaborate with dietitian;Include patient/family/caregiver in decisions related to nutrition     Goal: MOBILITY IS MAINTAINED OR IMPROVED  INTERVENTIONS  1. Collaborate with interdisciplinary team and initiate plan and interventions as ordered (PT/OT)  2. Encourage ambulation  3. Up to chair for meals  4. Monitor for signs of deconditioning   Outcome: Progressing   02/12/18 0054   Interventions Appropriate for this Patient   Mobility is Maintained or Improved Collaborate with interdisciplinary team and initiate plan and interventions as ordered (PT/OT);Encourage ambulation;Up to chair for meals;Monitor for signs of deconditioning       Problem: Urinary Incontinence  Goal: Perineal skin integrity is maintained or improved  INTERVENTIONS:  1. Assess genitourinary system, perineal skin, labs (urinalysis), and history of incontinence to include past management, aggravating, and alleviating factors  2. Collaborate with interdisciplinary team including wound, ostomy, and continence nurse and initiate plans and interventions as needed  4. Consider urine containment device  5. Apply skin protectant   6. Develop skin care regimen  7. Provide privacy when changing patient's incontinence device to maintain their dignity   Outcome: Progressing   02/12/18 0054   Interventions Appropriate for this Patient   Perineal skin integrity is maintained or improved Assess genitourinary system, perineal skin, labs (urinalysis), and history of incontinence to include past management, aggravating, and alleviating factors;Collaborate with interdisciplinary team including wound, ostomy, and continence nurse and initiate plans and interventions as needed;Develop skin care regimen;Provide privacy when changing patient's incontinence device to maintain their dignity       Problem: Safety Adult - Fall  Goal: Free from fall  injury  INTERVENTIONS:    Please reference Cares/Safety Flowsheet under [place] Fall Risk for interventions.   Outcome: Progressing

## 2018-02-12 NOTE — INTERDISCIPLINARY/THERAPY
02/12/18 1048   PT Last Visit   PT Received On 02/12/18   General   PT Treatment Duration (Min) 15 Minutes   Is this a Co-Treatment? Yes   Additional Pertinent History RSV, W/C bound PLOF with SPT only   Precautions   Other Precautions Monitor Vitals closely, unstable HR/O2 sats   Subjective Comments   Subjective Comments NSG perimitted therapy today. Pt agrees and is in the recliiner upon arrival. Pt remained in chair at end of sesssion. No Alarms present   Pain Assessment   Pain Assessment 0-10   Cognition   Overall Cognitive Status WFL   Balance   Balance Intact   Static Sitting Balance   Static Sitting-Level of Assistance Standby assistance   Transfer 1   Transfer From 1 Sit   Transfer Type 1 To and from   Transfer to 1 Stand   Transfer Device 1 Transfer belt   Level of Assistance 1 Standby assistance   Trials/Comments 1 one stand. Then vitals became unstable   Assessment   Rehab Potential Fair   Progress Slow progress, medical status limitations   Problem List Decreased strength;Impaired balance;Decreased mobility   Assessment Comment Pt does not seem to have lost much mm strength, but vitals remain unstable with mvmt. Pt also c/o lack of feeling in bialt LE's, hence why she uses a wheelchair at home most of the time   Plan   Treatment Interventions Functional transfer training;Endurance training;Therapeutic exercises   PT Frequency 3-5x/wk   PT - OK to Discharge No   Plan Comment Assess safety with transfers, LE HEP, functional balance with transfers

## 2018-02-12 NOTE — NURSING END OF SHIFT
Nursing End of Shift Summary    Goals:  Clinical Goals for the Shift: improve resp status    Narrative Summary of Progress Towards Clinical Goals:  Up in chair all shift. Changed to [place].     Barriers for Transfer or Discharge: yes  Increased 02 needs

## 2018-02-13 LAB
ALBUMIN SERPL-MCNC: 3.6 G/DL (ref 3.5–5.3)
ANION GAP SERPL CALC-SCNC: 9 MMOL/L (ref 3–11)
BASOPHILS # BLD AUTO: 0 10*3/UL
BASOPHILS NFR BLD AUTO: 0 % (ref 0–2)
BUN SERPL-MCNC: 15 MG/DL (ref 7–25)
BUN/CREAT SERPL: 21.43 RATIO
CALCIUM ALBUM COR SERPL-MCNC: 10.1 MG/DL (ref 8.5–10.1)
CALCIUM SERPL-MCNC: 9.8 MG/DL (ref 8.6–10.3)
CHLORIDE SERPL-SCNC: 100 MMOL/L (ref 98–107)
CO2 SERPL-SCNC: 33 MMOL/L (ref 21–32)
CREAT SERPL-MCNC: 0.7 MG/DL (ref 0.6–1.2)
EOSINOPHIL # BLD AUTO: 0 10*3/UL
EOSINOPHIL NFR BLD AUTO: 0 % (ref 0–3)
ERYTHROCYTE [DISTWIDTH] IN BLOOD BY AUTOMATED COUNT: 17 % (ref 11.5–14)
GFR SERPL CREATININE-BSD FRML MDRD: 87 ML/MIN/1.73M*2
GLUCOSE SERPL-MCNC: 161 MG/DL (ref 70–105)
HCT VFR BLD AUTO: 40.4 % (ref 34–45)
HGB BLD-MCNC: 12.9 G/DL (ref 11.5–15.5)
HYPOCHROMIA PRESENCE IN BLOOD, ANALYZER: ABNORMAL
LYMPHOCYTES # BLD AUTO: 1.6 10*3/UL
LYMPHOCYTES NFR BLD AUTO: 10 % (ref 11–47)
MAGNESIUM SERPL-MCNC: 2.1 MG/DL (ref 1.8–2.4)
MCH RBC QN AUTO: 26.4 PG (ref 28–33)
MCHC RBC AUTO-ENTMCNC: 32 G/DL (ref 32–36)
MCV RBC AUTO: 82.7 FL (ref 81–97)
MONOCYTES # BLD AUTO: 0.7 10*3/UL
MONOCYTES NFR BLD AUTO: 4 % (ref 3–11)
NEUTROPHILS # BLD AUTO: 14 10*3/UL
NEUTROPHILS NFR BLD AUTO: 86 % (ref 41–81)
PHOSPHATE SERPL-MCNC: 3.1 MG/DL (ref 2.5–4.9)
PLATELET # BLD AUTO: 292 10*3/UL (ref 140–350)
PMV BLD AUTO: 7 FL (ref 6.9–10.8)
POTASSIUM SERPL-SCNC: 4.1 MMOL/L (ref 3.5–5.1)
RBC # BLD AUTO: 4.88 10*6/ΜL (ref 3.7–5.3)
SODIUM SERPL-SCNC: 142 MMOL/L (ref 135–145)
WBC # BLD AUTO: 16.3 10*3/UL (ref 4.5–10.5)

## 2018-02-13 PROCEDURE — 2590000100 HC RX 259: Performed by: INTERNAL MEDICINE

## 2018-02-13 PROCEDURE — 99291 CRITICAL CARE FIRST HOUR: CPT | Performed by: STUDENT IN AN ORGANIZED HEALTH CARE EDUCATION/TRAINING PROGRAM

## 2018-02-13 PROCEDURE — 6370000100 HC RX 637 (ALT 250 FOR IP): Performed by: INTERNAL MEDICINE

## 2018-02-13 PROCEDURE — 6370000100 HC RX 637 (ALT 250 FOR IP): Performed by: STUDENT IN AN ORGANIZED HEALTH CARE EDUCATION/TRAINING PROGRAM

## 2018-02-13 PROCEDURE — 36415 COLL VENOUS BLD VENIPUNCTURE: CPT | Performed by: INTERNAL MEDICINE

## 2018-02-13 PROCEDURE — 94640 AIRWAY INHALATION TREATMENT: CPT

## 2018-02-13 PROCEDURE — 97530 THERAPEUTIC ACTIVITIES: CPT

## 2018-02-13 PROCEDURE — (BLANK) HC ROOM ICU INTERMEDIATE

## 2018-02-13 PROCEDURE — 97530 THERAPEUTIC ACTIVITIES: CPT | Mod: GP

## 2018-02-13 PROCEDURE — 6360000200 HC RX 636 W HCPCS (ALT 250 FOR IP): Performed by: INTERNAL MEDICINE

## 2018-02-13 PROCEDURE — 85025 COMPLETE CBC W/AUTO DIFF WBC: CPT | Performed by: INTERNAL MEDICINE

## 2018-02-13 PROCEDURE — 80069 RENAL FUNCTION PANEL: CPT | Performed by: INTERNAL MEDICINE

## 2018-02-13 PROCEDURE — 83735 ASSAY OF MAGNESIUM: CPT | Performed by: INTERNAL MEDICINE

## 2018-02-13 PROCEDURE — 97110 THERAPEUTIC EXERCISES: CPT

## 2018-02-13 RX ORDER — ACETAMINOPHEN 325 MG/1
325-650 TABLET ORAL EVERY 6 HOURS PRN
Status: DISCONTINUED | OUTPATIENT
Start: 2018-02-13 | End: 2018-02-17 | Stop reason: HOSPADM

## 2018-02-13 RX ORDER — NYSTATIN 100000 [USP'U]/ML
400000 SUSPENSION ORAL 4 TIMES DAILY
Status: DISCONTINUED | OUTPATIENT
Start: 2018-02-13 | End: 2018-02-17 | Stop reason: HOSPADM

## 2018-02-13 RX ADMIN — IPRATROPIUM BROMIDE 0.5 MG: 0.5 SOLUTION RESPIRATORY (INHALATION) at 13:33

## 2018-02-13 RX ADMIN — METHYLPREDNISOLONE SODIUM SUCCINATE 40 MG: 40 INJECTION, POWDER, FOR SOLUTION INTRAMUSCULAR; INTRAVENOUS at 08:17

## 2018-02-13 RX ADMIN — ACETAMINOPHEN 650 MG: 325 TABLET ORAL at 19:55

## 2018-02-13 RX ADMIN — ACETAMINOPHEN 650 MG: 325 TABLET ORAL at 14:03

## 2018-02-13 RX ADMIN — LEVALBUTEROL 1.25 MG: 1.25 SOLUTION, CONCENTRATE RESPIRATORY (INHALATION) at 07:56

## 2018-02-13 RX ADMIN — ALPRAZOLAM 0.25 MG: 0.25 TABLET ORAL at 19:51

## 2018-02-13 RX ADMIN — IPRATROPIUM BROMIDE 0.5 MG: 0.5 SOLUTION RESPIRATORY (INHALATION) at 07:56

## 2018-02-13 RX ADMIN — ENOXAPARIN SODIUM 40 MG: 40 INJECTION SUBCUTANEOUS at 14:04

## 2018-02-13 RX ADMIN — LEVALBUTEROL 1.25 MG: 1.25 SOLUTION, CONCENTRATE RESPIRATORY (INHALATION) at 20:27

## 2018-02-13 RX ADMIN — ACETAMINOPHEN 650 MG: 325 TABLET ORAL at 01:14

## 2018-02-13 RX ADMIN — DOCUSATE SODIUM 100 MG: 100 CAPSULE, LIQUID FILLED ORAL at 19:51

## 2018-02-13 RX ADMIN — THEOPHYLLINE ANHYDROUS 400 MG: 200 CAPSULE, EXTENDED RELEASE ORAL at 08:17

## 2018-02-13 RX ADMIN — NYSTATIN 400000 UNITS: 100000 SUSPENSION ORAL at 19:52

## 2018-02-13 RX ADMIN — ALPRAZOLAM 0.25 MG: 0.25 TABLET ORAL at 14:03

## 2018-02-13 RX ADMIN — METHYLPREDNISOLONE SODIUM SUCCINATE 40 MG: 40 INJECTION, POWDER, FOR SOLUTION INTRAMUSCULAR; INTRAVENOUS at 19:51

## 2018-02-13 RX ADMIN — ALPRAZOLAM 0.25 MG: 0.25 TABLET ORAL at 08:18

## 2018-02-13 RX ADMIN — DOCUSATE SODIUM 100 MG: 100 CAPSULE, LIQUID FILLED ORAL at 08:17

## 2018-02-13 RX ADMIN — ACETAMINOPHEN 650 MG: 325 TABLET ORAL at 08:17

## 2018-02-13 RX ADMIN — ESOMEPRAZOLE MAGNESIUM 40 MG: 40 CAPSULE, DELAYED RELEASE ORAL at 15:55

## 2018-02-13 RX ADMIN — NYSTATIN 400000 UNITS: 100000 SUSPENSION ORAL at 16:00

## 2018-02-13 RX ADMIN — IPRATROPIUM BROMIDE 0.5 MG: 0.5 SOLUTION RESPIRATORY (INHALATION) at 20:26

## 2018-02-13 RX ADMIN — ROFLUMILAST 500 MCG: 500 TABLET ORAL at 11:51

## 2018-02-13 RX ADMIN — LEVALBUTEROL 1.25 MG: 1.25 SOLUTION, CONCENTRATE RESPIRATORY (INHALATION) at 13:33

## 2018-02-13 NOTE — PLAN OF CARE
Problem: Respiratory - Adult  Goal: Achieves optimal ventilation and oxygenation  INTERVENTIONS:  1. Assess for changes in respiratory status  2. Assess for changes in mentation and behavior  3. Position to facilitate oxygenation and minimize respiratory effort  4. Oxygen supplementation based on oxygen saturation or ABGs  5. Assess patient's ability to cough effectively  6. Encourage broncho-pulmonary hygiene including cough, deep breathe  7. Assess the need for suctioning   8. Assess and instruct to report SOB or any respiratory difficulty  9. Respiratory Therapy support as indicated, including medications and treatment.   Outcome: Progressing   02/13/18 1241   Interventions Appropriate for this Patient   Achieves optimal ventilation and oxygenation Assess for changes in respiratory status;Assess for changes in mentation and behavior;Position to facilitate oxygenation and minimize respiratory effort;Oxygen supplementation based on oxygen saturation or arterial blood gases;Assess patient's ability to cough effectively;Encourage broncho-pulmonary hygiene including cough, deep breathe;Assess the need for suctioning;Assess and instruct to report shortness of breath or any respiratory difficulty;Respiratory Therapy support as indicated, including medications and treatment       Problem: Neurosensory - Adult  Goal: Achieves stable or improved neurological status  INTERVENTIONS  1. Assess for and report changes in neurological status  2. Initiate measures to prevent increased intracranial pressure  3. Maintain blood pressure and fluid volume within ordered parameters to optimize cerebral perfusion and minimize risk of hemorrhage  4. Monitor temperature, glucose, and sodium. Initiate appropriate interventions as ordered   Outcome: Progressing   02/13/18 1241   Interventions Appropriate for this Patient   Achieves stable or improved neurological status Assess for and report changes in neurological status;Maintain blood  pressure and fluid volume within ordered parameters to optimize cerebral perfusion and minimize risk of hemorrhage;Monitor temperature, glucose, and sodium. Initiate appropriate interventions as ordered       Problem: Skin/Tissue Integrity - Adult  Goal: Incisions, wounds, or drain sites healing without S/S of infection  INTERVENTIONS  1. Assess and document risk factors for skin breakdown  2. Assess and document skin integrity  3. Assess and document dressing/incision, wound bed, drain sites and surrounding tissue  4. Implement wound care per orders   Outcome: Progressing   02/13/18 1241   Interventions Appropriate for this Patient   Incision(s), wound(s) or drain site(s) healing without S/S of infection Assess and document risk factors for skin breakdown;Assess and document skin integrity;Assess and document dressing/incision, wound bed, drain sites and surrounding tissue;Implement wound care per orders       Problem: Infection Control  Goal: MINIMIZE THE ACQUISITION AND TRANSMISSION OF INFECTIOUS AGENTS  INTERVENTIONS:  1. Isolate patient with suspected/diagnosed communicable disease  2. Place on designated isolation precautions  3. Maintain isolation techniques  4. Perform hand hygiene before and after each patient care activity  5. [place] universal precautions  6. Wear PPE as directed for type of isolation  7. Administer antibiotic therapy as ordered  8. Clean the environment appropriately after each patient use  9. Clean patient care equipment after each patient use as it leaves the room  10. Limit number of visitors, as appropriate   Outcome: Progressing   02/13/18 1241   Interventions Appropriate for this Patient   MINIMIZE THE ACQUISITION AND TRANSMISSION OF INFECT AGENTS  Isolate patient with suspected/diagnosed communicable disease;Place on designated isolation precautions;Maintain isolation techniques;Perform hand hygiene before and after each patient care activity;[place] universal precautions;Wear  PPE as directed for type of isolation;Clean the environment appropriately after each patient use;Clean patient care equipment after each patient use as it leaves the room;Limit number of visitors, as appropriate;Educate the patient/visitors on hand hygiene technique and need to complete upon entering/exiting the patient's room;Educate the patient/visitors on how to avoid the spread of infection       Problem: Knowledge Deficit  Goal: Patient/family/caregiver demonstrates understanding of disease process, treatment plan, medications, and discharge instructions  INTERVENTIONS:   1. Complete learning assessment and assess knowledge base  2. Provide teaching at level of understanding   3. Provide teaching via preferred learning methods   Outcome: Progressing   02/13/18 1241   Interventions Appropriate for this Patient   Patient/family/caregiver demonstrates understanding of disease process, treatment plan, medications, and discharge instructions Complete learning assessment and assess knowledge base;Provide teaching at level of understanding;Provide teaching via preferred learning methods       Problem: Potential for Compromised Skin Integrity  Goal: Skin Integrity is Maintained or Improved  INTERVENTIONS:  1. Assess and monitor skin integrity  2. Collaborate with interdisciplinary team and initiate plans and interventions as needed  3. Alternate a full bath with partial baths for elderly   4. Monitor patient's hygiene practices   5. Collaborate with wound, ostomy, and continence nurse   Outcome: Progressing   02/13/18 1241   Interventions Appropriate for this Patient   Skin integrity is maintained or improved Assess and monitor skin integrity;Collaborate with interdisciplinary team and initiate plans and interventions as needed;Alternate a full bath with partial baths for elderly;Monitor patient's hygiene practices     Goal: Nutritional status is improving  INTERVENTIONS:  1. Monitor and assess patient for malnutrition  (ex- brittle hair, bruises, dry skin, pale skin and conjunctiva, muscle wasting, smooth red tongue, and disorientation)  2. Monitor patient's weight and dietary intake as ordered or per policy  3. Determine patient's food preferences and provide high-protein, high-caloric foods as appropriate  4. Assist patient with eating   5. Allow adequate time for meals   6. Encourage patient to take dietary supplement as ordered   7. Collaborate with dietitian  8. Include patient/family/caregiver in decisions related to nutrition   Outcome: Progressing   02/13/18 1241   Interventions Appropriate for this Patient   Nutritional status is improving Monitor and assess patient for malnutrition (ex- brittle hair, bruises, dry skin, pale skin and conjunctiva, muscle wasting, smooth red tongue, and disorientation);Monitor patient's weight and dietary intake as ordered or per policy;Assist patient with eating;Allow adequate time for meals;Encourage patient to take dietary supplement as ordered;Include patient/family/caregiver in decisions related to nutrition;Determine patient's food preferences and provide high-protein, high-caloric foods as appropriate;Collaborate with dietitian

## 2018-02-13 NOTE — INTERDISCIPLINARY/THERAPY
02/13/18 0903   PT Last Visit   PT Received On 02/13/18   General   PT Treatment Duration (Min) 18 Minutes   Is this a Co-Treatment? Yes   Additional Pertinent History W/C bound previous and will likely be until possible lung transplant   Precautions   Other Precautions High O2 needs, poor act. tolerance   Subjective Comments   Subjective Comments NSG permitted therapy today. Pt agrees and is in the recliner upon arrival. No alarms present. Pt remained in chair at end of session with call light in reach.   Pain Assessment   Pain Assessment 0-10   Pain Score 8   Pain Type Chronic pain   Pain Location Knee   Pain Orientation Right;Left   Pain Descriptors Aching   Pain Frequency Constant/continuous   Pain Onset Ongoing   Clinical Progression Not changed   Cognition   Overall Cognitive Status WFL   Balance   Balance Intact   Transfer 1   Transfer From 1 Sit   Transfer Type 1 To and from   Transfer to 1 Stand   Transfer Device 1 Transfer belt   Level of Assistance 1 Standby assistance   Transfers 2   Transfer From 2 Chair with arms   Transfer Type 2 To and from   Transfer to 2 Bed   Transfer Device 1 Transfer belt   Level of Assistance 2 Standby assistance   Ambulation 1   Surface 1 Level surface   Device 1 No device   Assistance 1 Standby assistance   Quality of Gait 1 good steps with good balance.   Comments/Distance 1 5m x2   Assessment   Rehab Potential Fair   Progress Improving as expected   Problem List Decreased endurance   Problem List Comments Leading barrier to increased indep is pt's Hx of breathing problems.   Barriers to Discharge Other (Comment)  (High O2 needs)   Assessment Comment Pt physically is stong and has shown good balance with activities. However, pt just gets fatigued and out of breath so quickly. Minimal progress is likely to be made until pt has better activity tolerance.   Plan   Treatment Interventions Gait training;Stair training;Balance training;Endurance training;Therapeutic exercises    PT Frequency 3-5x/wk   Plan Comment Check bed mob, balance, ther ex for act. vi

## 2018-02-13 NOTE — PLAN OF CARE
Problem: Respiratory - Adult  Goal: Achieves optimal ventilation and oxygenation with noninvasive CPAP/BiLEVEL support  INTERVENTIONS:  1. Provide education to patient/family about rationale and expected outcomes associated with therapy  2. Position patient to facilitate optimal ventilation/oxygenation status and minimize respiratory effort  3. Position patient to reduce aspiration risk, elevate head of bed at least 35 degrees or higher, as applicable  4. Assess effectiveness of therapy on ventilation/oxygenation status based on oxygen saturation and/or arterial blood gases, as indicated  5. Assess patient for changes in respiratory and physiological status  6. Auscultate breath sounds and assess chest excursion, as indicated  7. Assess patient for changes in mentation and behavior  8. Routinely monitor equipment for proper performance and settings  9. Assess and monitor skin condition, in relationship to the respiratory interface  10. Assure equipment alarm volume is adequate for the patient's environment  11. Immediately respond to equipment alarm, to assess patient and/or cause for alarm  12. Follow universal infection control/hospital policy(ies)/standards   Outcome: Completed Date Met: 02/12/18 02/12/18 0054   Interventions Appropriate for this Patient   Achieves Optimal Oxygenation with Noninvasive CPAP/BiLEVEL Support Position patient to facilitate optimal ventilation/oxygenation status and minimize respiratory effort;Provide education to patient/family about rationale and expected outcomes associated with therapy;Position patient to reduce aspiration risk, elevate head of bed at least 35 degrees or higher, as applicable;Assess effectiveness of therapy on ventilation/oxygenation status based on oxygen saturation and/or arterial blood gases, as indicated;Assess patient for changes in respiratory and physiological status;Auscultate breath sounds and assess chest excursion, as indicated;Assess patient for  changes in mentation and behavior;Routinely monitor equipment for proper performance and settings;Assess and monitor skin condition, in relationship to the respiratory interface;Assure equipment alarm volume is adequate for the patient's environment;Immediately repsond to equipment alarm, to assess patient and/or cause for alarm;Follow universal infection control/hospital policy(ies)/standards     Goal: Achieves optimal ventilation and oxygenation  INTERVENTIONS:  1. Assess for changes in respiratory status  2. Assess for changes in mentation and behavior  3. Position to facilitate oxygenation and minimize respiratory effort  4. Oxygen supplementation based on oxygen saturation or ABGs  5. Assess patient's ability to cough effectively  6. Encourage broncho-pulmonary hygiene including cough, deep breathe  7. Assess the need for suctioning   8. Assess and instruct to report SOB or any respiratory difficulty  9. Respiratory Therapy support as indicated, including medications and treatment.  Outcome: Progressing   02/12/18 2110   Interventions Appropriate for this Patient   Achieves optimal ventilation and oxygenation Assess for changes in respiratory status;Position to facilitate oxygenation and minimize respiratory effort;Assess for changes in mentation and behavior;Oxygen supplementation based on oxygen saturation or arterial blood gases;Assess patient's ability to cough effectively;Encourage broncho-pulmonary hygiene including cough, deep breathe;Assess the need for suctioning;Assess and instruct to report shortness of breath or any respiratory difficulty;Respiratory Therapy support as indicated, including medications and treatment       Problem: Neurosensory - Adult  Goal: Achieves stable or improved neurological status  INTERVENTIONS  1. Assess for and report changes in neurological status  2. Initiate measures to prevent increased intracranial pressure  3. Maintain blood pressure and fluid volume within ordered  parameters to optimize cerebral perfusion and minimize risk of hemorrhage  4. Monitor temperature, glucose, and sodium. Initiate appropriate interventions as ordered   Outcome: Progressing   02/12/18 2110   Interventions Appropriate for this Patient   Achieves stable or improved neurological status Assess for and report changes in neurological status;Initiate measures to prevent increased intracranial pressure;Monitor temperature, glucose, and sodium. Initiate appropriate interventions as ordered       Problem: Skin/Tissue Integrity - Adult  Goal: Incisions, wounds, or drain sites healing without S/S of infection  INTERVENTIONS  1. Assess and document risk factors for skin breakdown  2. Assess and document skin integrity  3. Assess and document dressing/incision, wound bed, drain sites and surrounding tissue  4. Implement wound care per orders   Outcome: Progressing   02/12/18 2110   Interventions Appropriate for this Patient   Incision(s), wound(s) or drain site(s) healing without S/S of infection Assess and document risk factors for skin breakdown;Assess and document skin integrity;Assess and document dressing/incision, wound bed, drain sites and surrounding tissue;Implement wound care per orders       Problem: Infection Control  Goal: MINIMIZE THE ACQUISITION AND TRANSMISSION OF INFECTIOUS AGENTS  INTERVENTIONS:  1. Isolate patient with suspected/diagnosed communicable disease  2. Place on designated isolation precautions  3. Maintain isolation techniques  4. Perform hand hygiene before and after each patient care activity  5. [place] universal precautions  6. Wear PPE as directed for type of isolation  7. Administer antibiotic therapy as ordered  8. Clean the environment appropriately after each patient use  9. Clean patient care equipment after each patient use as it leaves the room  10. Limit number of visitors, as appropriate   Outcome: Progressing   02/12/18 2110   Interventions Appropriate for this  Patient   MINIMIZE THE ACQUISITION AND TRANSMISSION OF INFECT AGENTS  Isolate patient with suspected/diagnosed communicable disease;[place] universal precautions;Maintain isolation techniques;Perform hand hygiene before and after each patient care activity;Wear PPE as directed for type of isolation;Clean the environment appropriately after each patient use;Administer antibiotic therapy as ordered;Clean patient care equipment after each patient use as it leaves the room;Educate the patient/visitors on hand hygiene technique and need to complete upon entering/exiting the patient's room;Limit number of visitors, as appropriate;Educate the patient/visitors on how to avoid the spread of infection       Problem: Knowledge Deficit  Goal: Patient/family/caregiver demonstrates understanding of disease process, treatment plan, medications, and discharge instructions  INTERVENTIONS:   1. Complete learning assessment and assess knowledge base  2. Provide teaching at level of understanding   3. Provide teaching via preferred learning methods   Outcome: Progressing   02/12/18 2110   Interventions Appropriate for this Patient   Patient/family/caregiver demonstrates understanding of disease process, treatment plan, medications, and discharge instructions Complete learning assessment and assess knowledge base;Provide teaching at level of understanding;Provide teaching via preferred learning methods       Problem: Potential for Compromised Skin Integrity  Goal: Skin Integrity is Maintained or Improved  INTERVENTIONS:  1. Assess and monitor skin integrity  2. Collaborate with interdisciplinary team and initiate plans and interventions as needed  3. Alternate a full bath with partial baths for elderly   4. Monitor patient's hygiene practices   5. Collaborate with wound, ostomy, and continence nurse   Outcome: Progressing   02/12/18 2110   Interventions Appropriate for this Patient   Skin integrity is maintained or improved Assess and  monitor skin integrity;Collaborate with interdisciplinary team and initiate plans and interventions as needed;Monitor patient's hygiene practices     Goal: Nutritional status is improving  INTERVENTIONS:  1. Monitor and assess patient for malnutrition (ex- brittle hair, bruises, dry skin, pale skin and conjunctiva, muscle wasting, smooth red tongue, and disorientation)  2. Monitor patient's weight and dietary intake as ordered or per policy  3. Determine patient's food preferences and provide high-protein, high-caloric foods as appropriate  4. Assist patient with eating   5. Allow adequate time for meals   6. Encourage patient to take dietary supplement as ordered   7. Collaborate with dietitian  8. Include patient/family/caregiver in decisions related to nutrition   Outcome: Progressing   02/12/18 2110   Interventions Appropriate for this Patient   Nutritional status is improving Monitor and assess patient for malnutrition (ex- brittle hair, bruises, dry skin, pale skin and conjunctiva, muscle wasting, smooth red tongue, and disorientation);Monitor patient's weight and dietary intake as ordered or per policy;Determine patient's food preferences and provide high-protein, high-caloric foods as appropriate;Assist patient with eating;Allow adequate time for meals;Encourage patient to take dietary supplement as ordered;Collaborate with dietitian;Include patient/family/caregiver in decisions related to nutrition     Goal: MOBILITY IS MAINTAINED OR IMPROVED  INTERVENTIONS  1. Collaborate with interdisciplinary team and initiate plan and interventions as ordered (PT/OT)  2. Encourage ambulation  3. Up to chair for meals  4. Monitor for signs of deconditioning   Outcome: Progressing   02/12/18 2110   Interventions Appropriate for this Patient   Mobility is Maintained or Improved Collaborate with interdisciplinary team and initiate plan and interventions as ordered (PT/OT);Encourage ambulation;Up to chair for meals;Monitor  for signs of deconditioning       Problem: Urinary Incontinence  Goal: Perineal skin integrity is maintained or improved  INTERVENTIONS:  1. Assess genitourinary system, perineal skin, labs (urinalysis), and history of incontinence to include past management, aggravating, and alleviating factors  2. Collaborate with interdisciplinary team including wound, ostomy, and continence nurse and initiate plans and interventions as needed  4. Consider urine containment device  5. Apply skin protectant   6. Develop skin care regimen  7. Provide privacy when changing patient's incontinence device to maintain their dignity   Outcome: Progressing   02/12/18 2110   Interventions Appropriate for this Patient   Perineal skin integrity is maintained or improved Assess genitourinary system, perineal skin, labs (urinalysis), and history of incontinence to include past management, aggravating, and alleviating factors;Collaborate with interdisciplinary team including wound, ostomy, and continence nurse and initiate plans and interventions as needed;Apply skin protectant;Develop skin care regimen;Provide privacy when changing patient's incontinence device to maintain their dignity       Problem: Safety Adult - Fall  Goal: Free from fall injury  INTERVENTIONS:    Please reference Cares/Safety Flowsheet under Guillaume Fall Risk for interventions.   Outcome: Progressing

## 2018-02-13 NOTE — PLAN OF CARE
Problem: Respiratory - Adult  Goal: Achieves optimal ventilation and oxygenation  INTERVENTIONS:  1. Assess for changes in respiratory status  2. Assess for changes in mentation and behavior  3. Position to facilitate oxygenation and minimize respiratory effort  4. Oxygen supplementation based on oxygen saturation or ABGs  5. Assess patient's ability to cough effectively  6. Encourage broncho-pulmonary hygiene including cough, deep breathe  7. Assess the need for suctioning   8. Assess and instruct to report SOB or any respiratory difficulty  9. Respiratory Therapy support as indicated, including medications and treatment.   Outcome: Progressing   02/12/18 2110   Interventions Appropriate for this Patient   Achieves optimal ventilation and oxygenation Assess for changes in respiratory status;Position to facilitate oxygenation and minimize respiratory effort;Assess for changes in mentation and behavior;Oxygen supplementation based on oxygen saturation or arterial blood gases;Assess patient's ability to cough effectively;Encourage broncho-pulmonary hygiene including cough, deep breathe;Assess the need for suctioning;Assess and instruct to report shortness of breath or any respiratory difficulty;Respiratory Therapy support as indicated, including medications and treatment

## 2018-02-13 NOTE — INTERDISCIPLINARY/THERAPY
"   02/13/18 0902   OT Last Visit   OT Received On 02/13/18   Precautions   Other Precautions Monitor O2 and HR. Fall   Subjective Comments   Subjective Comments RN ok'd tx. Pt agreeeable to OT/PT tx.    General   Chart Reviewed Yes   OT Treatment Duration (Min) 19 Minutes   Is this a Co-Treatment? Yes   Family/Caregiver Present No   Pain Assessment   Pain Assessment 0-10   Pain Score 8   Pain Type Chronic pain   Pain Location Knee   Pain Orientation Right;Left   Pain Descriptors Aching   Pain Frequency Constant/continuous   Pain Interventions Repositioned;Rest   Balance   Balance Intact   Dynamic Standing Balance   Dynamic Standing-Balance Surface Firm   Dynamic Standing-Balance Support No upper extremity supported   Dynamic Standing-Comments Pt ambulating 5 meters w/ SBA for lines, good dynamic balance. Pt requiring sitting rest break 5 minutes for recovery in order to ambulate back to recliner 5 meters.    Bed Mobility   Bed Mobility Not assessed   Transfer 1   Transfer From 1 Sit   Transfer Type 1 To and from   Transfer to 1 Stand   Technique 1 Sit to stand;Stand to sit   Transfer Device 1 Transfer belt   Level of Assistance 1 Standby assistance   Trials/Comments 1 SBA for lines, pt txfered sit<>stand multiple times w/SBA.    Transfers 2   Transfer From 2 Chair with arms   Transfer Type 2 To and from   Transfer to 2 Bed   Technique 2 Stand pivot   Transfer Device 1 Transfer belt   Level of Assistance 2 Standby assistance   Trials/Comments 2 Pt seated in recliner to end w/all needs met w/in reach .    Activity Tolerance   Activity Tolerance Comments Pt requiring 5\" or more for recovery post ambulation of 5 meters. Pt demo'd good balance w/ambulation although significantly limited by cardiopulmonary capacity. Pt is aware of her fxl limitations, and is aware of need for recovery.    Therapeutic Exercise   Strengthening Yes   Side Exercised - Strengthening Bilateral   Exercise Equipment Used Resistive bands " "  Bilateral Resistance Orange   Bilateral Comment Providing BUE HEP, reviewing with pt. Pt verbalized understanding.    Additional Activities   Additional Activities Comments Vitals WNL throughout. Pt on 10LPM O2 oximizer   Cognition   Arousal/Alertness WFL   Assessment   Rehab Potential Fair   Problem List Decreased ADL status;Decreased safe judgment during ADL;Decreased cognition;Decreased endurance;Decreased upper extremity strength;Decreased functional mobility;Decreased gross motor control;Decreased IADLs;Decreased trunk control for functional activities   Recommendations for Therapy Continue skilled therapy   Assessment Comment Pt moriah'd increased fxl mobility this date although required 5\" for recovery post 5 meters. Pt moriah's need for further skilled OT to increase fxl abilities.    Plan   Plan Comment SPT's, G+H   OT - Next Appointment 02/16/18  ([facility])     "

## 2018-02-13 NOTE — INTERDISCIPLINARY/THERAPY
Care Management 5-2011    Pt discussed in MDR. Now on 10L O2 per Oxymizer. PT/OT following. Pt would likely be a good LTAC candidate r/t her chronic lung condition and frequent admission for it.     Asked Michi from [facility] in [place] to look at pt's chart and help CM determine if pt meets LTAC criteria. Michi states that the pt does meet LTAC criteria; she is unsure if she has a [facility] bed available (must check w/[facility] for availability first per [facility]).    CM to discuss LTAC w/pt.

## 2018-02-13 NOTE — PROGRESS NOTES
Critical Care Daily Progress Note    Subjective      No acute recurrence.  Patient remains on 10 L via nasal Oxymizer.  Patient states she is otherwise doing well with no complaints today other than chronic pain from her arthritis.     Objective       Vital signs:  Vitals:    02/13/18 0500 02/13/18 0700 02/13/18 0800 02/13/18 1200   BP:   110/84 120/95   BP Location:   Left arm Left arm   Patient Position:   Sitting Sitting   Pulse:   130 130   Resp:  18 18 16   Temp:   36.9 °C (98.4 °F) 36.6 °C (97.9 °F)   TempSrc:   Oral Oral   SpO2:   90% 92%   Weight: 76.9 kg (169 lb 8.5 oz)      Height:             Physical Exam:    General appearance: alert, appears stated age and cooperative  Eyes: conjunctivae/corneas clear. PERRL, EOM's intact. Fundi benign.  Lungs: diminished breath sounds  Heart: regular rate and rhythm, S1, S2 normal, no murmur, click, rub or gallop  Abdomen: soft, non-tender; bowel sounds normal; no masses, no organomegaly  Extremities: extremities normal, warm and well-perfused; no cyanosis, clubbing, or edema  Skin: Skin color, texture, turgor normal. No rashes or lesions    Assessment/Plan       Active Problems:    COPD exacerbation (CMS/HCC)    COPD with acute exacerbation (CMS/HCC)       LOS: 5 days     COPD with acute exacerbation secondary to RSV infection  2/10/2018: Currently on noninvasive positive pressure ventilation.  May need to intubate at some point today.  We will continue to aggressively diurese.  ABG from this morning shows pH 7.3 4/63/84/33/95 percent  2/11/2018: Overall stable.  Continue to diurese.  High flow nasal cannula used today to give break from noninvasive positive pressure ventilation.  2/12/2018: Patient doing well on oxymizer, stable will transfer to floor  2/13/2018: Floor transfer pending, steroids to be weaned down    COPD, severe, FEV1 0.5 or 19% of predicted in June 2016  2/10/2018: Reviewing home medications which include according to a list Pulmicort, Advair,  theophylline, Daliresp.  2/12/2018: continue prednisone and will transfer to floor and will contact pulmonology and outpt pulm Dr. Esteban Baker  2/13/2018: Patient improving and returning to baseline however still on high flow nasal Oxymizer at 10 L we will start steroid wean     Anxiety  2/10/2018: Patient has refused in the past any type of medications for anxiety.  May benefit from low-dose Xanax or extended release morphine tablet for anxiety and shortness of breath.  2/11/2018: Xanax appears to work well, will add as needed dose tonight.  2/12/2018: continue xanax, patient anxiety well controlled at this point     Chronic respiratory acidosis      Protein calorie malnutrition, mild   LOS: 5 days     DVT Prophylaxis: subcutaneous Lovenox    GI Prophylaxis: proton pump inhibitor per orders    Other lines/tubes: Midline and one peripheral line    Code Status: Full Code    Anticipated date of discharge: 1-2 days but it would be a good idea to have social work talk with patient about potential placement options that she still does require high oxygen      AYANA VELA,

## 2018-02-14 PROBLEM — E66.811 OBESITY (BMI 30.0-34.9): Chronic | Status: ACTIVE | Noted: 2018-02-14

## 2018-02-14 PROBLEM — J21.0 ACUTE BRONCHIOLITIS DUE TO RESPIRATORY SYNCYTIAL VIRUS (RSV): Status: ACTIVE | Noted: 2018-02-14

## 2018-02-14 PROBLEM — J20.5 ACUTE BRONCHITIS DUE TO RESPIRATORY SYNCYTIAL VIRUS (RSV): Status: ACTIVE | Noted: 2018-02-14

## 2018-02-14 LAB
ALBUMIN SERPL-MCNC: 3.4 G/DL (ref 3.5–5.3)
ANION GAP SERPL CALC-SCNC: 6 MMOL/L (ref 3–11)
BUN SERPL-MCNC: 14 MG/DL (ref 7–25)
BUN/CREAT SERPL: 23.33 RATIO
CALCIUM ALBUM COR SERPL-MCNC: 10.2 MG/DL (ref 8.5–10.1)
CALCIUM ALBUM COR SERPL-MCNC: 2.3 MG/DL (ref 1.8–2.4)
CALCIUM SERPL-MCNC: 9.7 MG/DL (ref 8.6–10.3)
CHLORIDE SERPL-SCNC: 99 MMOL/L (ref 98–107)
CO2 SERPL-SCNC: 37 MMOL/L (ref 21–32)
CREAT SERPL-MCNC: 0.6 MG/DL (ref 0.6–1.2)
ERYTHROCYTE [DISTWIDTH] IN BLOOD BY AUTOMATED COUNT: 16.9 % (ref 11.5–14)
GFR SERPL CREATININE-BSD FRML MDRD: 104 ML/MIN/1.73M*2
GLUCOSE SERPL-MCNC: 127 MG/DL (ref 70–105)
HCT VFR BLD AUTO: 37.8 % (ref 34–45)
HGB BLD-MCNC: 12.4 G/DL (ref 11.5–15.5)
L PNEUMO AG UR QL: NEGATIVE
LACTATE BLDV-SCNC: 1.48 MMOL/L (ref 0.5–1.99)
MAGNESIUM SERPL-MCNC: 2.2 MG/DL (ref 1.8–2.4)
MCH RBC QN AUTO: 27.2 PG (ref 28–33)
MCHC RBC AUTO-ENTMCNC: 32.8 G/DL (ref 32–36)
MCV RBC AUTO: 83.1 FL (ref 81–97)
PHOSPHATE SERPL-MCNC: 3.8 MG/DL (ref 2.5–4.9)
PLATELET # BLD AUTO: 239 10*3/UL (ref 140–350)
PMV BLD AUTO: 6.3 FL (ref 6.9–10.8)
POTASSIUM SERPL-SCNC: 4.2 MMOL/L (ref 3.5–5.1)
RBC # BLD AUTO: 4.55 10*6/ΜL (ref 3.7–5.3)
S PNEUM AG SPEC QL: NEGATIVE
SODIUM SERPL-SCNC: 142 MMOL/L (ref 135–145)
T3FREE SERPL-MCNC: 2.37 PG/ML (ref 2–3.5)
T4 FREE SERPL-MCNC: 0.97 NG/DL (ref 0.6–1.2)
THEOPHYLLINE SERPL-MCNC: 5.5 UG/ML (ref 10–20)
THEOPHYLLINE SERPL-MCNC: 8.5 UG/ML (ref 10–20)
TSH SERPL DL<=0.05 MIU/L-ACNC: 0.2 UIU/ML (ref 0.34–4.82)
WBC # BLD AUTO: 12.3 10*3/UL (ref 4.5–10.5)

## 2018-02-14 PROCEDURE — 85027 COMPLETE CBC AUTOMATED: CPT | Performed by: STUDENT IN AN ORGANIZED HEALTH CARE EDUCATION/TRAINING PROGRAM

## 2018-02-14 PROCEDURE — (BLANK) HC ROOM ICU INTERMEDIATE

## 2018-02-14 PROCEDURE — 84443 ASSAY THYROID STIM HORMONE: CPT | Performed by: INTERNAL MEDICINE

## 2018-02-14 PROCEDURE — 6360000200 HC RX 636 W HCPCS (ALT 250 FOR IP): Performed by: INTERNAL MEDICINE

## 2018-02-14 PROCEDURE — 80198 ASSAY OF THEOPHYLLINE: CPT | Performed by: INTERNAL MEDICINE

## 2018-02-14 PROCEDURE — 6370000100 HC RX 637 (ALT 250 FOR IP): Performed by: INTERNAL MEDICINE

## 2018-02-14 PROCEDURE — 87449 NOS EACH ORGANISM AG IA: CPT | Performed by: INTERNAL MEDICINE

## 2018-02-14 PROCEDURE — 83735 ASSAY OF MAGNESIUM: CPT | Performed by: STUDENT IN AN ORGANIZED HEALTH CARE EDUCATION/TRAINING PROGRAM

## 2018-02-14 PROCEDURE — 84481 FREE ASSAY (FT-3): CPT | Performed by: INTERNAL MEDICINE

## 2018-02-14 PROCEDURE — 99233 SBSQ HOSP IP/OBS HIGH 50: CPT | Performed by: INTERNAL MEDICINE

## 2018-02-14 PROCEDURE — 2590000100 HC RX 259: Performed by: INTERNAL MEDICINE

## 2018-02-14 PROCEDURE — 36415 COLL VENOUS BLD VENIPUNCTURE: CPT | Performed by: STUDENT IN AN ORGANIZED HEALTH CARE EDUCATION/TRAINING PROGRAM

## 2018-02-14 PROCEDURE — 83605 ASSAY OF LACTIC ACID: CPT

## 2018-02-14 PROCEDURE — 97530 THERAPEUTIC ACTIVITIES: CPT | Mod: GO

## 2018-02-14 PROCEDURE — 94640 AIRWAY INHALATION TREATMENT: CPT

## 2018-02-14 PROCEDURE — 80069 RENAL FUNCTION PANEL: CPT | Performed by: STUDENT IN AN ORGANIZED HEALTH CARE EDUCATION/TRAINING PROGRAM

## 2018-02-14 PROCEDURE — 84439 ASSAY OF FREE THYROXINE: CPT | Performed by: INTERNAL MEDICINE

## 2018-02-14 RX ORDER — DILTIAZEM HYDROCHLORIDE 30 MG/1
60 TABLET, FILM COATED ORAL EVERY 8 HOURS SCHEDULED
Status: DISCONTINUED | OUTPATIENT
Start: 2018-02-14 | End: 2018-02-15

## 2018-02-14 RX ORDER — DOXYCYCLINE 100 MG/1
100 CAPSULE ORAL
Status: DISCONTINUED | OUTPATIENT
Start: 2018-02-14 | End: 2018-02-17 | Stop reason: HOSPADM

## 2018-02-14 RX ORDER — GUAIFENESIN 600 MG/1
600 TABLET, EXTENDED RELEASE ORAL 2 TIMES DAILY
Status: DISCONTINUED | OUTPATIENT
Start: 2018-02-14 | End: 2018-02-17 | Stop reason: HOSPADM

## 2018-02-14 RX ORDER — MONTELUKAST SODIUM 10 MG/1
10 TABLET ORAL NIGHTLY
Status: DISCONTINUED | OUTPATIENT
Start: 2018-02-14 | End: 2018-02-17 | Stop reason: HOSPADM

## 2018-02-14 RX ORDER — BUDESONIDE AND FORMOTEROL FUMARATE DIHYDRATE 160; 4.5 UG/1; UG/1
2 AEROSOL RESPIRATORY (INHALATION)
Status: DISCONTINUED | OUTPATIENT
Start: 2018-02-14 | End: 2018-02-17 | Stop reason: HOSPADM

## 2018-02-14 RX ORDER — BENZONATATE 100 MG/1
200 CAPSULE ORAL 3 TIMES DAILY PRN
Status: DISCONTINUED | OUTPATIENT
Start: 2018-02-14 | End: 2018-02-17 | Stop reason: HOSPADM

## 2018-02-14 RX ORDER — CAMPHOR
1 SPIRIT TOPICAL AS NEEDED
Status: DISCONTINUED | OUTPATIENT
Start: 2018-02-14 | End: 2018-02-17 | Stop reason: HOSPADM

## 2018-02-14 RX ORDER — PREDNISONE 20 MG/1
40 TABLET ORAL DAILY
Status: DISCONTINUED | OUTPATIENT
Start: 2018-02-14 | End: 2018-02-17 | Stop reason: HOSPADM

## 2018-02-14 RX ORDER — LORATADINE 10 MG/1
10 TABLET ORAL DAILY
Status: DISCONTINUED | OUTPATIENT
Start: 2018-02-14 | End: 2018-02-17 | Stop reason: HOSPADM

## 2018-02-14 RX ORDER — HYDRALAZINE HYDROCHLORIDE 20 MG/ML
10 INJECTION INTRAMUSCULAR; INTRAVENOUS EVERY 4 HOURS PRN
Status: DISCONTINUED | OUTPATIENT
Start: 2018-02-14 | End: 2018-02-17 | Stop reason: HOSPADM

## 2018-02-14 RX ADMIN — ACETAMINOPHEN 650 MG: 325 TABLET ORAL at 20:25

## 2018-02-14 RX ADMIN — DILTIAZEM HYDROCHLORIDE 60 MG: 30 TABLET, FILM COATED ORAL at 14:51

## 2018-02-14 RX ADMIN — LORATADINE 10 MG: 10 TABLET ORAL at 10:12

## 2018-02-14 RX ADMIN — ALPRAZOLAM 0.25 MG: 0.25 TABLET ORAL at 10:12

## 2018-02-14 RX ADMIN — ENOXAPARIN SODIUM 40 MG: 40 INJECTION SUBCUTANEOUS at 14:50

## 2018-02-14 RX ADMIN — MONTELUKAST SODIUM 10 MG: 10 TABLET, FILM COATED ORAL at 20:20

## 2018-02-14 RX ADMIN — ALPRAZOLAM 0.25 MG: 0.25 TABLET ORAL at 14:50

## 2018-02-14 RX ADMIN — LEVALBUTEROL 1.25 MG: 1.25 SOLUTION, CONCENTRATE RESPIRATORY (INHALATION) at 13:25

## 2018-02-14 RX ADMIN — NYSTATIN 400000 UNITS: 100000 SUSPENSION ORAL at 10:16

## 2018-02-14 RX ADMIN — LEVALBUTEROL 1.25 MG: 1.25 SOLUTION, CONCENTRATE RESPIRATORY (INHALATION) at 20:08

## 2018-02-14 RX ADMIN — GUAIFENESIN 600 MG: 600 TABLET, EXTENDED RELEASE ORAL at 10:12

## 2018-02-14 RX ADMIN — DOXYCYCLINE 100 MG: 100 CAPSULE ORAL at 10:15

## 2018-02-14 RX ADMIN — IPRATROPIUM BROMIDE 0.5 MG: 0.5 SOLUTION RESPIRATORY (INHALATION) at 07:45

## 2018-02-14 RX ADMIN — PREDNISONE 40 MG: 20 TABLET ORAL at 10:12

## 2018-02-14 RX ADMIN — NYSTATIN 400000 UNITS: 100000 SUSPENSION ORAL at 14:51

## 2018-02-14 RX ADMIN — IPRATROPIUM BROMIDE 0.5 MG: 0.5 SOLUTION RESPIRATORY (INHALATION) at 13:25

## 2018-02-14 RX ADMIN — DOXYCYCLINE 100 MG: 100 CAPSULE ORAL at 18:41

## 2018-02-14 RX ADMIN — BUDESONIDE AND FORMOTEROL FUMARATE DIHYDRATE 2 PUFF: 160; 4.5 AEROSOL RESPIRATORY (INHALATION) at 10:26

## 2018-02-14 RX ADMIN — DOCUSATE SODIUM 100 MG: 100 CAPSULE, LIQUID FILLED ORAL at 20:21

## 2018-02-14 RX ADMIN — MINERAL OIL AND WHITE PETROLATUM 1 APPLICATION: 150; 830 OINTMENT OPHTHALMIC at 14:50

## 2018-02-14 RX ADMIN — ROFLUMILAST 500 MCG: 500 TABLET ORAL at 18:41

## 2018-02-14 RX ADMIN — DILTIAZEM HYDROCHLORIDE 60 MG: 30 TABLET, FILM COATED ORAL at 10:15

## 2018-02-14 RX ADMIN — NYSTATIN 400000 UNITS: 100000 SUSPENSION ORAL at 18:41

## 2018-02-14 RX ADMIN — ESOMEPRAZOLE MAGNESIUM 40 MG: 40 CAPSULE, DELAYED RELEASE ORAL at 18:41

## 2018-02-14 RX ADMIN — DOCUSATE SODIUM 100 MG: 100 CAPSULE, LIQUID FILLED ORAL at 10:12

## 2018-02-14 RX ADMIN — ALPRAZOLAM 0.25 MG: 0.25 TABLET ORAL at 20:20

## 2018-02-14 RX ADMIN — DILTIAZEM HYDROCHLORIDE 60 MG: 30 TABLET, FILM COATED ORAL at 20:21

## 2018-02-14 RX ADMIN — NYSTATIN 400000 UNITS: 100000 SUSPENSION ORAL at 20:20

## 2018-02-14 RX ADMIN — IPRATROPIUM BROMIDE 0.5 MG: 0.5 SOLUTION RESPIRATORY (INHALATION) at 20:08

## 2018-02-14 RX ADMIN — ACETAMINOPHEN 650 MG: 325 TABLET ORAL at 03:04

## 2018-02-14 RX ADMIN — BUDESONIDE AND FORMOTEROL FUMARATE DIHYDRATE 2 PUFF: 160; 4.5 AEROSOL RESPIRATORY (INHALATION) at 20:08

## 2018-02-14 RX ADMIN — THEOPHYLLINE ANHYDROUS 400 MG: 200 CAPSULE, EXTENDED RELEASE ORAL at 14:51

## 2018-02-14 RX ADMIN — GUAIFENESIN 600 MG: 600 TABLET, EXTENDED RELEASE ORAL at 20:22

## 2018-02-14 RX ADMIN — LEVALBUTEROL 1.25 MG: 1.25 SOLUTION, CONCENTRATE RESPIRATORY (INHALATION) at 07:45

## 2018-02-14 NOTE — INTERDISCIPLINARY/THERAPY
CM -[id number]  Rounded with care team and reviewed plan of care.  Introduced self to pt as her new .  Spoke with pt about LTAC- Michi from [facility] has said they are unable to take any medicaid pts at this time.  Pt is very concerned about any kind of travel.  States she gets exhaust fumes from ambulances as well as planes and goes into respiratory distress whenever she travels.  Pt is requiring 9L oximizer at this time.  Will not refer to other LTAC's at this time due to pt being adamantly against moving to one of these facilities. CM will continue to monitor.  PCP: Dr. Nelson  Dispo:  HH vs SNF/[facility]

## 2018-02-14 NOTE — PROGRESS NOTES
Date of Service: 2/14/2018    Time of Service: 9:47 AM    Patient name: Denita Spring  MRN: [id number]   LOS: 6 days     Subjective   I asked by the ICU service to follow the patient after her discharge to the floor.  Patient seen and examined. Discussed with nurse. Chart reviewed.  Patient reports feeling significantly improved.  Dyspnea is improving, has cough which is productive of clear sputum.  Denies any chest pain    Review of Systems  GEN: NAD  CV: denies chest pain  Pulm: Improving dyspnea  Abd: denies abd pain, n/v  Neuro: denies weakness  HEENT: denies visual disturbance  Ext: denies pain  Skin: denies rashes  MSK: denies pain      Objective     Temp:  [36.4 °C (97.5 °F)-36.7 °C (98.1 °F)] 36.5 °C (97.7 °F)  Heart Rate:  [109-137] 121  Resp:  [10-26] 20  BP: (110-144)/() 128/95    I/O last 3 completed shifts:  In: 1850 [P.O.:1840; I.V.:10]  Out: 1375 [Urine:1375]    No intake/output data recorded.    Physical Exam  Gen: NAD  HEENT: Atraumatic  Mouth: MMM  CV: +s1 +s2, tachycardic  Pulm: CTA B/L, no wheezing  Abd: +bs soft nt nd  Neuro: AAO, moves all ext  Skin: No rashes  Vasc: +pulses in b/l ue  Psych: appears comfortable      Medications:     Current Facility-Administered Medications:   •  acetaminophen (TYLENOL) tablet 325-650 mg, 325-650 mg, oral, q6h PRN, Yajaira Velasquez DO, 650 mg at 02/14/18 0304  •  ALPRAZolam (XANAX) tablet 0.25 mg, 0.25 mg, oral, 3x daily, Jose Short MD, 0.25 mg at 02/13/18 1951  •  ALPRAZolam (XANAX) tablet 0.25 mg, 0.25 mg, oral, Nightly PRN, Jose Short MD, 0.25 mg at 02/12/18 0415  •  alum-mag hydroxide-simeth (MAALOX ADVANCED) suspension 30 mL, 30 mL, oral, 3x daily PRN, Jose Koenig MD  •  benzonatate (TESSALON) capsule 200 mg, 200 mg, oral, 3x daily PRN, Cooper Francis MD  •  bisacodyl (DULCOLAX) suppository 10 mg, 10 mg, rectal, Daily PRN, Jose Koenig MD  •  budesonide-formoterol (SYMBICORT) 160-4.5 mcg/actuation inhaler 2 puff, 2 puff, inhalation, 2x  daily, Jonathan Esqueda MD  •  calamine-zinc oxide lotion 1 application, 1 application, Topical, PRN, Cooper Francis MD  •  diltiazem (CARDIZEM) tablet 60 mg, 60 mg, oral, q8h TASHA, Cooper Francis MD  •  docusate sodium (COLACE) capsule 100 mg, 100 mg, oral, 2x daily, Jose Koenig MD, 100 mg at 02/13/18 1951  •  doxycycline (VIBRAMYCIN) capsule 100 mg, 100 mg, oral, 2x daily before meals, Cooper Francis MD  •  enoxaparin (LOVENOX) syringe 40 mg, 40 mg, subcutaneous, Daily at 1400, Jose Koenig MD, 40 mg at 02/13/18 1404  •  esomeprazole (NexIUM) capsule 40 mg, 40 mg, oral, Daily at 1600, Jose Short MD, 40 mg at 02/13/18 1555  •  guaiFENesin (MUCINEX) 12 hr tablet 600 mg, 600 mg, oral, 2x daily, Cooper Francis MD  •  haloperidol lactate (HALDOL) injection 5 mg, 5 mg, intravenous, q6h PRN, Raymond Blackmon MD, 5 mg at 02/09/18 1405  •  hydrALAZINE (APRESOLINE) injection 10 mg, 10 mg, intravenous, q4h PRN, Cooper Francis MD  •  ipratropium (ATROVENT) 0.02 % nebulizer solution 0.5 mg, 0.5 mg, nebulization, 3x daily, [place]ander [place] DO, 0.5 mg at 02/14/18 0745  •  levalbuterol (XOPENEX) 1.25 mg/0.5 mL nebulizer solution 1.25 mg, 1.25 mg, nebulization, q2h PRN, Raymond Blackmon MD, 1.25 mg at 02/11/18 1132  •  levalbuterol (XOPENEX) 1.25 mg/0.5 mL nebulizer solution 1.25 mg, 1.25 mg, nebulization, 3x daily, Jose Short MD, 1.25 mg at 02/14/18 0745  •  loratadine (CLARITIN) tablet 10 mg, 10 mg, oral, Daily, Cooper Francis MD  •  magnesium hydroxide (MILK OF MAGNESIA) 400 mg/5 mL oral suspension 30 mL, 30 mL, oral, Daily PRN, Jose Koenig MD  •  magnesium sulfate 2 g in SWFI 50 mL IVPB (premix), 2 g, intravenous, Once PRN, [place] DO  •  montelukast (SINGULAIR) tablet 10 mg, 10 mg, oral, Nightly, Cooper Francis MD  •  nystatin (MYCOSTATIN) 100,000 unit/mL suspension 400,000 Units, 400,000 Units, oral, 4x daily, Yajaira Velasquez DO, 400,000 Units at 02/13/18 1952  •  ondansetron (ZOFRAN) tablet 4 mg, 4 mg,  oral, q6h PRN **OR** ondansetron (ZOFRAN) injection 4 mg, 4 mg, intravenous, q6h PRN, Jose Koenig MD  •  predniSONE (DELTASONE) tablet 40 mg, 40 mg, oral, Daily, Jose Short MD  •  roflumilast (DALIRESP) tablet 500 mcg, 500 mcg, oral, Daily with lunch, Jose Short MD, 500 mcg at 02/13/18 1151  •  Insert peripheral IV, , , Once **AND** Maintain IV access, , , Until discontinued **AND** Saline lock IV, , , Once **AND** sodium chloride flush 3 mL, 3 mL, intravenous, PRN, Jose Koenig MD  •  sodium chloride-aloe vera (AYR) nasal gel 1 application, 1 application, Each Nostril, PRN, [place] DO  •  theophylline (JAZMYNE-24) 24 hr capsule 400 mg, 400 mg, oral, Daily, Jose Short MD, 400 mg at 02/13/18 0817  •  traZODone (DESYREL) tablet 25 mg, 25 mg, oral, Nightly PRN, Jose Koenig MD  •  white petrolatum-mineral oil (LACRILUBE) ophthalmic ointment 1 application, 1 application, Both Eyes, 2x daily, Alex[place]ander [place] DO  •  white petrolatum-mineral oil (LACRILUBE) ophthalmic ointment 1 application, 1 application, Both Eyes, PRN, Alexsander Park DO    Admission on 02/08/2018   No results displayed because visit has over 200 results.            Assessment/Plan   Assessment:  Active Problems:    COPD exacerbation (CMS/[facility])    COPD with acute exacerbation (CMS/[facility])    COPD exacerbation, FEV1 of 0.5 L or 19% predicted  RSV  Acute on chronic hypoxemic respiratory failure, on 4 L at home       Patient feeling significantly improved.  Currently on 40 mg of prednisone.  Starting inhaled corticosteroid and laba with Symbicort.  Xopenex and ipratropium.  Given her tachycardia, check theophylline level as well as TSH.  Hypoxemia is improving, this should improve with increased mobility.  On doxycycline, she can complete a 7 day course, no obvious bacterial source of infection currently  Continue aggressive pulmonary hygiene  Xanax is helping with anxiety    DVT prophylaxis    Electronically signed by: ALEM MARTINEZ  MD  2/14/2018  9:47 AM

## 2018-02-14 NOTE — INTERDISCIPLINARY/THERAPY
02/14/18 1059   OT Last Visit   OT Received On 02/14/18   Subjective Comments   Subjective Comments Recieved RN's consent and pt is agreeable to participate in OT intervention. Following session, pt is left in BS chair with all of her needs within reach and chair alarm on.   (pt limited due to meal arriving)   General   Chart Reviewed Yes   OT Treatment Duration (Min) 10 Minutes   Is this a Co-Treatment? No   Family/Caregiver Present No   Pain Assessment   Pain Assessment No/denies pain   Balance   Balance Intact   Eating   Eating Level of Assistance Independent   Eating Where Assessed Bedside chair   Bed Mobility   Bed Mobility Not assessed   Transfers   Transfer Assessed   Transfer 1   Transfer From 1 Bed;Sit   Transfer Type 1 To   Transfer to 1 Stand   Technique 1 Sit to stand   Transfer Device 1 Transfer belt   Level of Assistance 1 Standby assistance   Transfers 2   Transfer From 2 Stand   Transfer Type 2 To   Transfer to 2 Stand   Technique 2 Sit to stand;Stand to sit   Transfer Device 1 Transfer belt   Level of Assistance 2 Standby assistance   Activity Tolerance   Position Standing;Sitting   Standing Endurance Tolerates less than 10 min exercise, no significant change in vital signs   Sitting Endurance Tolerates less than 10 min exercise, no significant change in vital signs   Activity Tolerance Comments pt demo'd no fatigue; or dizziness. pt verbalizes no SOB   Cognition   Arousal/Alertness WFL   Activity Tolerance   Activity Tolerance Patient tolerated treatment well  (limited due to meal)   Assessment   Rehab Potential Fair   Progress Slow progress, decreased activity tolerance   Recommendations for Therapy Continue skilled therapy   Assessment Comment pt demo'd well, safe with transfers; will continue to address ADL's to ensure safety during all functional tasks upon d/c.   Plan   Plan Comment G&H   Duration of Therapy 10   OT - Next Appointment 02/16/18  (POC due)

## 2018-02-14 NOTE — PLAN OF CARE
Problem: Respiratory - Adult  Goal: Achieves optimal ventilation and oxygenation  INTERVENTIONS:  1. Assess for changes in respiratory status  2. Assess for changes in mentation and behavior  3. Position to facilitate oxygenation and minimize respiratory effort  4. Oxygen supplementation based on oxygen saturation or ABGs  5. Assess patient's ability to cough effectively  6. Encourage broncho-pulmonary hygiene including cough, deep breathe  7. Assess the need for suctioning   8. Assess and instruct to report SOB or any respiratory difficulty  9. Respiratory Therapy support as indicated, including medications and treatment.   Outcome: Progressing   02/13/18 2103   Interventions Appropriate for this Patient   Achieves optimal ventilation and oxygenation Assess for changes in respiratory status;Assess for changes in mentation and behavior;Position to facilitate oxygenation and minimize respiratory effort;Oxygen supplementation based on oxygen saturation or arterial blood gases;Assess patient's ability to cough effectively;Encourage broncho-pulmonary hygiene including cough, deep breathe;Assess the need for suctioning;Respiratory Therapy support as indicated, including medications and treatment;Assess and instruct to report shortness of breath or any respiratory difficulty

## 2018-02-14 NOTE — PLAN OF CARE
Problem: Respiratory - Adult  Goal: Achieves optimal ventilation and oxygenation  INTERVENTIONS:  1. Assess for changes in respiratory status  2. Assess for changes in mentation and behavior  3. Position to facilitate oxygenation and minimize respiratory effort  4. Oxygen supplementation based on oxygen saturation or ABGs  5. Assess patient's ability to cough effectively  6. Encourage broncho-pulmonary hygiene including cough, deep breathe  7. Assess the need for suctioning   8. Assess and instruct to report SOB or any respiratory difficulty  9. Respiratory Therapy support as indicated, including medications and treatment.   Outcome: Progressing   02/13/18 2103   Interventions Appropriate for this Patient   Achieves optimal ventilation and oxygenation Assess for changes in respiratory status;Assess for changes in mentation and behavior;Position to facilitate oxygenation and minimize respiratory effort;Oxygen supplementation based on oxygen saturation or arterial blood gases;Assess patient's ability to cough effectively;Encourage broncho-pulmonary hygiene including cough, deep breathe;Assess the need for suctioning;Respiratory Therapy support as indicated, including medications and treatment;Assess and instruct to report shortness of breath or any respiratory difficulty       Problem: Neurosensory - Adult  Goal: Achieves stable or improved neurological status  INTERVENTIONS  1. Assess for and report changes in neurological status  2. Initiate measures to prevent increased intracranial pressure  3. Maintain blood pressure and fluid volume within ordered parameters to optimize cerebral perfusion and minimize risk of hemorrhage  4. Monitor temperature, glucose, and sodium. Initiate appropriate interventions as ordered   Outcome: Progressing   02/13/18 2103   Interventions Appropriate for this Patient   Achieves stable or improved neurological status Assess for and report changes in neurological status;Maintain blood  pressure and fluid volume within ordered parameters to optimize cerebral perfusion and minimize risk of hemorrhage;Monitor temperature, glucose, and sodium. Initiate appropriate interventions as ordered;Initiate measures to prevent increased intracranial pressure       Problem: Skin/Tissue Integrity - Adult  Goal: Incisions, wounds, or drain sites healing without S/S of infection  INTERVENTIONS  1. Assess and document risk factors for skin breakdown  2. Assess and document skin integrity  3. Assess and document dressing/incision, wound bed, drain sites and surrounding tissue  4. Implement wound care per orders   Outcome: Progressing   02/13/18 2103   Interventions Appropriate for this Patient   Incision(s), wound(s) or drain site(s) healing without S/S of infection Assess and document risk factors for skin breakdown;Assess and document dressing/incision, wound bed, drain sites and surrounding tissue;Implement wound care per orders;Assess and document skin integrity       Problem: Infection Control  Goal: MINIMIZE THE ACQUISITION AND TRANSMISSION OF INFECTIOUS AGENTS  INTERVENTIONS:  1. Isolate patient with suspected/diagnosed communicable disease  2. Place on designated isolation precautions  3. Maintain isolation techniques  4. Perform hand hygiene before and after each patient care activity  5. [place] universal precautions  6. Wear PPE as directed for type of isolation  7. Administer antibiotic therapy as ordered  8. Clean the environment appropriately after each patient use  9. Clean patient care equipment after each patient use as it leaves the room  10. Limit number of visitors, as appropriate   Outcome: Progressing   02/13/18 2103   Interventions Appropriate for this Patient   MINIMIZE THE ACQUISITION AND TRANSMISSION OF INFECT AGENTS  Isolate patient with suspected/diagnosed communicable disease;Place on designated isolation precautions;Perform hand hygiene before and after each patient care  activity;Maintain isolation techniques;[place] universal precautions;Clean patient care equipment after each patient use as it leaves the room;Clean the environment appropriately after each patient use;Wear PPE as directed for type of isolation;Administer antibiotic therapy as ordered;Educate the patient/visitors on hand hygiene technique and need to complete upon entering/exiting the patient's room;Limit number of visitors, as appropriate;Educate the patient/visitors on how to avoid the spread of infection       Problem: Knowledge Deficit  Goal: Patient/family/caregiver demonstrates understanding of disease process, treatment plan, medications, and discharge instructions  INTERVENTIONS:   1. Complete learning assessment and assess knowledge base  2. Provide teaching at level of understanding   3. Provide teaching via preferred learning methods   Outcome: Progressing   02/13/18 2103   Interventions Appropriate for this Patient   Patient/family/caregiver demonstrates understanding of disease process, treatment plan, medications, and discharge instructions Complete learning assessment and assess knowledge base;Provide teaching via preferred learning methods;Provide teaching at level of understanding       Problem: Potential for Compromised Skin Integrity  Goal: Skin Integrity is Maintained or Improved  INTERVENTIONS:  1. Assess and monitor skin integrity  2. Collaborate with interdisciplinary team and initiate plans and interventions as needed  3. Alternate a full bath with partial baths for elderly   4. Monitor patient's hygiene practices   5. Collaborate with wound, ostomy, and continence nurse   Outcome: Progressing   02/13/18 2103   Interventions Appropriate for this Patient   Skin integrity is maintained or improved Assess and monitor skin integrity;Collaborate with interdisciplinary team and initiate plans and interventions as needed;Monitor patient's hygiene practices;Collaborate with wound, ostomy, and  continence nurse     Goal: Nutritional status is improving  INTERVENTIONS:  1. Monitor and assess patient for malnutrition (ex- brittle hair, bruises, dry skin, pale skin and conjunctiva, muscle wasting, smooth red tongue, and disorientation)  2. Monitor patient's weight and dietary intake as ordered or per policy  3. Determine patient's food preferences and provide high-protein, high-caloric foods as appropriate  4. Assist patient with eating   5. Allow adequate time for meals   6. Encourage patient to take dietary supplement as ordered   7. Collaborate with dietitian  8. Include patient/family/caregiver in decisions related to nutrition   Outcome: Progressing   02/13/18 2103   Interventions Appropriate for this Patient   Nutritional status is improving Monitor and assess patient for malnutrition (ex- brittle hair, bruises, dry skin, pale skin and conjunctiva, muscle wasting, smooth red tongue, and disorientation);Determine patient's food preferences and provide high-protein, high-caloric foods as appropriate;Monitor patient's weight and dietary intake as ordered or per policy;Encourage patient to take dietary supplement as ordered;Collaborate with dietitian;Include patient/family/caregiver in decisions related to nutrition;Assist patient with eating;Allow adequate time for meals     Goal: MOBILITY IS MAINTAINED OR IMPROVED  INTERVENTIONS  1. Collaborate with interdisciplinary team and initiate plan and interventions as ordered (PT/OT)  2. Encourage ambulation  3. Up to chair for meals  4. Monitor for signs of deconditioning   Outcome: Progressing   02/13/18 2103   Interventions Appropriate for this Patient   Mobility is Maintained or Improved Collaborate with interdisciplinary team and initiate plan and interventions as ordered (PT/OT);Up to chair for meals;Monitor for signs of deconditioning;Encourage ambulation       Problem: Urinary Incontinence  Goal: Perineal skin integrity is maintained or  improved  INTERVENTIONS:  1. Assess genitourinary system, perineal skin, labs (urinalysis), and history of incontinence to include past management, aggravating, and alleviating factors  2. Collaborate with interdisciplinary team including wound, ostomy, and continence nurse and initiate plans and interventions as needed  4. Consider urine containment device  5. Apply skin protectant   6. Develop skin care regimen  7. Provide privacy when changing patient's incontinence device to maintain their dignity   Outcome: Progressing   02/13/18 2103   Interventions Appropriate for this Patient   Perineal skin integrity is maintained or improved Assess genitourinary system, perineal skin, labs (urinalysis), and history of incontinence to include past management, aggravating, and alleviating factors;Collaborate with interdisciplinary team including wound, ostomy, and continence nurse and initiate plans and interventions as needed;Consider urine containment device;Apply skin protectant;Provide privacy when changing patient's incontinence device to maintain their dignity;Develop skin care regimen       Problem: Safety Adult - Fall  Goal: Free from fall injury  INTERVENTIONS:    Please reference Cares/Safety Flowsheet under [place] Fall Risk for interventions.   Outcome: Progressing

## 2018-02-14 NOTE — INTERDISCIPLINARY/THERAPY
02/14/18 1355   General   Missed Time Reason Patient unwilling to participate  (Pt stating she is going to take a nap right now. )

## 2018-02-14 NOTE — PROGRESS NOTES
Hospitalist (Internal Medicine) Daily Progress Note:   LOS: 6 days     Subjective      No significant overnight issues noted.  Chart reviewed and patient was evaluated at bedside in presence of nursing staff.  Patient is resting in bed and appears mildly anxious and in moderate respiratory distress with failing to complete the sentence.  She is currently on 8 L oxygen Via [employer] but she reports she feels a lot better than yesterday with decreasing shortness of breath, decreasing cough and improving wheezing.  She still has dry cough with chest congestion, wheezing but denies high-grade fever, chills, dizziness or diaphoresis.  She also denied chest pain or palpitation though telemetry shows sinus tachycardia.    Objective     Vital signs:  Temp:  [36.4 °C (97.5 °F)-36.7 °C (98.1 °F)] 36.5 °C (97.7 °F)  Heart Rate:  [109-137] 121  Resp:  [10-26] 20  BP: (110-144)/() 128/95    Intake/Output Summary (Last 24 hours) at 02/14/18 0957  Last data filed at 02/13/18 2200   Gross per 24 hour   Intake             1600 ml   Output              275 ml   Net             1325 ml       Physical Exam  Patient is an adult lady, alert and oriented to time place and person, appears mildly anxious and in moderate respiratory distress.  HEENT: No pallor, no JVD, moist oral mucosa.  Oral thrush present.  On 8 L oxygen Via [employer].  Chest: Significant crackles both lungs.  Moderate expiratory wheeze.  CVS: Regular, sinus tachycardia at 100, no apparent murmurs.  Abdomen: Soft, nontender, nondistended, bowel sounds present.  Extremities: No edema.  Neuro: Non-focal.      Results from last 7 days  Lab Units 02/14/18  0431 02/13/18  0726 02/13/18  0725 02/12/18  0409   WBC AUTO 10*3/uL 12.3* 16.3*  --  13.1*   HEMOGLOBIN g/dL 12.4 12.9  --  13.0   HEMATOCRIT % 37.8 40.4  --  39.8   PLATELETS AUTO 10*3/uL 239 292  --  244   SODIUM mmol/L 142  --  142 142   POTASSIUM mmol/L 4.2  --  4.1 3.9   CHLORIDE mmol/L 99  --  100 98   CO2 mmol/L  37*  --  33* 34*   BUN mg/dL 14  --  15 17   CREATININE mg/dL 0.6  --  0.7 0.6   GLUCOSE mg/dL 127*  --  161* 161*   CALCIUM mg/dL 9.7  --  9.8 9.3   MAGNESIUM mg/dL 2.2  --  2.1 2.2   PHOSPHORUS mg/dL 3.8  --  3.1 4.3       Results from last 7 days  Lab Units 02/14/18  0431 02/13/18  0725 02/12/18  0409  02/09/18  0341 02/08/18  1547   ALBUMIN g/dL 3.4* 3.6 3.5  < > 3.5 4.0   TOTAL PROTEIN g/dL  --   --   --   --  5.9* 6.8   BILIRUBIN TOTAL mg/dL  --   --   --   --  0.32 0.34   ALK PHOS U/L  --   --   --   --  63 69   AST U/L  --   --   --   --  11 15   ALT U/L  --   --   --   --  22 26   TSH uIU/ml 0.205*  --   --   --   --   --    < > = values in this interval not displayed.    Xr Chest Portable 1 View    Result Date: 2/10/2018  Exam: Portable Chest AP 02/10/2018    Clinical History: Respiratory failure/fluid status Comparison(s): 2/8/2018 Findings: Emphysematous changes are again noted with hyperinflation of the lungs. No acute appearing infiltrate. Heart size and vascular markings are normal. Some blunting noted of both costophrenic angle suggesting small effusions appearing new.     1.  Small bilateral effusions which appear new. No other acute change.    X-ray Chest Portable 1 View    Result Date: 2/8/2018  Exam: Chest x-ray - AP portable upright view 02/08/2018 1608 hours Clinical History:  Shortness of breath Comparison/s:  12/2/2017 Findings:  The heart and pulmonary vasculature are normal. The mediastinal contours are normal. Emphysematous changes are present within the lungs, similar to previous. No lung consolidation or pleural effusion is present.      Emphysema.      Assessment/Plan   Principal Problem:    COPD with acute exacerbation (CMS/HCC)  Active Problems:    Acute bronchiolitis due to respiratory syncytial virus (RSV)    Acute-on-chronic respiratory failure (CMS/HCC)    Bronchitis    Chronic obstructive lung disease (CMS/HCC)    Obesity (BMI 30.0-34.9)    Plan:   Patient is 57 years obese lady  with oxygen dependent COPD, transferred out of ICU after management of acute bronchiolitis due to respiratory symptoms of virus causing acute COPD exacerbation and acute on chronic hypoxic respiratory failure and sepsis secondary to likely bacterial infection.  She was treated with steroids, BiPAP, nebulizers in the ICU with some symptomatic improvement.  Telemetry shows sinus tachycardia and echocardiogram with 60% EF, no other  Significant abnormalities..  · Case reviewed with ICU team.  · Pulmonary Dr. Esqueda's input appreciated in management of acute and chronic hypoxic respiratory failure from RSV infection with bronchiolitis with secondary bacterial infection and sepsis.  Continue with BiPAP as per pulmonary.    · Start on prednisone with slow tapering, loratadine, guaifenesin, Tessalon.  Also start on doxycycline for next 7 days for potential secondary bacterial infection.  Continue with nebulizers, oxygen supplement and other supportive care.  Resume Singulair.  Will check urinalysis, urine Legionella and strep pneumoniae.  · Patient has mild skin itching with no apparent rash.  Will try calamine lotion and continue with loratadine/prednisone.  She is allergic to diphenhydramine.  · Her leukocytosis appears improving.  Will continue to closely monitor.  · Continue the telemetry for now.  Continue the pain medications  · Patient has sinus tachycardia likely related to sepsis, hypoxia and use of beta-2 agonist.  Her suppressed TSH is concerning for hyperthyroidism versus theophylline toxicity.  Will check T3 and T4 and theophylline level.  We will try and diltiazem for rate control and start on hydralazine as needed for hypertension.  · Continue the PT/OT.  Activities as tolerated    DVT Prophylaxis: Lovenox    Code Status: Full Code    Anticipated date of discharge:  2/16/18    Further plan of care has been discussed with the patient and nursing staff at bedside.     Time spent: More than 35 minutes in direct  patient care and coordination.    Dragon voice recognition program was used to aid in this documentation which might generate inaccuracies despite efforts made to avoid them. Please take it into context and contact the provider if areas of error are identified.           RG PIERCE MD.   Hospitalist,   [facility].

## 2018-02-15 LAB
ANION GAP SERPL CALC-SCNC: 6 MMOL/L (ref 3–11)
BASOPHILS # BLD AUTO: 0 10*3/UL
BASOPHILS NFR BLD AUTO: 0 % (ref 0–2)
BUN SERPL-MCNC: 18 MG/DL (ref 7–25)
CALCIUM SERPL-MCNC: 9.8 MG/DL (ref 8.6–10.3)
CHLORIDE SERPL-SCNC: 101 MMOL/L (ref 98–107)
CO2 SERPL-SCNC: 37 MMOL/L (ref 21–32)
CREAT SERPL-MCNC: 0.6 MG/DL (ref 0.6–1.2)
EOSINOPHIL # BLD AUTO: 0 10*3/UL
EOSINOPHIL NFR BLD AUTO: 0 % (ref 0–3)
ERYTHROCYTE [DISTWIDTH] IN BLOOD BY AUTOMATED COUNT: 17 % (ref 11.5–14)
GFR SERPL CREATININE-BSD FRML MDRD: 104 ML/MIN/1.73M*2
GLUCOSE SERPL-MCNC: 90 MG/DL (ref 70–105)
HCT VFR BLD AUTO: 37.7 % (ref 34–45)
HGB BLD-MCNC: 12.2 G/DL (ref 11.5–15.5)
HYPOCHROMIA PRESENCE IN BLOOD, ANALYZER: ABNORMAL
LYMPHOCYTES # BLD AUTO: 2.8 10*3/UL
LYMPHOCYTES NFR BLD AUTO: 25 % (ref 11–47)
MCH RBC QN AUTO: 26.8 PG (ref 28–33)
MCHC RBC AUTO-ENTMCNC: 32.4 G/DL (ref 32–36)
MCV RBC AUTO: 82.7 FL (ref 81–97)
MONOCYTES # BLD AUTO: 0.8 10*3/UL
MONOCYTES NFR BLD AUTO: 7 % (ref 3–11)
NEUTROPHILS # BLD AUTO: 7.5 10*3/UL
NEUTROPHILS NFR BLD AUTO: 68 % (ref 41–81)
PLATELET # BLD AUTO: 248 10*3/UL (ref 140–350)
PMV BLD AUTO: 6.3 FL (ref 6.9–10.8)
POTASSIUM SERPL-SCNC: 3.9 MMOL/L (ref 3.5–5.1)
RBC # BLD AUTO: 4.56 10*6/ΜL (ref 3.7–5.3)
SODIUM SERPL-SCNC: 144 MMOL/L (ref 135–145)
WBC # BLD AUTO: 11.2 10*3/UL (ref 4.5–10.5)

## 2018-02-15 PROCEDURE — 99232 SBSQ HOSP IP/OBS MODERATE 35: CPT | Performed by: INTERNAL MEDICINE

## 2018-02-15 PROCEDURE — 6370000100 HC RX 637 (ALT 250 FOR IP): Performed by: INTERNAL MEDICINE

## 2018-02-15 PROCEDURE — 6370000100 HC RX 637 (ALT 250 FOR IP): Performed by: STUDENT IN AN ORGANIZED HEALTH CARE EDUCATION/TRAINING PROGRAM

## 2018-02-15 PROCEDURE — 36415 COLL VENOUS BLD VENIPUNCTURE: CPT | Performed by: INTERNAL MEDICINE

## 2018-02-15 PROCEDURE — 6360000200 HC RX 636 W HCPCS (ALT 250 FOR IP): Performed by: INTERNAL MEDICINE

## 2018-02-15 PROCEDURE — (BLANK) HC ROOM ICU INTERMEDIATE

## 2018-02-15 PROCEDURE — 97530 THERAPEUTIC ACTIVITIES: CPT | Mod: GO

## 2018-02-15 PROCEDURE — 85025 COMPLETE CBC W/AUTO DIFF WBC: CPT | Performed by: INTERNAL MEDICINE

## 2018-02-15 PROCEDURE — 2590000100 HC RX 259: Performed by: INTERNAL MEDICINE

## 2018-02-15 PROCEDURE — 80048 BASIC METABOLIC PNL TOTAL CA: CPT | Performed by: INTERNAL MEDICINE

## 2018-02-15 PROCEDURE — 99233 SBSQ HOSP IP/OBS HIGH 50: CPT | Performed by: INTERNAL MEDICINE

## 2018-02-15 PROCEDURE — 97530 THERAPEUTIC ACTIVITIES: CPT | Mod: GP | Performed by: PHYSICAL THERAPIST

## 2018-02-15 PROCEDURE — 94640 AIRWAY INHALATION TREATMENT: CPT

## 2018-02-15 RX ORDER — METOPROLOL TARTRATE 25 MG/1
25 TABLET, FILM COATED ORAL 2 TIMES DAILY
Status: DISCONTINUED | OUTPATIENT
Start: 2018-02-15 | End: 2018-02-17 | Stop reason: HOSPADM

## 2018-02-15 RX ORDER — HYDROCORTISONE 1 %
CREAM (GRAM) TOPICAL 2 TIMES DAILY
Status: DISCONTINUED | OUTPATIENT
Start: 2018-02-15 | End: 2018-02-17 | Stop reason: HOSPADM

## 2018-02-15 RX ORDER — FUROSEMIDE 10 MG/ML
20 INJECTION INTRAMUSCULAR; INTRAVENOUS ONCE
Status: COMPLETED | OUTPATIENT
Start: 2018-02-15 | End: 2018-02-15

## 2018-02-15 RX ORDER — DILTIAZEM HYDROCHLORIDE 240 MG/1
240 CAPSULE, COATED, EXTENDED RELEASE ORAL DAILY
Status: DISCONTINUED | OUTPATIENT
Start: 2018-02-15 | End: 2018-02-17 | Stop reason: HOSPADM

## 2018-02-15 RX ADMIN — DOXYCYCLINE 100 MG: 100 CAPSULE ORAL at 06:40

## 2018-02-15 RX ADMIN — MONTELUKAST SODIUM 10 MG: 10 TABLET, FILM COATED ORAL at 21:42

## 2018-02-15 RX ADMIN — ENOXAPARIN SODIUM 40 MG: 40 INJECTION SUBCUTANEOUS at 18:22

## 2018-02-15 RX ADMIN — ACETAMINOPHEN 650 MG: 325 TABLET ORAL at 06:40

## 2018-02-15 RX ADMIN — ALPRAZOLAM 0.25 MG: 0.25 TABLET ORAL at 21:55

## 2018-02-15 RX ADMIN — NYSTATIN 400000 UNITS: 100000 SUSPENSION ORAL at 21:42

## 2018-02-15 RX ADMIN — LORATADINE 10 MG: 10 TABLET ORAL at 08:18

## 2018-02-15 RX ADMIN — GUAIFENESIN 600 MG: 600 TABLET, EXTENDED RELEASE ORAL at 21:40

## 2018-02-15 RX ADMIN — IPRATROPIUM BROMIDE 0.5 MG: 0.5 SOLUTION RESPIRATORY (INHALATION) at 08:04

## 2018-02-15 RX ADMIN — HYDROCORTISONE 1 APPLICATION: 1 CREAM TOPICAL at 21:00

## 2018-02-15 RX ADMIN — ALPRAZOLAM 0.25 MG: 0.25 TABLET ORAL at 08:19

## 2018-02-15 RX ADMIN — METOPROLOL TARTRATE 25 MG: 25 TABLET ORAL at 11:28

## 2018-02-15 RX ADMIN — LEVALBUTEROL 1.25 MG: 1.25 SOLUTION, CONCENTRATE RESPIRATORY (INHALATION) at 20:05

## 2018-02-15 RX ADMIN — TRAZODONE HYDROCHLORIDE 25 MG: 50 TABLET ORAL at 21:41

## 2018-02-15 RX ADMIN — LEVALBUTEROL 1.25 MG: 1.25 SOLUTION, CONCENTRATE RESPIRATORY (INHALATION) at 13:27

## 2018-02-15 RX ADMIN — ALPRAZOLAM 0.25 MG: 0.25 TABLET ORAL at 18:21

## 2018-02-15 RX ADMIN — ESOMEPRAZOLE MAGNESIUM 40 MG: 40 CAPSULE, DELAYED RELEASE ORAL at 18:22

## 2018-02-15 RX ADMIN — DILTIAZEM HYDROCHLORIDE 240 MG: 240 CAPSULE, COATED, EXTENDED RELEASE ORAL at 18:24

## 2018-02-15 RX ADMIN — ACETAMINOPHEN 650 MG: 325 TABLET ORAL at 17:51

## 2018-02-15 RX ADMIN — FUROSEMIDE 20 MG: 10 INJECTION, SOLUTION INTRAMUSCULAR; INTRAVENOUS at 11:28

## 2018-02-15 RX ADMIN — GUAIFENESIN 600 MG: 600 TABLET, EXTENDED RELEASE ORAL at 08:18

## 2018-02-15 RX ADMIN — THEOPHYLLINE ANHYDROUS 400 MG: 200 CAPSULE, EXTENDED RELEASE ORAL at 08:18

## 2018-02-15 RX ADMIN — DOCUSATE SODIUM 100 MG: 100 CAPSULE, LIQUID FILLED ORAL at 08:18

## 2018-02-15 RX ADMIN — PREDNISONE 40 MG: 20 TABLET ORAL at 08:19

## 2018-02-15 RX ADMIN — HYDROCORTISONE 1 APPLICATION: 1 CREAM TOPICAL at 11:30

## 2018-02-15 RX ADMIN — LEVALBUTEROL 1.25 MG: 1.25 SOLUTION, CONCENTRATE RESPIRATORY (INHALATION) at 08:04

## 2018-02-15 RX ADMIN — METOPROLOL TARTRATE 25 MG: 25 TABLET ORAL at 21:39

## 2018-02-15 RX ADMIN — IPRATROPIUM BROMIDE 0.5 MG: 0.5 SOLUTION RESPIRATORY (INHALATION) at 13:27

## 2018-02-15 RX ADMIN — BUDESONIDE AND FORMOTEROL FUMARATE DIHYDRATE 2 PUFF: 160; 4.5 AEROSOL RESPIRATORY (INHALATION) at 08:04

## 2018-02-15 RX ADMIN — ROFLUMILAST 500 MCG: 500 TABLET ORAL at 11:28

## 2018-02-15 RX ADMIN — BUDESONIDE AND FORMOTEROL FUMARATE DIHYDRATE 2 PUFF: 160; 4.5 AEROSOL RESPIRATORY (INHALATION) at 20:05

## 2018-02-15 RX ADMIN — DILTIAZEM HYDROCHLORIDE 60 MG: 30 TABLET, FILM COATED ORAL at 06:39

## 2018-02-15 RX ADMIN — IPRATROPIUM BROMIDE 0.5 MG: 0.5 SOLUTION RESPIRATORY (INHALATION) at 20:05

## 2018-02-15 RX ADMIN — DOCUSATE SODIUM 100 MG: 100 CAPSULE, LIQUID FILLED ORAL at 21:39

## 2018-02-15 RX ADMIN — DOXYCYCLINE 100 MG: 100 CAPSULE ORAL at 18:22

## 2018-02-15 RX ADMIN — NYSTATIN 400000 UNITS: 100000 SUSPENSION ORAL at 06:43

## 2018-02-15 NOTE — PROGRESS NOTES
Hospitalist (Internal Medicine) Daily Progress Note:   LOS: 7 days     Subjective      No significant overnight issues noted.  Chart reviewed and patient was evaluated at bedside in presence of nursing staff.  Patient is resting in chair and appears mildly anxious.  She is still on 8 L oxygen Via [employer] and has significant shortness of breathing on the speaking.  She reports still having significant cough, wheeze, chest congestion, and shortness of breath but overall symptoms appear improving.  She denies fever or chills, dizziness or diaphoresis, palpitation but telemetry shows sinus tachycardia .    Objective     Vital signs:  Temp:  [35.9 °C (96.6 °F)-37.1 °C (98.8 °F)] 36.8 °C (98.2 °F)  Heart Rate:  [] 121  Resp:  [20-24] 24  BP: (109-132)/(68-89) 132/89    Intake/Output Summary (Last 24 hours) at 02/15/18 1112  Last data filed at 02/15/18 0600   Gross per 24 hour   Intake              400 ml   Output             1450 ml   Net            -1050 ml       Physical Exam  Denita is resting in chair, appears mildly anxious and still in moderate respiratory distress.  HEENT: No pallor, no JVD, On 8 L oxygen Via [employer].  Chest: Significant crackles both lungs.  Slightly improved expiratory wheeze.  CVS: Regular, sinus tachycardia at 115s, no murmurs.  Abdomen: Soft,  bowel sounds present.  Extremities: No edema.      Results from last 7 days  Lab Units 02/15/18  0625 02/14/18  0431 02/13/18  0726 02/13/18  0725 02/12/18  0409   WBC AUTO 10*3/uL 11.2* 12.3* 16.3*  --  13.1*   HEMOGLOBIN g/dL 12.2 12.4 12.9  --  13.0   HEMATOCRIT % 37.7 37.8 40.4  --  39.8   PLATELETS AUTO 10*3/uL 248 239 292  --  244   SODIUM mmol/L 144 142  --  142 142   POTASSIUM mmol/L 3.9 4.2  --  4.1 3.9   CHLORIDE mmol/L 101 99  --  100 98   CO2 mmol/L 37* 37*  --  33* 34*   BUN mg/dL 18 14  --  15 17   CREATININE mg/dL 0.6 0.6  --  0.7 0.6   GLUCOSE mg/dL 90 127*  --  161* 161*   CALCIUM mg/dL 9.8 9.7  --  9.8 9.3   MAGNESIUM mg/dL  --   2.2  --  2.1 2.2   PHOSPHORUS mg/dL  --  3.8  --  3.1 4.3       Results from last 7 days  Lab Units 02/14/18  0431 02/13/18  0725 02/12/18  0409  02/09/18  0341 02/08/18  1547   ALBUMIN g/dL 3.4* 3.6 3.5  < > 3.5 4.0   TOTAL PROTEIN g/dL  --   --   --   --  5.9* 6.8   BILIRUBIN TOTAL mg/dL  --   --   --   --  0.32 0.34   ALK PHOS U/L  --   --   --   --  63 69   AST U/L  --   --   --   --  11 15   ALT U/L  --   --   --   --  22 26   TSH uIU/ml 0.205*  --   --   --   --   --    FREE T4 ng/dL 0.97  --   --   --   --   --    < > = values in this interval not displayed.      Assessment/Plan   Principal Problem:    COPD with acute exacerbation (CMS/[facility])  Active Problems:    Acute bronchiolitis due to respiratory syncytial virus (RSV)    Acute-on-chronic respiratory failure (CMS/[facility])    Bronchitis    Chronic obstructive lung disease (CMS/[facility])    Obesity (BMI 30.0-34.9)    Plan:   Patient is 57 years obese lady with oxygen dependent COPD, transferred out of ICU after management of acute bronchiolitis due to respiratory symptoms of virus causing acute COPD exacerbation and acute on chronic hypoxic respiratory failure and sepsis secondary to likely bacterial infection.  She was treated with steroids, BiPAP, nebulizers in the ICU with some symptomatic improvement.  Telemetry shows sinus tachycardia and echocardiogram with 60% EF, no other  Significant abnormalities..  · Pulmonary Dr. Esqueda's input appreciated in management of acute and chronic hypoxic respiratory failure from RSV infection with bronchiolitis with secondary bacterial infection and sepsis.  Continue with BiPAP as per pulmonary.    · Continue on prednisone with slow tapering, loratadine, guaifenesin, Tessalon.  Also continue doxycycline for next 5 days for potential secondary bacterial infection.  Continue with nebulizers, oxygen supplement and other supportive care.  Continue with Singulair.    · Continue calamine lotion, loratadine/prednisone.  She is allergic  to diphenhydramine.  · Her leukocytosis appears improving.  Will continue to closely monitor.  · Continue the telemetry for now.  Continue the pain medications  · Patient has sinus tachycardia likely related to sepsis, hypoxia and use of beta-2 agonist.  Her TSH is suppressed but her T3 and T4 is within normal limit and theophylline level is within normal limits.  Change diltiazem CD and had metoprolol for rate control.  Continue with hydralazine as needed for hypertension.  · We will try Lasix and fluid restriction for possible pulmonary edema.  · Continue the PT/OT.  Activities as tolerated    DVT Prophylaxis: Lovenox    Code Status: Full Code    Anticipated date of discharge:  2/16/18    Further plan of care has been discussed with the patient and nursing staff at bedside.     Time spent:  36 minutes in direct patient care and coordination.    Dragon voice recognition program was used to aid in this documentation which might generate inaccuracies despite efforts made to avoid them. Please take it into context and contact the provider if areas of error are identified.           RG PIERCE MD.   Hospitalist,   [facility].

## 2018-02-15 NOTE — INTERDISCIPLINARY/THERAPY
02/15/18 1103   PT Last Visit   PT Received On 02/15/18   General   Chart Reviewed Yes   Therapy Treatment Diagnosis COPD with acute exacerbation   PT Treatment Duration (Min) 12 Minutes   Is this a Co-Treatment? No   Additional Pertinent History asthma, arthritis, RSV, osteoporosis   Family/Caregiver Present No   Precautions   Other Precautions fall, very minimal actrivity tolerance, monitor vitals closely   Subjective Comments   Subjective Comments RN ok'd therapy. Pt seated in chair upon arrival and agreeable to therapy. Pt returned to chair at end of sesion with call light withinr each.   Activity Tolerance   Standing Endurance Tolerates less than 10 min exercise, no significant change in vital signs   Transfer 1   Transfer From 1 Sit   Transfer Type 1 To and from   Transfer to 1 Stand   Transfer Device 1 Transfer belt   Level of Assistance 1 Standby assistance   Trials/Comments 1 Pt stood from chair x 3   Ambulation 1   Surface 1 Level surface;Smooth   Device 1 No device   Assistance 1 Standby assistance   Quality of Gait 1 slow steady gait, limited by SOB, pt on 9L O2 per oximizer. SPO2 stayed above 90 with activity   Comments/Distance 1 6m x 3 with seated rest in between.   Assessment   Rehab Potential Fair   Progress Slow progress, medical status limitations   Problem List Decreased strength;Decreased range of motion;Decreased endurance   Problem List Comments increased O2 needs   Assessment Comment Pt tolerance to activity limted by SPO2. Pt steady with ambulation of 6m or less before needing to sit down.   Plan   Treatment Interventions Bed mobility;Functional transfer training;Gait training;Balance training;Endurance training;Therapeutic activities;Therapeutic exercises   PT Frequency 3-5x/wk   Treatment Duration  12min   PT - OK to Discharge No   Plan Comment bed mobility, gait as tolerated

## 2018-02-15 NOTE — INTERDISCIPLINARY/THERAPY
02/15/18 1022   OT Last Visit   OT Received On 02/15/18   Subjective Comments   Subjective Comments Recieved RN's consent and pt is agreeable to participate in OT intervention. pt declines any functional mob; or ADL's at this time. Following session, pt is left in BS chair with all of her needs within reach.     General   Chart Reviewed Yes   OT Treatment Duration (Min) 11 Minutes   Is this a Co-Treatment? No   Family/Caregiver Present No   Pain Assessment   Pain Assessment No/denies pain   Grooming   Grooming Comments Declines ADL's   Transfers   Transfer Not Assessed  (Declines functional mob)   Light Housekeeping   Light Housekeeping pt verbalizing having assistance from extended family for house cares   Additional Activities   Additional Activities Comments pt educated on energy conservation techniques and was provided with handout. pt agreeable and verbalizes understanding.    Cognition   Arousal/Alertness WFL   Activity Tolerance   Activity Tolerance Patient limited by fatigue   Assessment   Rehab Potential Fair   Progress Slow progress, medical status limitations   Recommendations for Therapy Continue skilled therapy   Assessment Comment pt demo'd well however continues to be limited towards participation in OT services due to SOB and fatigue. Will continue to increase endurance and safety during ADLs.    Plan   Plan Comment G&H at sink; energy conservation during tasks   Duration of Therapy 11   OT - Next Appointment 02/16/18  (POC due)

## 2018-02-15 NOTE — PROGRESS NOTES
Date of Service: 2/15/2018    Time of Service: 9:55 AM    Patient name: Denita Spring  MRN: [id number]   LOS: 7 days     Subjective   Patient seen and examined. Discussed with nurse. Chart reviewed.  Patient reports continued improvement, denies any chest pain, breathing is stable    Review of Systems  GEN: NAD  CV: denies chest pain  Pulm: Improving dyspnea  Abd: denies abd pain, n/v  Neuro: denies weakness  HEENT: denies visual disturbance  Ext: denies pain  Skin: denies rashes  MSK: denies pain      Objective     Temp:  [35.9 °C (96.6 °F)-37.1 °C (98.8 °F)] 36.8 °C (98.2 °F)  Heart Rate:  [] 121  Resp:  [20-24] 24  BP: (109-132)/(68-89) 132/89    I/O last 3 completed shifts:  In: 900 [P.O.:900]  Out: 1450 [Urine:1450]    No intake/output data recorded.    Physical Exam  Gen: NAD  HEENT: Atraumatic  Mouth: MMM  CV: +s1 +s2, tachycardic  Pulm: CTA B/L, no wheezing  Abd: +bs soft nt nd  Neuro: AAO, moves all ext  Skin: No rashes  Vasc: +pulses in b/l ue  Psych: appears comfortable      Medications:     Current Facility-Administered Medications:   •  acetaminophen (TYLENOL) tablet 325-650 mg, 325-650 mg, oral, q6h PRN, Yajaira Velasquez DO, 650 mg at 02/15/18 0640  •  ALPRAZolam (XANAX) tablet 0.25 mg, 0.25 mg, oral, 3x daily, Jose Short MD, 0.25 mg at 02/15/18 0819  •  ALPRAZolam (XANAX) tablet 0.25 mg, 0.25 mg, oral, Nightly PRN, Jose Short MD, 0.25 mg at 02/12/18 0415  •  alum-mag hydroxide-simeth (MAALOX ADVANCED) suspension 30 mL, 30 mL, oral, 3x daily PRN, Jose Koenig MD  •  benzonatate (TESSALON) capsule 200 mg, 200 mg, oral, 3x daily PRN, Cooper Francis MD  •  bisacodyl (DULCOLAX) suppository 10 mg, 10 mg, rectal, Daily PRN, Jose Koenig MD  •  budesonide-formoterol (SYMBICORT) 160-4.5 mcg/actuation inhaler 2 puff, 2 puff, inhalation, 2x daily, Jonathan Esqueda MD, 2 puff at 02/15/18 0804  •  calamine-zinc oxide lotion 1 application, 1 application, Topical, PRN, Cooper Francis MD  •  diltiazem  (CARDIZEM) tablet 60 mg, 60 mg, oral, q8h TASHA, Cooper Francis MD, 60 mg at 02/15/18 0639  •  docusate sodium (COLACE) capsule 100 mg, 100 mg, oral, 2x daily, Jose Koenig MD, 100 mg at 02/15/18 0818  •  doxycycline (VIBRAMYCIN) capsule 100 mg, 100 mg, oral, 2x daily before meals, Cooper Francis MD, 100 mg at 02/15/18 0640  •  enoxaparin (LOVENOX) syringe 40 mg, 40 mg, subcutaneous, Daily at 1400, Jose Koenig MD, 40 mg at 02/14/18 1450  •  esomeprazole (NexIUM) capsule 40 mg, 40 mg, oral, Daily at 1600, Jose Short MD, 40 mg at 02/14/18 1841  •  guaiFENesin (MUCINEX) 12 hr tablet 600 mg, 600 mg, oral, 2x daily, Cooper Francis MD, 600 mg at 02/15/18 0818  •  haloperidol lactate (HALDOL) injection 5 mg, 5 mg, intravenous, q6h PRN, Raymond Blackmon MD, 5 mg at 02/09/18 1405  •  hydrALAZINE (APRESOLINE) injection 10 mg, 10 mg, intravenous, q4h PRN, Cooper Francis MD  •  inhalational spacing device spacer, , , ,   •  ipratropium (ATROVENT) 0.02 % nebulizer solution 0.5 mg, 0.5 mg, nebulization, 3x daily, [place] DO, 0.5 mg at 02/15/18 0804  •  levalbuterol (XOPENEX) 1.25 mg/0.5 mL nebulizer solution 1.25 mg, 1.25 mg, nebulization, q2h PRN, Rayomnd Blackmon MD, 1.25 mg at 02/11/18 1132  •  levalbuterol (XOPENEX) 1.25 mg/0.5 mL nebulizer solution 1.25 mg, 1.25 mg, nebulization, 3x daily, Jose Short MD, 1.25 mg at 02/15/18 0804  •  loratadine (CLARITIN) tablet 10 mg, 10 mg, oral, Daily, Cooper Francis MD, 10 mg at 02/15/18 0818  •  magnesium hydroxide (MILK OF MAGNESIA) 400 mg/5 mL oral suspension 30 mL, 30 mL, oral, Daily PRN, Jose Koenig MD  •  magnesium sulfate 2 g in SWFI 50 mL IVPB (premix), 2 g, intravenous, Once PRN, [place] DO  •  montelukast (SINGULAIR) tablet 10 mg, 10 mg, oral, Nightly, Cooper Francis MD, 10 mg at 02/14/18 2020  •  nystatin (MYCOSTATIN) 100,000 unit/mL suspension 400,000 Units, 400,000 Units, oral, 4x daily, Yajaira Velasquez DO, 400,000 Units at 02/15/18 0643  •   ondansetron (ZOFRAN) tablet 4 mg, 4 mg, oral, q6h PRN **OR** ondansetron (ZOFRAN) injection 4 mg, 4 mg, intravenous, q6h PRN, Jose Koenig MD  •  predniSONE (DELTASONE) tablet 40 mg, 40 mg, oral, Daily, Jose Short MD, 40 mg at 02/15/18 0819  •  roflumilast (DALIRESP) tablet 500 mcg, 500 mcg, oral, Daily with lunch, Jose Short MD, 500 mcg at 02/14/18 1841  •  Insert peripheral IV, , , Once **AND** Maintain IV access, , , Until discontinued **AND** Saline lock IV, , , Once **AND** sodium chloride flush 3 mL, 3 mL, intravenous, PRN, Jose Koenig MD  •  sodium chloride-aloe vera (AYR) nasal gel 1 application, 1 application, Each Nostril, PRN, [place] DO  •  theophylline (JAZMYNE-24) 24 hr capsule 400 mg, 400 mg, oral, Daily, Jose Short MD, 400 mg at 02/15/18 0818  •  traZODone (DESYREL) tablet 25 mg, 25 mg, oral, Nightly PRN, Jose Koenig MD  •  white petrolatum-mineral oil (LACRILUBE) ophthalmic ointment 1 application, 1 application, Both Eyes, 2x daily, [place] DO, 1 application at 02/14/18 1450  •  white petrolatum-mineral oil (LACRILUBE) ophthalmic ointment 1 application, 1 application, Both Eyes, PRN, [place] DO    Admission on 02/08/2018   No results displayed because visit has over 200 results.            Assessment/Plan   Assessment:  Principal Problem:    COPD with acute exacerbation (CMS/[facility])  Active Problems:    Acute-on-chronic respiratory failure (CMS/[facility])    Bronchitis    Chronic obstructive lung disease (CMS/[facility])    Obesity (BMI 30.0-34.9)    Acute bronchiolitis due to respiratory syncytial virus (RSV)    COPD exacerbation, FEV1 of 0.5 L or 19% predicted  RSV  Acute on chronic hypoxemic respiratory failure, on 4 L at home       Continue 40 mg of prednisone.  Tinea Symbicort, Xopenex and ipratropium.  Theophylline level not elevated, TSH was elevated however free T4 is normal.  These are not the driving factors in her tachycardia  Hypoxemia is improving, titrate  down oxygen and increase her mobility  On doxycycline, she can complete a 7 day course, no obvious bacterial source of infection currently  Continue aggressive pulmonary hygiene  Xanax is helping with anxiety    DVT prophylaxis    Electronically signed by: ALEM MARTINEZ MD  2/15/2018  9:55 AM

## 2018-02-15 NOTE — PLAN OF CARE
Problem: Respiratory - Adult  Goal: Achieves optimal ventilation and oxygenation  INTERVENTIONS:  1. Assess for changes in respiratory status  2. Assess for changes in mentation and behavior  3. Position to facilitate oxygenation and minimize respiratory effort  4. Oxygen supplementation based on oxygen saturation or ABGs  5. Assess patient's ability to cough effectively  6. Encourage broncho-pulmonary hygiene including cough, deep breathe  7. Assess the need for suctioning   8. Assess and instruct to report SOB or any respiratory difficulty  9. Respiratory Therapy support as indicated, including medications and treatment.   Outcome: Progressing   02/14/18 2016   Interventions Appropriate for this Patient   Achieves optimal ventilation and oxygenation Assess for changes in respiratory status;Assess for changes in mentation and behavior;Position to facilitate oxygenation and minimize respiratory effort;Oxygen supplementation based on oxygen saturation or arterial blood gases;Assess patient's ability to cough effectively;Encourage broncho-pulmonary hygiene including cough, deep breathe;Assess the need for suctioning;Assess and instruct to report shortness of breath or any respiratory difficulty;Respiratory Therapy support as indicated, including medications and treatment

## 2018-02-16 ENCOUNTER — APPOINTMENT (OUTPATIENT)
Dept: RADIOLOGY | Facility: HOSPITAL | Age: 58
DRG: 189 | End: 2018-02-16
Attending: INTERNAL MEDICINE
Payer: COMMERCIAL

## 2018-02-16 LAB
ANION GAP SERPL CALC-SCNC: 6 MMOL/L (ref 3–11)
ANISOCYTOSIS PRESENCE IN BLOOD, ANALYZER: ABNORMAL
BACTERIA #/AREA URNS AUTO: NORMAL /HPF
BASOPHILS # BLD AUTO: 0 10*3/UL
BASOPHILS NFR BLD AUTO: 0 % (ref 0–2)
BILIRUB UR QL STRIP.AUTO: NEGATIVE
BUN SERPL-MCNC: 28 MG/DL (ref 7–25)
CALCIUM SERPL-MCNC: 9.7 MG/DL (ref 8.6–10.3)
CHLORIDE SERPL-SCNC: 100 MMOL/L (ref 98–107)
CLARITY UR: CLEAR
CO2 SERPL-SCNC: 36 MMOL/L (ref 21–32)
COLOR UR: YELLOW
CREAT SERPL-MCNC: 0.7 MG/DL (ref 0.6–1.2)
EOSINOPHIL # BLD AUTO: 0 10*3/UL
EOSINOPHIL NFR BLD AUTO: 0 % (ref 0–3)
ERYTHROCYTE [DISTWIDTH] IN BLOOD BY AUTOMATED COUNT: 17.2 % (ref 11.5–14)
GFR SERPL CREATININE-BSD FRML MDRD: 87 ML/MIN/1.73M*2
GLUCOSE SERPL-MCNC: 84 MG/DL (ref 70–105)
GLUCOSE UR STRIP.AUTO-MCNC: NEGATIVE MG/DL
HCT VFR BLD AUTO: 37.2 % (ref 34–45)
HGB BLD-MCNC: 12 G/DL (ref 11.5–15.5)
HGB UR QL STRIP.AUTO: NEGATIVE
HYPOCHROMIA PRESENCE IN BLOOD, ANALYZER: ABNORMAL
KETONES UR STRIP.AUTO-MCNC: NEGATIVE MG/DL
LEUKOCYTE ESTERASE UR QL STRIP: NEGATIVE
LYMPHOCYTES # BLD AUTO: 2.2 10*3/UL
LYMPHOCYTES NFR BLD AUTO: 19 % (ref 11–47)
MCH RBC QN AUTO: 26.8 PG (ref 28–33)
MCHC RBC AUTO-ENTMCNC: 32.2 G/DL (ref 32–36)
MCV RBC AUTO: 83.3 FL (ref 81–97)
MONOCYTES # BLD AUTO: 0.9 10*3/UL
MONOCYTES NFR BLD AUTO: 7 % (ref 3–11)
NEUTROPHILS # BLD AUTO: 8.7 10*3/UL
NEUTROPHILS NFR BLD AUTO: 73 % (ref 41–81)
NITRITE UR QL STRIP.AUTO: NEGATIVE
PH UR STRIP.AUTO: 6 PH
PLATELET # BLD AUTO: 247 10*3/UL (ref 140–350)
PMV BLD AUTO: 6.6 FL (ref 6.9–10.8)
POTASSIUM SERPL-SCNC: 4.4 MMOL/L (ref 3.5–5.1)
PROT UR STRIP.AUTO-MCNC: NEGATIVE MG/DL
RBC # BLD AUTO: 4.46 10*6/ΜL (ref 3.7–5.3)
RBC #/AREA URNS AUTO: NORMAL /HPF
SODIUM SERPL-SCNC: 142 MMOL/L (ref 135–145)
SP GR UR STRIP.AUTO: 1.02 (ref 1–1.03)
SQUAMOUS #/AREA URNS AUTO: NORMAL /HPF
UROBILINOGEN UR STRIP.AUTO-MCNC: <2 E.U./DL
WBC # BLD AUTO: 11.8 10*3/UL (ref 4.5–10.5)
WBC #/AREA URNS AUTO: NORMAL /HPF

## 2018-02-16 PROCEDURE — 97530 THERAPEUTIC ACTIVITIES: CPT | Mod: GO

## 2018-02-16 PROCEDURE — 6370000100 HC RX 637 (ALT 250 FOR IP): Performed by: INTERNAL MEDICINE

## 2018-02-16 PROCEDURE — 2590000100 HC RX 259: Performed by: INTERNAL MEDICINE

## 2018-02-16 PROCEDURE — 99232 SBSQ HOSP IP/OBS MODERATE 35: CPT | Performed by: INTERNAL MEDICINE

## 2018-02-16 PROCEDURE — 71045 X-RAY EXAM CHEST 1 VIEW: CPT

## 2018-02-16 PROCEDURE — 81001 URINALYSIS AUTO W/SCOPE: CPT | Performed by: INTERNAL MEDICINE

## 2018-02-16 PROCEDURE — 85025 COMPLETE CBC W/AUTO DIFF WBC: CPT | Performed by: INTERNAL MEDICINE

## 2018-02-16 PROCEDURE — 36415 COLL VENOUS BLD VENIPUNCTURE: CPT | Performed by: INTERNAL MEDICINE

## 2018-02-16 PROCEDURE — 94640 AIRWAY INHALATION TREATMENT: CPT

## 2018-02-16 PROCEDURE — 6360000200 HC RX 636 W HCPCS (ALT 250 FOR IP): Performed by: INTERNAL MEDICINE

## 2018-02-16 PROCEDURE — 80048 BASIC METABOLIC PNL TOTAL CA: CPT | Performed by: INTERNAL MEDICINE

## 2018-02-16 PROCEDURE — 6370000100 HC RX 637 (ALT 250 FOR IP): Performed by: STUDENT IN AN ORGANIZED HEALTH CARE EDUCATION/TRAINING PROGRAM

## 2018-02-16 PROCEDURE — 97110 THERAPEUTIC EXERCISES: CPT | Mod: GP

## 2018-02-16 PROCEDURE — 97116 GAIT TRAINING THERAPY: CPT | Mod: GP

## 2018-02-16 PROCEDURE — (BLANK) HC ROOM ICU INTERMEDIATE

## 2018-02-16 RX ORDER — AMOXICILLIN 250 MG
2 CAPSULE ORAL NIGHTLY
Status: DISCONTINUED | OUTPATIENT
Start: 2018-02-16 | End: 2018-02-17 | Stop reason: HOSPADM

## 2018-02-16 RX ORDER — POLYETHYLENE GLYCOL 3350 17 G/17G
17 POWDER, FOR SOLUTION ORAL DAILY
Status: DISCONTINUED | OUTPATIENT
Start: 2018-02-16 | End: 2018-02-17 | Stop reason: HOSPADM

## 2018-02-16 RX ORDER — BISACODYL 5 MG
5 TABLET, DELAYED RELEASE (ENTERIC COATED) ORAL DAILY
Status: DISCONTINUED | OUTPATIENT
Start: 2018-02-16 | End: 2018-02-17 | Stop reason: HOSPADM

## 2018-02-16 RX ORDER — FUROSEMIDE 10 MG/ML
20 INJECTION INTRAMUSCULAR; INTRAVENOUS ONCE
Status: COMPLETED | OUTPATIENT
Start: 2018-02-16 | End: 2018-02-16

## 2018-02-16 RX ADMIN — LORATADINE 10 MG: 10 TABLET ORAL at 09:55

## 2018-02-16 RX ADMIN — ALPRAZOLAM 0.25 MG: 0.25 TABLET ORAL at 09:56

## 2018-02-16 RX ADMIN — ALPRAZOLAM 0.25 MG: 0.25 TABLET ORAL at 22:21

## 2018-02-16 RX ADMIN — BUDESONIDE AND FORMOTEROL FUMARATE DIHYDRATE 2 PUFF: 160; 4.5 AEROSOL RESPIRATORY (INHALATION) at 07:43

## 2018-02-16 RX ADMIN — ALPRAZOLAM 0.25 MG: 0.25 TABLET ORAL at 15:52

## 2018-02-16 RX ADMIN — METOPROLOL TARTRATE 25 MG: 25 TABLET ORAL at 22:23

## 2018-02-16 RX ADMIN — ENOXAPARIN SODIUM 40 MG: 40 INJECTION SUBCUTANEOUS at 15:52

## 2018-02-16 RX ADMIN — ACETAMINOPHEN 650 MG: 325 TABLET ORAL at 22:21

## 2018-02-16 RX ADMIN — DOXYCYCLINE 100 MG: 100 CAPSULE ORAL at 17:25

## 2018-02-16 RX ADMIN — FUROSEMIDE 20 MG: 10 INJECTION, SOLUTION INTRAMUSCULAR; INTRAVENOUS at 12:36

## 2018-02-16 RX ADMIN — MINERAL OIL AND WHITE PETROLATUM 1 APPLICATION: 150; 830 OINTMENT OPHTHALMIC at 09:57

## 2018-02-16 RX ADMIN — METOPROLOL TARTRATE 25 MG: 25 TABLET ORAL at 09:56

## 2018-02-16 RX ADMIN — LEVALBUTEROL 1.25 MG: 1.25 SOLUTION, CONCENTRATE RESPIRATORY (INHALATION) at 13:41

## 2018-02-16 RX ADMIN — LEVALBUTEROL 1.25 MG: 1.25 SOLUTION, CONCENTRATE RESPIRATORY (INHALATION) at 07:42

## 2018-02-16 RX ADMIN — IPRATROPIUM BROMIDE 0.5 MG: 0.5 SOLUTION RESPIRATORY (INHALATION) at 19:38

## 2018-02-16 RX ADMIN — GUAIFENESIN 600 MG: 600 TABLET, EXTENDED RELEASE ORAL at 22:22

## 2018-02-16 RX ADMIN — DOXYCYCLINE 100 MG: 100 CAPSULE ORAL at 06:48

## 2018-02-16 RX ADMIN — BISACODYL 5 MG: 5 TABLET, COATED ORAL at 09:56

## 2018-02-16 RX ADMIN — BENZONATATE 200 MG: 100 CAPSULE ORAL at 10:09

## 2018-02-16 RX ADMIN — ACETAMINOPHEN 650 MG: 325 TABLET ORAL at 10:03

## 2018-02-16 RX ADMIN — LEVALBUTEROL 1.25 MG: 1.25 SOLUTION, CONCENTRATE RESPIRATORY (INHALATION) at 19:38

## 2018-02-16 RX ADMIN — HYDROCORTISONE 1 APPLICATION: 1 CREAM TOPICAL at 10:04

## 2018-02-16 RX ADMIN — THEOPHYLLINE ANHYDROUS 400 MG: 200 CAPSULE, EXTENDED RELEASE ORAL at 09:56

## 2018-02-16 RX ADMIN — DOCUSATE SODIUM 100 MG: 100 CAPSULE, LIQUID FILLED ORAL at 09:56

## 2018-02-16 RX ADMIN — HYDROCORTISONE 1 APPLICATION: 1 CREAM TOPICAL at 21:00

## 2018-02-16 RX ADMIN — POLYETHYLENE GLYCOL (3350) 17 G: 17 POWDER, FOR SOLUTION ORAL at 09:56

## 2018-02-16 RX ADMIN — NYSTATIN 400000 UNITS: 100000 SUSPENSION ORAL at 09:57

## 2018-02-16 RX ADMIN — BENZONATATE 200 MG: 100 CAPSULE ORAL at 22:20

## 2018-02-16 RX ADMIN — ESOMEPRAZOLE MAGNESIUM 40 MG: 40 CAPSULE, DELAYED RELEASE ORAL at 15:52

## 2018-02-16 RX ADMIN — GUAIFENESIN 600 MG: 600 TABLET, EXTENDED RELEASE ORAL at 09:56

## 2018-02-16 RX ADMIN — IPRATROPIUM BROMIDE 0.5 MG: 0.5 SOLUTION RESPIRATORY (INHALATION) at 13:41

## 2018-02-16 RX ADMIN — ACETAMINOPHEN 650 MG: 325 TABLET ORAL at 02:45

## 2018-02-16 RX ADMIN — PREDNISONE 40 MG: 20 TABLET ORAL at 09:56

## 2018-02-16 RX ADMIN — DILTIAZEM HYDROCHLORIDE 240 MG: 240 CAPSULE, COATED, EXTENDED RELEASE ORAL at 09:55

## 2018-02-16 RX ADMIN — NYSTATIN 400000 UNITS: 100000 SUSPENSION ORAL at 17:26

## 2018-02-16 RX ADMIN — NYSTATIN 400000 UNITS: 100000 SUSPENSION ORAL at 22:25

## 2018-02-16 RX ADMIN — IPRATROPIUM BROMIDE 0.5 MG: 0.5 SOLUTION RESPIRATORY (INHALATION) at 07:42

## 2018-02-16 RX ADMIN — MONTELUKAST SODIUM 10 MG: 10 TABLET, FILM COATED ORAL at 22:21

## 2018-02-16 RX ADMIN — ROFLUMILAST 500 MCG: 500 TABLET ORAL at 12:38

## 2018-02-16 RX ADMIN — BUDESONIDE AND FORMOTEROL FUMARATE DIHYDRATE 2 PUFF: 160; 4.5 AEROSOL RESPIRATORY (INHALATION) at 19:38

## 2018-02-16 NOTE — INTERDISCIPLINARY/THERAPY
Lead Case Manager [phone]    Notified by Dr. Francis that the patient may be ready for discharge this weekend.  Reviewed the chart; the patient is back to baseline oxygen requirements.  Rounded on the patient; she is aware that she may discharge tomorrow.  She reports her family will pick her up and are planning on bringing her home oxygen.  She denies any discharge concerns; she feels she is back to her baseline which is mostly w/c bound related to her respiratory issues.

## 2018-02-16 NOTE — PLAN OF CARE
Problem: Respiratory - Adult  Goal: Achieves optimal ventilation and oxygenation  INTERVENTIONS:  1. Assess for changes in respiratory status  2. Assess for changes in mentation and behavior  3. Position to facilitate oxygenation and minimize respiratory effort  4. Oxygen supplementation based on oxygen saturation or ABGs  5. Assess patient's ability to cough effectively  6. Encourage broncho-pulmonary hygiene including cough, deep breathe  7. Assess the need for suctioning   8. Assess and instruct to report SOB or any respiratory difficulty  9. Respiratory Therapy support as indicated, including medications and treatment.   Outcome: Progressing   02/15/18 2007   Interventions Appropriate for this Patient   Achieves optimal ventilation and oxygenation Assess for changes in respiratory status;Assess for changes in mentation and behavior;Position to facilitate oxygenation and minimize respiratory effort;Oxygen supplementation based on oxygen saturation or arterial blood gases;Assess patient's ability to cough effectively;Encourage broncho-pulmonary hygiene including cough, deep breathe;Assess and instruct to report shortness of breath or any respiratory difficulty;Assess the need for suctioning;Respiratory Therapy support as indicated, including medications and treatment

## 2018-02-16 NOTE — PLAN OF CARE
Problem: Respiratory - Adult  Goal: Achieves optimal ventilation and oxygenation  INTERVENTIONS:  1. Assess for changes in respiratory status  2. Assess for changes in mentation and behavior  3. Position to facilitate oxygenation and minimize respiratory effort  4. Oxygen supplementation based on oxygen saturation or ABGs  5. Assess patient's ability to cough effectively  6. Encourage broncho-pulmonary hygiene including cough, deep breathe  7. Assess the need for suctioning   8. Assess and instruct to report SOB or any respiratory difficulty  9. Respiratory Therapy support as indicated, including medications and treatment.   Outcome: Progressing   02/16/18 1219   Interventions Appropriate for this Patient   Achieves optimal ventilation and oxygenation Assess for changes in respiratory status;Assess for changes in mentation and behavior;Position to facilitate oxygenation and minimize respiratory effort;Oxygen supplementation based on oxygen saturation or arterial blood gases;Assess patient's ability to cough effectively;Encourage broncho-pulmonary hygiene including cough, deep breathe;Assess and instruct to report shortness of breath or any respiratory difficulty;Respiratory Therapy support as indicated, including medications and treatment       Problem: Neurosensory - Adult  Goal: Achieves stable or improved neurological status  INTERVENTIONS  1. Assess for and report changes in neurological status  2. Initiate measures to prevent increased intracranial pressure  3. Maintain blood pressure and fluid volume within ordered parameters to optimize cerebral perfusion and minimize risk of hemorrhage  4. Monitor temperature, glucose, and sodium. Initiate appropriate interventions as ordered   Outcome: Progressing   02/16/18 1219   Interventions Appropriate for this Patient   Achieves stable or improved neurological status Assess for and report changes in neurological status;Monitor temperature, glucose, and sodium.  Initiate appropriate interventions as ordered       Problem: Skin/Tissue Integrity - Adult  Goal: Incisions, wounds, or drain sites healing without S/S of infection  INTERVENTIONS  1. Assess and document risk factors for skin breakdown  2. Assess and document skin integrity  3. Assess and document dressing/incision, wound bed, drain sites and surrounding tissue  4. Implement wound care per orders   Outcome: Progressing   02/16/18 1219   Interventions Appropriate for this Patient   Incision(s), wound(s) or drain site(s) healing without S/S of infection Assess and document skin integrity;Assess and document risk factors for skin breakdown;Assess and document dressing/incision, wound bed, drain sites and surrounding tissue       Problem: Infection Control  Goal: MINIMIZE THE ACQUISITION AND TRANSMISSION OF INFECTIOUS AGENTS  INTERVENTIONS:  1. Isolate patient with suspected/diagnosed communicable disease  2. Place on designated isolation precautions  3. Maintain isolation techniques  4. Perform hand hygiene before and after each patient care activity  5. [place] universal precautions  6. Wear PPE as directed for type of isolation  7. Administer antibiotic therapy as ordered  8. Clean the environment appropriately after each patient use  9. Clean patient care equipment after each patient use as it leaves the room  10. Limit number of visitors, as appropriate   Outcome: Progressing   02/16/18 1219   Interventions Appropriate for this Patient   MINIMIZE THE ACQUISITION AND TRANSMISSION OF INFECT AGENTS  Isolate patient with suspected/diagnosed communicable disease;Place on designated isolation precautions;Maintain isolation techniques;Perform hand hygiene before and after each patient care activity;[place] universal precautions;Administer antibiotic therapy as ordered;Wear PPE as directed for type of isolation;Clean the environment appropriately after each patient use;Clean patient care equipment after each patient  use as it leaves the room;Educate the patient/visitors on hand hygiene technique and need to complete upon entering/exiting the patient's room;Educate the patient/visitors on how to avoid the spread of infection       Problem: Grooming  Goal: STG - Patient completes grooming  In sitting EOB 8 minutes w/vitals WNL    Outcome: Progressing      Problem: TRANSFERS  Goal: STG - Patient will perform bed mobility  MOD(I)   Outcome: Progressing    Goal: STG - Patient will transfer from bed to chair  With MIN assist   Outcome: Progressing      Problem: Knowledge Deficit  Goal: Patient/family/caregiver demonstrates understanding of disease process, treatment plan, medications, and discharge instructions  INTERVENTIONS:   1. Complete learning assessment and assess knowledge base  2. Provide teaching at level of understanding   3. Provide teaching via preferred learning methods   Outcome: Progressing   02/16/18 1219   Interventions Appropriate for this Patient   Patient/family/caregiver demonstrates understanding of disease process, treatment plan, medications, and discharge instructions Complete learning assessment and assess knowledge base;Provide teaching at level of understanding;Provide teaching via preferred learning methods       Problem: Potential for Compromised Skin Integrity  Goal: Nutritional status is improving  INTERVENTIONS:  1. Monitor and assess patient for malnutrition (ex- brittle hair, bruises, dry skin, pale skin and conjunctiva, muscle wasting, smooth red tongue, and disorientation)  2. Monitor patient's weight and dietary intake as ordered or per policy  3. Determine patient's food preferences and provide high-protein, high-caloric foods as appropriate  4. Assist patient with eating   5. Allow adequate time for meals   6. Encourage patient to take dietary supplement as ordered   7. Collaborate with dietitian  8. Include patient/family/caregiver in decisions related to nutrition   Outcome: Progressing    02/16/18 1219   Interventions Appropriate for this Patient   Nutritional status is improving Monitor and assess patient for malnutrition (ex- brittle hair, bruises, dry skin, pale skin and conjunctiva, muscle wasting, smooth red tongue, and disorientation);Monitor patient's weight and dietary intake as ordered or per policy;Determine patient's food preferences and provide high-protein, high-caloric foods as appropriate;Assist patient with eating;Allow adequate time for meals;Encourage patient to take dietary supplement as ordered;Collaborate with dietitian;Include patient/family/caregiver in decisions related to nutrition     Goal: MOBILITY IS MAINTAINED OR IMPROVED  INTERVENTIONS  1. Collaborate with interdisciplinary team and initiate plan and interventions as ordered (PT/OT)  2. Encourage ambulation  3. Up to chair for meals  4. Monitor for signs of deconditioning   Outcome: Progressing   02/16/18 1219   Interventions Appropriate for this Patient   Mobility is Maintained or Improved Encourage ambulation;Up to chair for meals;Collaborate with interdisciplinary team and initiate plan and interventions as ordered (PT/OT)       Problem: Urinary Incontinence  Goal: Perineal skin integrity is maintained or improved  INTERVENTIONS:  1. Assess genitourinary system, perineal skin, labs (urinalysis), and history of incontinence to include past management, aggravating, and alleviating factors  2. Collaborate with interdisciplinary team including wound, ostomy, and continence nurse and initiate plans and interventions as needed  4. Consider urine containment device  5. Apply skin protectant   6. Develop skin care regimen  7. Provide privacy when changing patient's incontinence device to maintain their dignity   Outcome: Progressing   02/16/18 1219   Interventions Appropriate for this Patient   Perineal skin integrity is maintained or improved Assess genitourinary system, perineal skin, labs (urinalysis), and history of  incontinence to include past management, aggravating, and alleviating factors;Collaborate with interdisciplinary team including wound, ostomy, and continence nurse and initiate plans and interventions as needed;Apply skin protectant;Develop skin care regimen       Problem: Safety Adult - Fall  Goal: Free from fall injury  INTERVENTIONS:    Please reference Cares/Safety Flowsheet under Gu[facility]ume Fall Risk for interventions.   Outcome: Progressing

## 2018-02-16 NOTE — INTERDISCIPLINARY/THERAPY
02/16/18 0901   OT Last Visit   OT Received On 02/16/18   Subjective Comments   Subjective Comments Pt was agreeable to OT intervention, RN gave consent    General   Chart Reviewed Yes   OT Treatment Duration (Min) 9 Minutes   Grooming   Grooming Level of Assistance Setup   Grooming Where Assessed Bed level   Grooming Comments pt able to brush teeth (I)ly following set up    Bed Mobility 1   Bed Mobility Comments 1 pt was sitting EOB when I arrived for OT session    Transfer 1   Transfer From 1 Sit   Transfer Type 1 To   Transfer to 1 Stand;Chair with arms   Technique 1 Sit to stand   Transfer Device 1 (pt impulsively transfered (I)ly before a belt or FWW provide)   Other Comments   Comments Pt was left in the chair with the alarm on and with vc cue wait for help prior to tranfering   Assessment   Rehab Potential Good   Progress Slow progress, medical status limitations  (pt is SOB during activity )   Assessment Comment Pt is motivated to participate and is limited by SOB   Plan   Plan Comment G&H at sink

## 2018-02-16 NOTE — PLAN OF CARE
Problem: Respiratory - Adult  Goal: Achieves optimal ventilation and oxygenation  INTERVENTIONS:  1. Assess for changes in respiratory status  2. Assess for changes in mentation and behavior  3. Position to facilitate oxygenation and minimize respiratory effort  4. Oxygen supplementation based on oxygen saturation or ABGs  5. Assess patient's ability to cough effectively  6. Encourage broncho-pulmonary hygiene including cough, deep breathe  7. Assess the need for suctioning   8. Assess and instruct to report SOB or any respiratory difficulty  9. Respiratory Therapy support as indicated, including medications and treatment.   Outcome: Progressing   02/16/18 0746   Interventions Appropriate for this Patient   Achieves optimal ventilation and oxygenation Assess for changes in respiratory status;Assess for changes in mentation and behavior;Position to facilitate oxygenation and minimize respiratory effort;Oxygen supplementation based on oxygen saturation or arterial blood gases;Assess patient's ability to cough effectively;Encourage broncho-pulmonary hygiene including cough, deep breathe;Assess the need for suctioning;Assess and instruct to report shortness of breath or any respiratory difficulty;Respiratory Therapy support as indicated, including medications and treatment

## 2018-02-16 NOTE — PLAN OF CARE
Problem: Respiratory - Adult  Goal: Achieves optimal ventilation and oxygenation  INTERVENTIONS:  1. Assess for changes in respiratory status  2. Assess for changes in mentation and behavior  3. Position to facilitate oxygenation and minimize respiratory effort  4. Oxygen supplementation based on oxygen saturation or ABGs  5. Assess patient's ability to cough effectively  6. Encourage broncho-pulmonary hygiene including cough, deep breathe  7. Assess the need for suctioning   8. Assess and instruct to report SOB or any respiratory difficulty  9. Respiratory Therapy support as indicated, including medications and treatment.   Outcome: Progressing   02/15/18 2007   Interventions Appropriate for this Patient   Achieves optimal ventilation and oxygenation Assess for changes in respiratory status;Assess for changes in mentation and behavior;Position to facilitate oxygenation and minimize respiratory effort;Oxygen supplementation based on oxygen saturation or arterial blood gases;Assess patient's ability to cough effectively;Encourage broncho-pulmonary hygiene including cough, deep breathe;Assess and instruct to report shortness of breath or any respiratory difficulty;Assess the need for suctioning;Respiratory Therapy support as indicated, including medications and treatment       Problem: Neurosensory - Adult  Goal: Achieves stable or improved neurological status  INTERVENTIONS  1. Assess for and report changes in neurological status  2. Initiate measures to prevent increased intracranial pressure  3. Maintain blood pressure and fluid volume within ordered parameters to optimize cerebral perfusion and minimize risk of hemorrhage  4. Monitor temperature, glucose, and sodium. Initiate appropriate interventions as ordered   Outcome: Progressing   02/13/18 2103   Interventions Appropriate for this Patient   Achieves stable or improved neurological status Assess for and report changes in neurological status;Maintain blood  pressure and fluid volume within ordered parameters to optimize cerebral perfusion and minimize risk of hemorrhage;Monitor temperature, glucose, and sodium. Initiate appropriate interventions as ordered;Initiate measures to prevent increased intracranial pressure       Problem: Skin/Tissue Integrity - Adult  Goal: Incisions, wounds, or drain sites healing without S/S of infection  INTERVENTIONS  1. Assess and document risk factors for skin breakdown  2. Assess and document skin integrity  3. Assess and document dressing/incision, wound bed, drain sites and surrounding tissue  4. Implement wound care per orders   Outcome: Progressing   02/13/18 2103   Interventions Appropriate for this Patient   Incision(s), wound(s) or drain site(s) healing without S/S of infection Assess and document risk factors for skin breakdown;Assess and document dressing/incision, wound bed, drain sites and surrounding tissue;Implement wound care per orders;Assess and document skin integrity       Problem: Infection Control  Goal: MINIMIZE THE ACQUISITION AND TRANSMISSION OF INFECTIOUS AGENTS  INTERVENTIONS:  1. Isolate patient with suspected/diagnosed communicable disease  2. Place on designated isolation precautions  3. Maintain isolation techniques  4. Perform hand hygiene before and after each patient care activity  5. [place] universal precautions  6. Wear PPE as directed for type of isolation  7. Administer antibiotic therapy as ordered  8. Clean the environment appropriately after each patient use  9. Clean patient care equipment after each patient use as it leaves the room  10. Limit number of visitors, as appropriate   Outcome: Progressing   02/13/18 2103   Interventions Appropriate for this Patient   MINIMIZE THE ACQUISITION AND TRANSMISSION OF INFECT AGENTS  Isolate patient with suspected/diagnosed communicable disease;Place on designated isolation precautions;Perform hand hygiene before and after each patient care  activity;Maintain isolation techniques;[place] universal precautions;Clean patient care equipment after each patient use as it leaves the room;Clean the environment appropriately after each patient use;Wear PPE as directed for type of isolation;Administer antibiotic therapy as ordered;Educate the patient/visitors on hand hygiene technique and need to complete upon entering/exiting the patient's room;Limit number of visitors, as appropriate;Educate the patient/visitors on how to avoid the spread of infection       Problem: Knowledge Deficit  Goal: Patient/family/caregiver demonstrates understanding of disease process, treatment plan, medications, and discharge instructions  INTERVENTIONS:   1. Complete learning assessment and assess knowledge base  2. Provide teaching at level of understanding   3. Provide teaching via preferred learning methods   Outcome: Progressing   02/13/18 2103   Interventions Appropriate for this Patient   Patient/family/caregiver demonstrates understanding of disease process, treatment plan, medications, and discharge instructions Complete learning assessment and assess knowledge base;Provide teaching via preferred learning methods;Provide teaching at level of understanding       Problem: Potential for Compromised Skin Integrity  Goal: Skin Integrity is Maintained or Improved  INTERVENTIONS:  1. Assess and monitor skin integrity  2. Collaborate with interdisciplinary team and initiate plans and interventions as needed  3. Alternate a full bath with partial baths for elderly   4. Monitor patient's hygiene practices   5. Collaborate with wound, ostomy, and continence nurse   Outcome: Progressing   02/13/18 2103   Interventions Appropriate for this Patient   Skin integrity is maintained or improved Assess and monitor skin integrity;Collaborate with interdisciplinary team and initiate plans and interventions as needed;Monitor patient's hygiene practices;Collaborate with wound, ostomy, and  continence nurse     Goal: Nutritional status is improving  INTERVENTIONS:  1. Monitor and assess patient for malnutrition (ex- brittle hair, bruises, dry skin, pale skin and conjunctiva, muscle wasting, smooth red tongue, and disorientation)  2. Monitor patient's weight and dietary intake as ordered or per policy  3. Determine patient's food preferences and provide high-protein, high-caloric foods as appropriate  4. Assist patient with eating   5. Allow adequate time for meals   6. Encourage patient to take dietary supplement as ordered   7. Collaborate with dietitian  8. Include patient/family/caregiver in decisions related to nutrition   Outcome: Progressing   02/13/18 2103   Interventions Appropriate for this Patient   Nutritional status is improving Monitor and assess patient for malnutrition (ex- brittle hair, bruises, dry skin, pale skin and conjunctiva, muscle wasting, smooth red tongue, and disorientation);Determine patient's food preferences and provide high-protein, high-caloric foods as appropriate;Monitor patient's weight and dietary intake as ordered or per policy;Encourage patient to take dietary supplement as ordered;Collaborate with dietitian;Include patient/family/caregiver in decisions related to nutrition;Assist patient with eating;Allow adequate time for meals     Goal: MOBILITY IS MAINTAINED OR IMPROVED  INTERVENTIONS  1. Collaborate with interdisciplinary team and initiate plan and interventions as ordered (PT/OT)  2. Encourage ambulation  3. Up to chair for meals  4. Monitor for signs of deconditioning   Outcome: Not Progressing   02/13/18 2103   Interventions Appropriate for this Patient   Mobility is Maintained or Improved Collaborate with interdisciplinary team and initiate plan and interventions as ordered (PT/OT);Up to chair for meals;Monitor for signs of deconditioning;Encourage ambulation       Problem: Urinary Incontinence  Goal: Perineal skin integrity is maintained or  improved  INTERVENTIONS:  1. Assess genitourinary system, perineal skin, labs (urinalysis), and history of incontinence to include past management, aggravating, and alleviating factors  2. Collaborate with interdisciplinary team including wound, ostomy, and continence nurse and initiate plans and interventions as needed  4. Consider urine containment device  5. Apply skin protectant   6. Develop skin care regimen  7. Provide privacy when changing patient's incontinence device to maintain their dignity   Outcome: Progressing   02/13/18 2103   Interventions Appropriate for this Patient   Perineal skin integrity is maintained or improved Assess genitourinary system, perineal skin, labs (urinalysis), and history of incontinence to include past management, aggravating, and alleviating factors;Collaborate with interdisciplinary team including wound, ostomy, and continence nurse and initiate plans and interventions as needed;Consider urine containment device;Apply skin protectant;Provide privacy when changing patient's incontinence device to maintain their dignity;Develop skin care regimen       Problem: Safety Adult - Fall  Goal: Free from fall injury  INTERVENTIONS:    Please reference Cares/Safety Flowsheet under [facility] Fall Risk for interventions.   Outcome: Progressing

## 2018-02-16 NOTE — INTERDISCIPLINARY/THERAPY
02/16/18 1300   PT Last Visit   PT Received On 02/16/18   General   Is this a Co-Treatment? No   Additional Pertinent History SPT to/from W/C at home   Family/Caregiver Present No   Precautions   Other Precautions High O2 needs, unstable heart rate   Subjective Comments   Subjective Comments NSG permitted therapy today. Pt agrees and is sitting up in bed upon arrival. Pt returns to sitting in bed upon end of treatment.   Pain Assessment   Pain Assessment 0-10   Pain Score 9   Pain Location Knee   Pain Orientation Right;Left   Pain Radiating Towards Arthritc pain   Cognition   Overall Cognitive Status WFL   Balance   Balance Impaired   Standing   Standing-Exercise Comments Stand ther ex marches, heel raises, hip flex, knee flex, hip abd x5 bilat    Bed Mobility 1   Level of Assistance 1 Independent   Transfer 1   Transfer From 1 Sit   Transfer Type 1 To and from   Transfer to 1 Stand   Level of Assistance 1 Standby assistance   Trials/Comments 1 multiple stand for mulstiple rest breaks during ther ex   Assessment   Rehab Potential Fair   Progress Slow progress, decreased activity tolerance   Problem List Comments SOB limiting all mobility   Assessment Comment Pt tolerated standing ther ex fair with appropriate rest breaks. O2 remained 87% and above on 5.0L O2 during activity. O2 returned to 4.0 as it was when PT arrived.   Plan   Treatment Interventions Therapeutic exercises;Gait training;Functional transfer training   PT Frequency 3-5x/wk   Plan Comment Txfrs/gait/ther ex to increase activity vi

## 2018-02-16 NOTE — PROGRESS NOTES
Hospitalist (Internal Medicine) Daily Progress Note:   LOS: 8 days     Subjective      No significant overnight issues noted.  Chart reviewed and patient was evaluated at bedside in presence of nursing staff.  Patient is resting in chair and still appears mildly anxious.  She is now on 4 L oxygen Via nasal cannula and reports further improvement in shortness of breath, and chest congestion.  She reports he still having some wheeze, cough with sputum production, chest congestion, but denies any chest pain, palpitation, dizziness or diaphoresis.  Noted improving sinus tachycardia on telemetry.      Objective     Vital signs:  Temp:  [36.3 °C (97.3 °F)-36.9 °C (98.4 °F)] 36.5 °C (97.7 °F)  Heart Rate:  [] 99  Resp:  [20-24] 20  BP: (112-143)/(61-83) 112/74    Intake/Output Summary (Last 24 hours) at 02/16/18 0932  Last data filed at 02/16/18 0600   Gross per 24 hour   Intake              720 ml   Output             1300 ml   Net             -580 ml       Physical Exam  Denita is resting in bed, appears mildly anxious  HEENT: No pallor, no JVD, On 8 L > 4L oxygen .  Chest: Improving crackles both lungs.  Further improved expiratory wheeze.  CVS: Regular, sinus tachycardia at 100s, no murmurs.  Abdomen: Soft,  bowel sounds present.  Extremities: No edema.      Results from last 7 days  Lab Units 02/16/18  0550 02/15/18  0625 02/14/18  0431  02/13/18  0725 02/12/18  0409   WBC AUTO 10*3/uL 11.8* 11.2* 12.3*  < >  --  13.1*   HEMOGLOBIN g/dL 12.0 12.2 12.4  < >  --  13.0   HEMATOCRIT % 37.2 37.7 37.8  < >  --  39.8   PLATELETS AUTO 10*3/uL 247 248 239  < >  --  244   SODIUM mmol/L 142 144 142  --  142 142   POTASSIUM mmol/L 4.4 3.9 4.2  --  4.1 3.9   CHLORIDE mmol/L 100 101 99  --  100 98   CO2 mmol/L 36* 37* 37*  --  33* 34*   BUN mg/dL 28* 18 14  --  15 17   CREATININE mg/dL 0.7 0.6 0.6  --  0.7 0.6   GLUCOSE mg/dL 84 90 127*  --  161* 161*   CALCIUM mg/dL 9.7 9.8 9.7  --  9.8 9.3   MAGNESIUM mg/dL  --   --  2.2  --   2.1 2.2   PHOSPHORUS mg/dL  --   --  3.8  --  3.1 4.3   < > = values in this interval not displayed.    Results from last 7 days  Lab Units 02/14/18  0431 02/13/18  0725 02/12/18  0409   ALBUMIN g/dL 3.4* 3.6 3.5   TSH uIU/ml 0.205*  --   --    FREE T4 ng/dL 0.97  --   --          Assessment/Plan   Principal Problem:    COPD with acute exacerbation (CMS/[facility])  Active Problems:    Acute bronchiolitis due to respiratory syncytial virus (RSV)    Acute-on-chronic respiratory failure (CMS/[facility])    Bronchitis    Chronic obstructive lung disease (CMS/[facility])    Obesity (BMI 30.0-34.9)    Plan:   Patient is 57 years obese lady with oxygen dependent COPD, transferred out of ICU after management of acute bronchiolitis due to respiratory symptoms of virus causing acute COPD exacerbation and acute on chronic hypoxic respiratory failure and sepsis secondary to likely bacterial infection.  She was treated with steroids, BiPAP, nebulizers in the ICU with some symptomatic improvement.  Telemetry shows sinus tachycardia and echocardiogram with 60% EF, no other  Significant abnormalities..  · Pulmonary Dr. Esqueda's input appreciated in management of acute and chronic hypoxic respiratory failure from RSV infection with bronchiolitis with secondary bacterial infection and sepsis.  Continue with BiPAP as per pulmonary.    · Continue on prednisone with slow tapering, loratadine, guaifenesin, Tessalon.  Also continue doxycycline for next 4 days for potential secondary bacterial infection.  Continue with nebulizers, oxygen supplement and other supportive care.  Continue with Singulair.  Continue with incentive spirometry and wean the oxygen as able.  · Continue calamine lotion, loratadine/prednisone.  She is allergic to diphenhydramine.  · Her leukocytosis appears gradually improving.  Will continue to closely monitor.  · Continue the telemetry for now.  Continue the pain medications  · Patient has sinus tachycardia likely related to sepsis,  hypoxia and use of beta-2 agonist.  Her TSH is suppressed but her T3 and T4 is within normal limit and theophylline level is within normal limits.  Continue diltiazem CD and  metoprolol for rate control.  Continue with hydralazine as needed for hypertension.  · Continue with Lasix and fluid restriction for possible pulmonary edema.  · Continue the PT/OT.  Activities as tolerated    DVT Prophylaxis: Lovenox    Code Status: Full Code    Anticipated date of discharge:  2/17/18    Further plan of care has been discussed with the patient and nursing staff at bedside.     Time spent:  31 minutes in direct patient care and coordination.    Dragon voice recognition program was used to aid in this documentation which might generate inaccuracies despite efforts made to avoid them. Please take it into context and contact the provider if areas of error are identified.           RG PIERCE MD.   Hospitalist,   [facility].

## 2018-02-16 NOTE — PROGRESS NOTES
Date of Service: 2/16/2018    Time of Service: 9:11 AM    Patient name: Denita Spring  MRN: [id number]   LOS: 8 days     Subjective   Patient seen and examined. Discussed with nurse. Chart reviewed.  Patient reports feeling improved this a.m., breathing is better, denies chest pain    Review of Systems  GEN: NAD  CV: denies chest pain  Pulm: Improving dyspnea  Abd: denies abd pain, n/v  Neuro: denies weakness  HEENT: denies visual disturbance  Ext: denies pain  Skin: denies rashes  MSK: denies pain      Objective     Temp:  [36.3 °C (97.3 °F)-36.9 °C (98.4 °F)] 36.5 °C (97.7 °F)  Heart Rate:  [] 99  Resp:  [20-24] 20  BP: (112-143)/(61-83) 112/74    I/O last 3 completed shifts:  In: 920 [P.O.:920]  Out: 2750 [Urine:2750]    No intake/output data recorded.    Physical Exam  Gen: NAD  HEENT: Atraumatic  Mouth: MMM  CV: +s1 +s2, tachycardic  Pulm: CTA B/L, no wheezing  Abd: +bs soft nt nd  Neuro: AAO, moves all ext  Skin: No rashes  Vasc: +pulses in b/l ue  Psych: appears comfortable      Medications:     Current Facility-Administered Medications:   •  acetaminophen (TYLENOL) tablet 325-650 mg, 325-650 mg, oral, q6h PRN, Yajaira Velasquez DO, 650 mg at 02/16/18 0245  •  ALPRAZolam (XANAX) tablet 0.25 mg, 0.25 mg, oral, 3x daily, Jose Short MD, 0.25 mg at 02/15/18 1905  •  ALPRAZolam (XANAX) tablet 0.25 mg, 0.25 mg, oral, Nightly PRN, Jose Short MD, 0.25 mg at 02/12/18 4425  •  alum-mag hydroxide-simeth (MAALOX ADVANCED) suspension 30 mL, 30 mL, oral, 3x daily PRN, Jose Koenig MD  •  benzonatate (TESSALON) capsule 200 mg, 200 mg, oral, 3x daily PRN, Cooper Francis MD  •  bisacodyl (DULCOLAX) EC tablet 5 mg, 5 mg, oral, Daily, Cooper Francis MD  •  bisacodyl (DULCOLAX) suppository 10 mg, 10 mg, rectal, Daily PRN, Jose Koenig MD  •  budesonide-formoterol (SYMBICORT) 160-4.5 mcg/actuation inhaler 2 puff, 2 puff, inhalation, 2x daily, Jonathan Esqueda MD, 2 puff at 02/16/18 0743  •  calamine-zinc oxide lotion 1  application, 1 application, Topical, PRN, Cooper Francis MD  •  diltiazem CD (CARDIZEM CD) 24 hr capsule 240 mg, 240 mg, oral, Daily, Cooper Francis MD, 240 mg at 02/15/18 1824  •  docusate sodium (COLACE) capsule 100 mg, 100 mg, oral, 2x daily, oJse Koenig MD, 100 mg at 02/15/18 2139  •  doxycycline (VIBRAMYCIN) capsule 100 mg, 100 mg, oral, 2x daily before meals, Cooper Francis MD, 100 mg at 02/16/18 0648  •  enoxaparin (LOVENOX) syringe 40 mg, 40 mg, subcutaneous, Daily at 1400, Jose Koenig MD, 40 mg at 02/15/18 1822  •  esomeprazole (NexIUM) capsule 40 mg, 40 mg, oral, Daily at 1600, Jose Short MD, 40 mg at 02/15/18 1822  •  guaiFENesin (MUCINEX) 12 hr tablet 600 mg, 600 mg, oral, 2x daily, Cooper Francis MD, 600 mg at 02/15/18 2140  •  haloperidol lactate (HALDOL) injection 5 mg, 5 mg, intravenous, q6h PRN, Raymond Blackmon MD, 5 mg at 02/09/18 1405  •  hydrALAZINE (APRESOLINE) injection 10 mg, 10 mg, intravenous, q4h PRN, Cooper Francis MD  •  hydrocortisone 1 % cream, , Topical, 2x daily, Cooper Francis MD, 1 application at 02/15/18 2100  •  inhalational spacing device spacer, , , ,   •  ipratropium (ATROVENT) 0.02 % nebulizer solution 0.5 mg, 0.5 mg, nebulization, 3x daily, [place] DO, 0.5 mg at 02/16/18 0742  •  levalbuterol (XOPENEX) 1.25 mg/0.5 mL nebulizer solution 1.25 mg, 1.25 mg, nebulization, q2h PRN, Raymond Blackmon MD, 1.25 mg at 02/11/18 1132  •  levalbuterol (XOPENEX) 1.25 mg/0.5 mL nebulizer solution 1.25 mg, 1.25 mg, nebulization, 3x daily, Jose Short MD, 1.25 mg at 02/16/18 0742  •  loratadine (CLARITIN) tablet 10 mg, 10 mg, oral, Daily, Cooper Francis MD, 10 mg at 02/15/18 0818  •  magnesium hydroxide (MILK OF MAGNESIA) 400 mg/5 mL oral suspension 30 mL, 30 mL, oral, Daily PRN, Jose Koenig MD  •  magnesium sulfate 2 g in SWFI 50 mL IVPB (premix), 2 g, intravenous, Once PRN, [place] DO  •  metoprolol tartrate (LOPRESSOR) tablet 25 mg, 25 mg, oral, 2x daily,  Cooper Francis MD, 25 mg at 02/15/18 2139  •  montelukast (SINGULAIR) tablet 10 mg, 10 mg, oral, Nightly, Cooper Francis MD, 10 mg at 02/15/18 2142  •  nystatin (MYCOSTATIN) 100,000 unit/mL suspension 400,000 Units, 400,000 Units, oral, 4x daily, Yajaira Velasquez DO, 400,000 Units at 02/15/18 2142  •  ondansetron (ZOFRAN) tablet 4 mg, 4 mg, oral, q6h PRN **OR** ondansetron (ZOFRAN) injection 4 mg, 4 mg, intravenous, q6h PRN, Jose Koenig MD  •  polyethylene glycol (GLYCOLAX) packet 17 g, 17 g, oral, Daily, Cooper Francis MD  •  predniSONE (DELTASONE) tablet 40 mg, 40 mg, oral, Daily, Cooper Francis MD, 40 mg at 02/15/18 0819  •  roflumilast (DALIRESP) tablet 500 mcg, 500 mcg, oral, Daily with lunch, Jose Short MD, 500 mcg at 02/15/18 1128  •  sennosides-docusate sodium (PERICOLACE) 8.6-50 mg 2 tablet, 2 tablet, oral, Nightly, Cooper Francis MD  •  Insert peripheral IV, , , Once **AND** Maintain IV access, , , Until discontinued **AND** Saline lock IV, , , Once **AND** sodium chloride flush 3 mL, 3 mL, intravenous, PRN, Jose Koenig MD  •  sodium chloride-aloe vera (AYR) nasal gel 1 application, 1 application, Each Nostril, PRN, [place] DO  •  theophylline (JAZMYNE-24) 24 hr capsule 400 mg, 400 mg, oral, Daily, Jose Short MD, 400 mg at 02/15/18 0818  •  traZODone (DESYREL) tablet 25 mg, 25 mg, oral, Nightly PRN, Jose Koenig MD, 25 mg at 02/15/18 2141  •  white petrolatum-mineral oil (LACRILUBE) ophthalmic ointment 1 application, 1 application, Both Eyes, 2x daily, [place] DO, 1 application at 02/14/18 1450  •  white petrolatum-mineral oil (LACRILUBE) ophthalmic ointment 1 application, 1 application, Both Eyes, PRN, [place] DO    Admission on 02/08/2018   No results displayed because visit has over 200 results.            Assessment/Plan   Assessment:  Principal Problem:    COPD with acute exacerbation (CMS/HCC)  Active Problems:    Acute-on-chronic respiratory failure (CMS/HCC)     Bronchitis    Chronic obstructive lung disease (CMS/HCC)    Obesity (BMI 30.0-34.9)    Acute bronchiolitis due to respiratory syncytial virus (RSV)    COPD exacerbation, FEV1 of 0.5 L or 19% predicted  RSV  Acute on chronic hypoxemic respiratory failure, on 4 L at home       Continue 40 mg of prednisone, complete 5 days of therapy  Continue Symbicort, Xopenex and ipratropium.  Theophylline level not elevated, TSH was elevated however free T4 is normal.  These are not the driving factors in her tachycardia  Hypoxemia is improving, titrate down oxygen and increase her mobility, down to 4 L today which is her baseline  On doxycycline, she can complete a 7 day course, no obvious bacterial source of infection currently  Continue aggressive pulmonary hygiene  Xanax is helping with anxiety    DVT prophylaxis    We will arrange for pulmonary follow-up at the time of discharge    Electronically signed by: ALEM MARTINEZ MD  2/16/2018  9:11 AM

## 2018-02-17 VITALS
TEMPERATURE: 97 F | OXYGEN SATURATION: 93 % | HEART RATE: 77 BPM | BODY MASS INDEX: 30.39 KG/M2 | HEIGHT: 63 IN | SYSTOLIC BLOOD PRESSURE: 108 MMHG | DIASTOLIC BLOOD PRESSURE: 63 MMHG | WEIGHT: 171.52 LBS | RESPIRATION RATE: 20 BRPM

## 2018-02-17 PROCEDURE — 6360000200 HC RX 636 W HCPCS (ALT 250 FOR IP): Performed by: INTERNAL MEDICINE

## 2018-02-17 PROCEDURE — 94640 AIRWAY INHALATION TREATMENT: CPT

## 2018-02-17 PROCEDURE — 2590000100 HC RX 259: Performed by: INTERNAL MEDICINE

## 2018-02-17 PROCEDURE — 6370000100 HC RX 637 (ALT 250 FOR IP): Performed by: INTERNAL MEDICINE

## 2018-02-17 PROCEDURE — 99232 SBSQ HOSP IP/OBS MODERATE 35: CPT | Performed by: INTERNAL MEDICINE

## 2018-02-17 PROCEDURE — 99239 HOSP IP/OBS DSCHRG MGMT >30: CPT | Performed by: INTERNAL MEDICINE

## 2018-02-17 RX ORDER — DILTIAZEM HYDROCHLORIDE 240 MG/1
240 CAPSULE, COATED, EXTENDED RELEASE ORAL DAILY
Qty: 30 CAPSULE | Refills: 0 | Status: ON HOLD | OUTPATIENT
Start: 2018-02-18 | End: 2023-06-22 | Stop reason: DRUGHIGH

## 2018-02-17 RX ORDER — ALPRAZOLAM 0.25 MG/1
0.25 TABLET ORAL NIGHTLY PRN
Qty: 15 TABLET | Refills: 0 | Status: SHIPPED | OUTPATIENT
Start: 2018-02-17 | End: 2018-02-27

## 2018-02-17 RX ORDER — PREDNISONE 10 MG/1
TABLET ORAL
Qty: 18 TABLET | Refills: 0 | Status: ON HOLD | OUTPATIENT
Start: 2018-02-17 | End: 2018-07-24 | Stop reason: ALTCHOICE

## 2018-02-17 RX ORDER — DOXYCYCLINE 100 MG/1
100 CAPSULE ORAL
Qty: 8 CAPSULE | Refills: 0 | Status: SHIPPED | OUTPATIENT
Start: 2018-02-17 | End: 2018-02-21

## 2018-02-17 RX ORDER — GUAIFENESIN 600 MG/1
600 TABLET, EXTENDED RELEASE ORAL 2 TIMES DAILY
Qty: 60 TABLET | Refills: 0 | Status: ON HOLD | OUTPATIENT
Start: 2018-02-17 | End: 2018-07-24 | Stop reason: DRUGHIGH

## 2018-02-17 RX ORDER — NYSTATIN 100000 [USP'U]/ML
400000 SUSPENSION ORAL 4 TIMES DAILY
Qty: 300 ML | Refills: 0 | Status: ON HOLD | OUTPATIENT
Start: 2018-02-17 | End: 2023-06-22

## 2018-02-17 RX ORDER — METOPROLOL TARTRATE 25 MG/1
25 TABLET, FILM COATED ORAL 2 TIMES DAILY
Qty: 60 TABLET | Refills: 0 | Status: SHIPPED | OUTPATIENT
Start: 2018-02-17 | End: 2021-04-02 | Stop reason: ALTCHOICE

## 2018-02-17 RX ADMIN — DOCUSATE SODIUM 100 MG: 100 CAPSULE, LIQUID FILLED ORAL at 09:03

## 2018-02-17 RX ADMIN — BISACODYL 5 MG: 5 TABLET, COATED ORAL at 09:03

## 2018-02-17 RX ADMIN — GUAIFENESIN 600 MG: 600 TABLET, EXTENDED RELEASE ORAL at 09:04

## 2018-02-17 RX ADMIN — DILTIAZEM HYDROCHLORIDE 240 MG: 240 CAPSULE, COATED, EXTENDED RELEASE ORAL at 09:03

## 2018-02-17 RX ADMIN — ACETAMINOPHEN 650 MG: 325 TABLET ORAL at 09:03

## 2018-02-17 RX ADMIN — ROFLUMILAST 500 MCG: 500 TABLET ORAL at 12:00

## 2018-02-17 RX ADMIN — LEVALBUTEROL 1.25 MG: 1.25 SOLUTION, CONCENTRATE RESPIRATORY (INHALATION) at 08:15

## 2018-02-17 RX ADMIN — LORATADINE 10 MG: 10 TABLET ORAL at 09:03

## 2018-02-17 RX ADMIN — PREDNISONE 40 MG: 20 TABLET ORAL at 09:03

## 2018-02-17 RX ADMIN — METOPROLOL TARTRATE 25 MG: 25 TABLET ORAL at 09:03

## 2018-02-17 RX ADMIN — BUDESONIDE AND FORMOTEROL FUMARATE DIHYDRATE 2 PUFF: 160; 4.5 AEROSOL RESPIRATORY (INHALATION) at 08:15

## 2018-02-17 RX ADMIN — HYDROCORTISONE 1 APPLICATION: 1 CREAM TOPICAL at 09:05

## 2018-02-17 RX ADMIN — DOXYCYCLINE 100 MG: 100 CAPSULE ORAL at 06:56

## 2018-02-17 RX ADMIN — NYSTATIN 400000 UNITS: 100000 SUSPENSION ORAL at 09:04

## 2018-02-17 RX ADMIN — MINERAL OIL AND WHITE PETROLATUM 1 APPLICATION: 150; 830 OINTMENT OPHTHALMIC at 09:05

## 2018-02-17 RX ADMIN — THEOPHYLLINE ANHYDROUS 400 MG: 200 CAPSULE, EXTENDED RELEASE ORAL at 09:03

## 2018-02-17 RX ADMIN — ALPRAZOLAM 0.25 MG: 0.25 TABLET ORAL at 09:03

## 2018-02-17 RX ADMIN — IPRATROPIUM BROMIDE 0.5 MG: 0.5 SOLUTION RESPIRATORY (INHALATION) at 08:15

## 2018-02-17 RX ADMIN — BENZONATATE 200 MG: 100 CAPSULE ORAL at 09:03

## 2018-02-17 RX ADMIN — NYSTATIN 400000 UNITS: 100000 SUSPENSION ORAL at 13:00

## 2018-02-17 NOTE — DISCHARGE INSTRUCTIONS
· Continue with the doxycycline for next 4 days and prednisone tapering as recommended.  · Continue with nebulizers and other inhalers as recommended.  · Continue with oxygen supplement 4 L/min as before.  · May continue with Xanax as needed for anxiety.  · Continue with metoprolol and diltiazem for tachycardia.  May discontinue them if you develop hypotension.  · Follow-up with the primary care doctor in a week.  · Follow-up with pulmonary clinic in 2-3 weeks.

## 2018-02-17 NOTE — PROGRESS NOTES
Date of Service: 2/17/2018    Time of Service: 10:52 AM    Patient name: Denita Spring  MRN: [id number]   LOS: 9 days     Subjective   Patient seen and examined. Discussed with nurse. Chart reviewed.  Patient reports breathing is at baseline, denies chest pain    Review of Systems  GEN: NAD  CV: denies chest pain  Pulm: Improving dyspnea  Abd: denies abd pain, n/v  Neuro: denies weakness  HEENT: denies visual disturbance  Ext: denies pain  Skin: denies rashes  MSK: denies pain      Objective     Temp:  [36.3 °C (97.3 °F)-37 °C (98.6 °F)] 36.3 °C (97.3 °F)  Heart Rate:  [] 90  Resp:  [18-20] 18  BP: (104-124)/(62-77) 124/77    I/O last 3 completed shifts:  In: 1240 [P.O.:1240]  Out: 2000 [Urine:2000]    No intake/output data recorded.    Physical Exam  Gen: NAD  HEENT: Atraumatic  Mouth: MMM  CV: +s1 +s2, tachycardic  Pulm: CTA B/L, no wheezing  Abd: +bs soft nt nd  Neuro: AAO, moves all ext  Skin: No rashes  Vasc: +pulses in b/l ue  Psych: appears comfortable      Medications:     Current Facility-Administered Medications:   •  acetaminophen (TYLENOL) tablet 325-650 mg, 325-650 mg, oral, q6h PRN, Yajaira Velasquez DO, 650 mg at 02/17/18 0903  •  ALPRAZolam (XANAX) tablet 0.25 mg, 0.25 mg, oral, 3x daily, Jose Short MD, 0.25 mg at 02/17/18 0903  •  ALPRAZolam (XANAX) tablet 0.25 mg, 0.25 mg, oral, Nightly PRN, Jose Short MD, 0.25 mg at 02/12/18 0415  •  alum-mag hydroxide-simeth (MAALOX ADVANCED) suspension 30 mL, 30 mL, oral, 3x daily PRN, Jose Koenig MD  •  benzonatate (TESSALON) capsule 200 mg, 200 mg, oral, 3x daily PRN, Cooper Francis MD, 200 mg at 02/17/18 0903  •  bisacodyl (DULCOLAX) EC tablet 5 mg, 5 mg, oral, Daily, Cooper Francis MD, 5 mg at 02/17/18 0903  •  bisacodyl (DULCOLAX) suppository 10 mg, 10 mg, rectal, Daily PRN, Jose Koenig MD  •  budesonide-formoterol (SYMBICORT) 160-4.5 mcg/actuation inhaler 2 puff, 2 puff, inhalation, 2x daily, Jonathan Esqueda MD, 2 puff at 02/17/18 0815  •   calamine-zinc oxide lotion 1 application, 1 application, Topical, PRN, Cooper Francis MD  •  diltiazem CD (CARDIZEM CD) 24 hr capsule 240 mg, 240 mg, oral, Daily, Cooper Francis MD, 240 mg at 02/17/18 0903  •  docusate sodium (COLACE) capsule 100 mg, 100 mg, oral, 2x daily, Jose Koenig MD, 100 mg at 02/17/18 0903  •  doxycycline (VIBRAMYCIN) capsule 100 mg, 100 mg, oral, 2x daily before meals, Cooper Francis MD, 100 mg at 02/17/18 0656  •  enoxaparin (LOVENOX) syringe 40 mg, 40 mg, subcutaneous, Daily at 1400, Jose Koenig MD, 40 mg at 02/16/18 1552  •  esomeprazole (NexIUM) capsule 40 mg, 40 mg, oral, Daily at 1600, Jose Short MD, 40 mg at 02/16/18 1552  •  guaiFENesin (MUCINEX) 12 hr tablet 600 mg, 600 mg, oral, 2x daily, Cooper Francis MD, 600 mg at 02/17/18 0904  •  haloperidol lactate (HALDOL) injection 5 mg, 5 mg, intravenous, q6h PRN, Raymond Blackmon MD, 5 mg at 02/09/18 1405  •  hydrALAZINE (APRESOLINE) injection 10 mg, 10 mg, intravenous, q4h PRN, Cooper Francis MD  •  hydrocortisone 1 % cream, , Topical, 2x daily, Cooper Francis MD, 1 application at 02/17/18 0905  •  inhalational spacing device spacer, , , ,   •  ipratropium (ATROVENT) 0.02 % nebulizer solution 0.5 mg, 0.5 mg, nebulization, 3x daily, [place] DO, 0.5 mg at 02/17/18 0815  •  levalbuterol (XOPENEX) 1.25 mg/0.5 mL nebulizer solution 1.25 mg, 1.25 mg, nebulization, q2h PRN, Raymond Blackmon MD, 1.25 mg at 02/11/18 1132  •  levalbuterol (XOPENEX) 1.25 mg/0.5 mL nebulizer solution 1.25 mg, 1.25 mg, nebulization, 3x daily, Jose Short MD, 1.25 mg at 02/17/18 0815  •  loratadine (CLARITIN) tablet 10 mg, 10 mg, oral, Daily, Cooper Francis MD, 10 mg at 02/17/18 0903  •  magnesium hydroxide (MILK OF MAGNESIA) 400 mg/5 mL oral suspension 30 mL, 30 mL, oral, Daily PRN, Jose Koenig MD  •  magnesium sulfate 2 g in SWFI 50 mL IVPB (premix), 2 g, intravenous, Once PRN, [place] DO  •  metoprolol tartrate (LOPRESSOR) tablet 25  mg, 25 mg, oral, 2x daily, Cooper Francis MD, 25 mg at 02/17/18 0903  •  montelukast (SINGULAIR) tablet 10 mg, 10 mg, oral, Nightly, Cooper Francis MD, 10 mg at 02/16/18 2221  •  nystatin (MYCOSTATIN) 100,000 unit/mL suspension 400,000 Units, 400,000 Units, oral, 4x daily, Yajaira Velasquez DO, 400,000 Units at 02/17/18 0904  •  ondansetron (ZOFRAN) tablet 4 mg, 4 mg, oral, q6h PRN **OR** ondansetron (ZOFRAN) injection 4 mg, 4 mg, intravenous, q6h PRN, Jose Koenig MD  •  polyethylene glycol (GLYCOLAX) packet 17 g, 17 g, oral, Daily, Cooper Francis MD, 17 g at 02/16/18 0956  •  predniSONE (DELTASONE) tablet 40 mg, 40 mg, oral, Daily, Cooper Francis MD, 40 mg at 02/17/18 0903  •  roflumilast (DALIRESP) tablet 500 mcg, 500 mcg, oral, Daily with lunch, Jose Short MD, 500 mcg at 02/16/18 1238  •  sennosides-docusate sodium (PERICOLACE) 8.6-50 mg 2 tablet, 2 tablet, oral, Nightly, Cooper Francis MD  •  Insert peripheral IV, , , Once **AND** Maintain IV access, , , Until discontinued **AND** Saline lock IV, , , Once **AND** sodium chloride flush 3 mL, 3 mL, intravenous, PRN, Jose Koenig MD  •  sodium chloride-aloe vera (AYR) nasal gel 1 application, 1 application, Each Nostril, PRN, [place] DO  •  theophylline (JAZMYNE-24) 24 hr capsule 400 mg, 400 mg, oral, Daily, Jose Short MD, 400 mg at 02/17/18 0903  •  traZODone (DESYREL) tablet 25 mg, 25 mg, oral, Nightly PRN, Jose Koenig MD, 25 mg at 02/15/18 2141  •  white petrolatum-mineral oil (LACRILUBE) ophthalmic ointment 1 application, 1 application, Both Eyes, 2x daily, [place] DO, 1 application at 02/17/18 0905  •  white petrolatum-mineral oil (LACRILUBE) ophthalmic ointment 1 application, 1 application, Both Eyes, PRN, [place] DO    Admission on 02/08/2018   No results displayed because visit has over 200 results.            Assessment/Plan   Assessment:  Principal Problem:    COPD with acute exacerbation (CMS/[facility])  Active Problems:     Acute-on-chronic respiratory failure (CMS/HCC)    Bronchitis    Chronic obstructive lung disease (CMS/HCC)    Obesity (BMI 30.0-34.9)    Acute bronchiolitis due to respiratory syncytial virus (RSV)    COPD exacerbation, FEV1 of 0.5 L or 19% predicted  RSV  Acute on chronic hypoxemic respiratory failure, on 4 L at home       Continue 40 mg of prednisone, complete 5 days of therapy  she was using Advair at home, she can use either the Advair or the Symbicort at the time of discharge, which ever is her preference.  She can resume Spiriva, and as needed albuterol  Theophylline level not elevated, TSH was elevated however free T4 is normal.  These are not the driving factors in her tachycardia  Titrated down to her home oxygen level of 4 L.  On doxycycline, she can complete a 7 day course, no obvious bacterial source of infection currently    DVT prophylaxis    She will need pulmonary follow-up in 2-3 weeks post discharge, this will be arranged through our pulmonary clinic.  Pulmonary will sign off, please feel free to call with questions    Electronically signed by: ALEM MARTINEZ MD  2/17/2018  10:52 AM

## 2018-02-17 NOTE — NURSING END OF SHIFT
Nursing End of Shift Summary    Goals:  Clinical Goals for the Shift: Reduced oxygen needs    Narrative Summary of Progress Towards Clinical Goals:  Patient did well with her O2 sats until 0430 when she desatted to low 80s.  Patient's O2 turned up to 5.    Barriers for Transfer or Discharge: patient is getting stronger and O2 needs are improving

## 2018-02-17 NOTE — PLAN OF CARE
Problem: Respiratory - Adult  Goal: Achieves optimal ventilation and oxygenation  INTERVENTIONS:  1. Assess for changes in respiratory status  2. Assess for changes in mentation and behavior  3. Position to facilitate oxygenation and minimize respiratory effort  4. Oxygen supplementation based on oxygen saturation or ABGs  5. Assess patient's ability to cough effectively  6. Encourage broncho-pulmonary hygiene including cough, deep breathe  7. Assess the need for suctioning   8. Assess and instruct to report SOB or any respiratory difficulty  9. Respiratory Therapy support as indicated, including medications and treatment.   Outcome: Progressing   02/17/18 1151   Interventions Appropriate for this Patient   Achieves optimal ventilation and oxygenation Assess for changes in mentation and behavior;Position to facilitate oxygenation and minimize respiratory effort;Assess for changes in respiratory status;Oxygen supplementation based on oxygen saturation or arterial blood gases;Assess patient's ability to cough effectively;Encourage broncho-pulmonary hygiene including cough, deep breathe;Assess and instruct to report shortness of breath or any respiratory difficulty;Respiratory Therapy support as indicated, including medications and treatment       Problem: Neurosensory - Adult  Goal: Achieves stable or improved neurological status  INTERVENTIONS  1. Assess for and report changes in neurological status  2. Initiate measures to prevent increased intracranial pressure  3. Maintain blood pressure and fluid volume within ordered parameters to optimize cerebral perfusion and minimize risk of hemorrhage  4. Monitor temperature, glucose, and sodium. Initiate appropriate interventions as ordered   Outcome: Progressing   02/17/18 1151   Interventions Appropriate for this Patient   Achieves stable or improved neurological status Assess for and report changes in neurological status       Problem: Skin/Tissue Integrity -  Adult  Goal: Incisions, wounds, or drain sites healing without S/S of infection  INTERVENTIONS  1. Assess and document risk factors for skin breakdown  2. Assess and document skin integrity  3. Assess and document dressing/incision, wound bed, drain sites and surrounding tissue  4. Implement wound care per orders   Outcome: Progressing   02/17/18 1151   Interventions Appropriate for this Patient   Incision(s), wound(s) or drain site(s) healing without S/S of infection Assess and document risk factors for skin breakdown;Assess and document skin integrity       Problem: Infection Control  Goal: MINIMIZE THE ACQUISITION AND TRANSMISSION OF INFECTIOUS AGENTS  INTERVENTIONS:  1. Isolate patient with suspected/diagnosed communicable disease  2. Place on designated isolation precautions  3. Maintain isolation techniques  4. Perform hand hygiene before and after each patient care activity  5. [place] universal precautions  6. Wear PPE as directed for type of isolation  7. Administer antibiotic therapy as ordered  8. Clean the environment appropriately after each patient use  9. Clean patient care equipment after each patient use as it leaves the room  10. Limit number of visitors, as appropriate   Outcome: Progressing   02/17/18 1151   Interventions Appropriate for this Patient   MINIMIZE THE ACQUISITION AND TRANSMISSION OF INFECT AGENTS  Isolate patient with suspected/diagnosed communicable disease;Perform hand hygiene before and after each patient care activity;Place on designated isolation precautions;Maintain isolation techniques;[place] universal precautions;Wear PPE as directed for type of isolation;Administer antibiotic therapy as ordered;Clean the environment appropriately after each patient use;Clean patient care equipment after each patient use as it leaves the room;Educate the patient/visitors on hand hygiene technique and need to complete upon entering/exiting the patient's room;Educate the patient/visitors  on how to avoid the spread of infection       Problem: Grooming  Goal: STG - Patient completes grooming  In sitting EOB 8 minutes w/vitals WNL    Outcome: Progressing      Problem: TRANSFERS  Goal: STG - Patient will transfer from bed to chair  With MIN assist   Outcome: Progressing      Problem: Knowledge Deficit  Goal: Patient/family/caregiver demonstrates understanding of disease process, treatment plan, medications, and discharge instructions  INTERVENTIONS:   1. Complete learning assessment and assess knowledge base  2. Provide teaching at level of understanding   3. Provide teaching via preferred learning methods   Outcome: Progressing   02/17/18 1151   Interventions Appropriate for this Patient   Patient/family/caregiver demonstrates understanding of disease process, treatment plan, medications, and discharge instructions Complete learning assessment and assess knowledge base;Provide teaching at level of understanding;Provide teaching via preferred learning methods       Problem: Potential for Compromised Skin Integrity  Goal: Skin Integrity is Maintained or Improved  INTERVENTIONS:  1. Assess and monitor skin integrity  2. Collaborate with interdisciplinary team and initiate plans and interventions as needed  3. Alternate a full bath with partial baths for elderly   4. Monitor patient's hygiene practices   5. Collaborate with wound, ostomy, and continence nurse   Outcome: Progressing   02/17/18 1151   Interventions Appropriate for this Patient   Skin integrity is maintained or improved Assess and monitor skin integrity;Collaborate with interdisciplinary team and initiate plans and interventions as needed;Monitor patient's hygiene practices     Goal: Nutritional status is improving  INTERVENTIONS:  1. Monitor and assess patient for malnutrition (ex- brittle hair, bruises, dry skin, pale skin and conjunctiva, muscle wasting, smooth red tongue, and disorientation)  2. Monitor patient's weight and dietary intake  as ordered or per policy  3. Determine patient's food preferences and provide high-protein, high-caloric foods as appropriate  4. Assist patient with eating   5. Allow adequate time for meals   6. Encourage patient to take dietary supplement as ordered   7. Collaborate with dietitian  8. Include patient/family/caregiver in decisions related to nutrition   Outcome: Progressing   02/17/18 1151   Interventions Appropriate for this Patient   Nutritional status is improving Monitor and assess patient for malnutrition (ex- brittle hair, bruises, dry skin, pale skin and conjunctiva, muscle wasting, smooth red tongue, and disorientation);Monitor patient's weight and dietary intake as ordered or per policy;Determine patient's food preferences and provide high-protein, high-caloric foods as appropriate;Allow adequate time for meals;Encourage patient to take dietary supplement as ordered;Collaborate with dietitian;Assist patient with eating;Include patient/family/caregiver in decisions related to nutrition     Goal: MOBILITY IS MAINTAINED OR IMPROVED  INTERVENTIONS  1. Collaborate with interdisciplinary team and initiate plan and interventions as ordered (PT/OT)  2. Encourage ambulation  3. Up to chair for meals  4. Monitor for signs of deconditioning   Outcome: Progressing   02/17/18 1151   Interventions Appropriate for this Patient   Mobility is Maintained or Improved Collaborate with interdisciplinary team and initiate plan and interventions as ordered (PT/OT);Up to chair for meals;Encourage ambulation       Problem: Urinary Incontinence  Goal: Perineal skin integrity is maintained or improved  INTERVENTIONS:  1. Assess genitourinary system, perineal skin, labs (urinalysis), and history of incontinence to include past management, aggravating, and alleviating factors  2. Collaborate with interdisciplinary team including wound, ostomy, and continence nurse and initiate plans and interventions as needed  4. Consider urine  containment device  5. Apply skin protectant   6. Develop skin care regimen  7. Provide privacy when changing patient's incontinence device to maintain their dignity   Outcome: Progressing   02/17/18 1151   Interventions Appropriate for this Patient   Perineal skin integrity is maintained or improved Collaborate with interdisciplinary team including wound, ostomy, and continence nurse and initiate plans and interventions as needed;Assess genitourinary system, perineal skin, labs (urinalysis), and history of incontinence to include past management, aggravating, and alleviating factors;Consider urine containment device;Apply skin protectant;Develop skin care regimen       Problem: Safety Adult - Fall  Goal: Free from fall injury  INTERVENTIONS:    Please reference Cares/Safety Flowsheet under Guillaume Fall Risk for interventions.   Outcome: Progressing

## 2018-02-17 NOTE — PLAN OF CARE
Problem: Respiratory - Adult  Goal: Achieves optimal ventilation and oxygenation  INTERVENTIONS:  1. Assess for changes in respiratory status  2. Assess for changes in mentation and behavior  3. Position to facilitate oxygenation and minimize respiratory effort  4. Oxygen supplementation based on oxygen saturation or ABGs  5. Assess patient's ability to cough effectively  6. Encourage broncho-pulmonary hygiene including cough, deep breathe  7. Assess the need for suctioning   8. Assess and instruct to report SOB or any respiratory difficulty  9. Respiratory Therapy support as indicated, including medications and treatment.   Outcome: Progressing   02/16/18 1941   Interventions Appropriate for this Patient   Achieves optimal ventilation and oxygenation Assess for changes in respiratory status;Assess for changes in mentation and behavior;Position to facilitate oxygenation and minimize respiratory effort;Oxygen supplementation based on oxygen saturation or arterial blood gases;Assess patient's ability to cough effectively;Encourage broncho-pulmonary hygiene including cough, deep breathe;Assess and instruct to report shortness of breath or any respiratory difficulty;Assess the need for suctioning;Respiratory Therapy support as indicated, including medications and treatment

## 2018-02-17 NOTE — DISCHARGE SUMMARY
Inpatient Discharge Summary    BRIEF OVERVIEW  Admitting Provider: Alexsander Park DO  Discharge Provider: Cooper Francis MD  Consultations: Pulmonary, Dr. Esqueda   primary Care Physician at Discharge: ANA LAURA CHAIDEZ [phone]  Admission Date: 2/8/2018     Discharge Date: 2/17/2018  Assessment/Plan     Principal Problem:    COPD with acute exacerbation (CMS/[facility])  Active Problems:    Acute bronchiolitis due to respiratory syncytial virus (RSV)    Acute-on-chronic respiratory failure (CMS/[facility])    Bronchitis    Chronic obstructive lung disease (CMS/[facility])    Obesity (BMI 30.0-34.9)    Discharge Disposition:  01 - Home or Self-Care  Code Status at Discharge: Full Code     Discharge medication list      START taking these medications      Instructions   ALPRAZolam 0.25 mg tablet  Commonly known as:  XANAX   Take 1 tablet (0.25 mg total) by mouth nightly as needed for anxiety for up to 10 days.     diltiazem  mg 24 hr capsule  Commonly known as:  CARDIZEM CD  Start taking on:  2/18/2018   Take 1 capsule (240 mg total) by mouth daily.     doxycycline 100 mg capsule  Commonly known as:  VIBRAMYCIN   Take 1 capsule (100 mg total) by mouth 2 (two) times a day before meals for 4 days.     metoprolol tartrate 25 mg tablet  Commonly known as:  LOPRESSOR   Take 1 tablet (25 mg total) by mouth 2 (two) times a day.     nystatin 100,000 unit/mL suspension  Commonly known as:  MYCOSTATIN   Take 4 mL (400,000 Units total) by mouth 4 (four) times a day.        CHANGE how you take these medications      Instructions   predniSONE 10 mg tablet  Commonly known as:  DELTASONE  What changed:  Another medication with the same name was added. Make sure you understand how and when to take each.      predniSONE 10 mg tablet  Commonly known as:  DELTASONE  What changed:  You were already taking a medication with the same name, and this prescription was added. Make sure you understand how and when to take each.   Take 4 tabs PO daily  (40 mg) for 2 days. Then 3 tab PO daily (30 mg) for 2 days. Then 2 tab PO daily (20 Mg) for 2 days.Then 1 tab PO daily        CONTINUE taking these medications      Instructions   ADVAIR DISKUS 500-50 mcg/dose diskus inhaler  Generic drug:  fluticasone-salmeterol      albuterol 2.5 mg/3 mL nebulizer solution   Take 3 mL (2.5 mg total) by nebulization every 6 (six) hours as needed for wheezing.     alendronate 70 mg tablet  Commonly known as:  FOSAMAX      ascorbic acid (vitamin C) 500 mg tablet  Commonly known as:  VITAMIN C      budesonide 1 mg/2 mL nebulizer solution  Commonly known as:  PULMICORT      DALIRESP 500 mcg tablet  Generic drug:  roflumilast      docusate sodium 100 mg capsule  Commonly known as:  COLACE      fexofenadine 60 mg tablet  Commonly known as:  ALLEGRA      fluticasone 50 mcg/actuation nasal spray  Commonly known as:  FLONASE      guaiFENesin 600 mg 12 hr tablet  Commonly known as:  MUCINEX   Take 1 tablet (600 mg total) by mouth 2 (two) times a day.     montelukast 10 mg tablet  Commonly known as:  SINGULAIR      multivitamin tablet tablet  Commonly known as:  THERAGRAN      omeprazole 20 mg capsule  Commonly known as:  PriLOSEC      SPIRIVA WITH HANDIHALER 1 capsule = per inhalation capsule  Generic drug:  tiotropium      theophylline 200 mg 12 hr tablet  Commonly known as:  THEODUR      VITAMIN D2 50,000 unit capsule  Generic drug:  ergocalciferol            Where to Get Your Medications      These medications were sent to [facility] - [place], [place] - [address] [address]    Phone:  [phone]   · diltiazem  mg 24 hr capsule  · doxycycline 100 mg capsule  · guaiFENesin 600 mg 12 hr tablet  · metoprolol tartrate 25 mg tablet  · nystatin 100,000 unit/mL suspension     You can get these medications from any pharmacy    Bring a paper prescription for each of these medications  · ALPRAZolam 0.25 mg tablet  · predniSONE 10 mg  tablet         Active Issues Requiring Follow-up:  Follow-up Appointments Arranged: Yes.    Outpatient Follow-Up  Future Appointments  Date Time Provider Department Center   3/8/2018 12:30 PM Esteban Baker MD [facility] PULM        DETAILS OF HOSPITAL STAY    Presenting Problem/History of Present Illness:  COPD exacerbation (CMS/[facility]) [J44.1]  COPD with acute exacerbation (CMS/[facility]) [J44.1]    Hospital Course:  Patient is 57 years obese lady with oxygen dependent COPD, transferred out of ICU after management of acute bronchiolitis due to respiratory symptoms of virus causing acute COPD exacerbation, acute on chronic hypoxic respiratory failure and sepsis secondary to likely bacterial infection.  She was treated with steroids, BiPAP, nebulizers in the ICU with some symptomatic improvement.  Telemetry shows sinus tachycardia and echocardiogram with 60% EF, no other  Significant abnormalities.. Her sinus tachycardia likely related to hypoxia and use of beta-2 agonists, was treated with anxiety medications, metoprolol and diltiazem.  Pulmonary was consulted and Dr. Esqueda continue to provide management during her hospital stay.. She was also treated with oral doxycycline, Singulair, loratadine, guaifenesin, Tessalon.  She was provided with incentive spirometry and wean the oxygen as able. Her TSH is suppressed but her T3 and T4 is within normal limit and theophylline level is within normal limits.    She continued to receive continue the PT/OT and other supportive care.  Patient has gradual improvement in her respiratory status and she is now at her baseline 4 L oxygen requirement.  She reports feeling much better symptomatically with no significant cough, chest congestion or wheeze, fever or chills, significant sputum production.  Patient wants to be discharged home today and her vitals, lab work and physical exam findings appear stable for discharge.  With pulmonary approval, patient is being discharged home with  continuation of further care at home and from clinic.    Labs:     Results from last 7 days  Lab Units 02/16/18  0550 02/15/18  0625 02/14/18  0431  02/13/18  0725 02/12/18  0409   WBC AUTO 10*3/uL 11.8* 11.2* 12.3*  < >  --  13.1*   HEMOGLOBIN g/dL 12.0 12.2 12.4  < >  --  13.0   HEMATOCRIT % 37.2 37.7 37.8  < >  --  39.8   PLATELETS AUTO 10*3/uL 247 248 239  < >  --  244   SODIUM mmol/L 142 144 142  --  142 142   POTASSIUM mmol/L 4.4 3.9 4.2  --  4.1 3.9   CHLORIDE mmol/L 100 101 99  --  100 98   CO2 mmol/L 36* 37* 37*  --  33* 34*   BUN mg/dL 28* 18 14  --  15 17   CREATININE mg/dL 0.7 0.6 0.6  --  0.7 0.6   GLUCOSE mg/dL 84 90 127*  --  161* 161*   CALCIUM mg/dL 9.7 9.8 9.7  --  9.8 9.3   MAGNESIUM mg/dL  --   --  2.2  --  2.1 2.2   PHOSPHORUS mg/dL  --   --  3.8  --  3.1 4.3   < > = values in this interval not displayed.    Results from last 7 days  Lab Units 02/14/18  0431 02/13/18 0725 02/12/18  0409   ALBUMIN g/dL 3.4* 3.6 3.5   TSH uIU/ml 0.205*  --   --    FREE T4 ng/dL 0.97  --   --      X-ray Chest Portable 1 View    Result Date: 2/16/2018  Narrative: EXAM : Chest x-ray 02/16/2018 0605 hours CLINICAL HISTORY :  Shortness of breath; Pleural effusion    COMPARISON(S) : 02/10/2018 VIEWS : AP portable chest FINDINGS : Relatively lordotic positioning noted. Lungs:    Hyperinflated with minor left basal pulmonary atelectasis or infiltrate Pleural spaces:     Normal, as seen. Heart:  Normal Pulmonary vasculature:     Normal Bones:  Unremarkable for age              Impression: Pulmonary hyperinflation again evident suggestive of COPD with minor left basal pulmonary atelectasis or infiltrate     Xr Chest Portable 1 View    Result Date: 2/10/2018  Narrative: Exam: Portable Chest AP 02/10/2018    Clinical History: Respiratory failure/fluid status Comparison(s): 2/8/2018 Findings: Emphysematous changes are again noted with hyperinflation of the lungs. No acute appearing infiltrate. Heart size and vascular markings  are normal. Some blunting noted of both costophrenic angle suggesting small effusions appearing new.     Impression: 1.  Small bilateral effusions which appear new. No other acute change.    X-ray Chest Portable 1 View    Result Date: 2/8/2018  Narrative: Exam: Chest x-ray - AP portable upright view 02/08/2018 1608 hours Clinical History:  Shortness of breath Comparison/s:  12/2/2017 Findings:  The heart and pulmonary vasculature are normal. The mediastinal contours are normal. Emphysematous changes are present within the lungs, similar to previous. No lung consolidation or pleural effusion is present.     Impression:  Emphysema.      Physical Exam at Discharge:    Discharge Condition: Fair    Heart Rate: 90  Resp: 18  BP: 124/77  Temp: 36.3 °C (97.3 °F)  Weight: 77.8 kg (171 lb 8.3 oz)    Denita is resting in bed, appears comfortable.  HEENT: No pallor, no JVD, On 4L oxygen .  Chest: Improving crackles both lungs.  Further  minimal expiratory wheeze.  CVS: Regular, rate controlled, no murmurs.  Abdomen: Soft,  bowel sounds present.  Extremities: No edema.    Discharge Recommendations:  · Continue with the doxycycline for next 4 days and prednisone tapering as recommended.  · Continue with nebulizers and other inhalers as recommended.  · Continue with oxygen supplement 4 L/min as before.  · May continue with Xanax as needed for anxiety.  · Continue with metoprolol and diltiazem for tachycardia.  May discontinue them if you develop hypotension.  · Follow-up with the primary care doctor in a week.  · Follow-up with pulmonary clinic in 2-3 weeks.    Time spent coordinating discharge: More than 35 minutes spent in discharging this patient which includes time to take patient evaluation, writing orders, prescriptions, documentation, care coordination and discharge summary.    Dragon voice recognition program was used to aid in this documentation which might generate inaccuracies despite efforts made to avoid them. Please  take it into context and contact the provider if areas of error are identified.      RG PIERCE MD  Hospitalist,   [facility].

## 2018-02-17 NOTE — NURSING END OF SHIFT
Nursing End of Shift Summary    Goals:  Clinical Goals for the Shift: Reduced oxygen needs    Narrative Summary of Progress Towards Clinical Goals:  Documented goal was achieved.    Barriers for Transfer or Discharge: yes  Possible discharge on 2/17/18.

## 2018-02-18 ENCOUNTER — TELEPHONE (OUTPATIENT)
Dept: HOSPITALIST | Facility: HOSPITAL | Age: 58
End: 2018-02-18

## 2018-03-08 ENCOUNTER — OFFICE VISIT (OUTPATIENT)
Dept: PULMONOLOGY | Facility: CLINIC | Age: 58
End: 2018-03-08
Payer: COMMERCIAL

## 2018-03-08 VITALS
HEIGHT: 63 IN | SYSTOLIC BLOOD PRESSURE: 109 MMHG | DIASTOLIC BLOOD PRESSURE: 65 MMHG | HEART RATE: 99 BPM | OXYGEN SATURATION: 92 % | BODY MASS INDEX: 31.01 KG/M2 | WEIGHT: 175 LBS

## 2018-03-08 DIAGNOSIS — J44.1 COPD EXACERBATION (CMS/HCC): Primary | ICD-10-CM

## 2018-03-08 PROCEDURE — 99214 OFFICE O/P EST MOD 30 MIN: CPT | Performed by: INTERNAL MEDICINE

## 2018-03-08 RX ORDER — PREDNISONE 10 MG/1
TABLET ORAL
Qty: 30 TABLET | Refills: 0 | Status: SHIPPED | OUTPATIENT
Start: 2018-03-08 | End: 2018-04-07

## 2018-03-08 RX ORDER — ALPRAZOLAM 0.25 MG/1
0.25 TABLET ORAL NIGHTLY PRN
Status: ON HOLD | COMMUNITY
End: 2023-06-22

## 2018-03-08 RX ORDER — DOXYCYCLINE HYCLATE 100 MG
100 TABLET ORAL 2 TIMES DAILY
Qty: 20 TABLET | Refills: 0 | Status: SHIPPED | OUTPATIENT
Start: 2018-03-08 | End: 2018-03-18

## 2018-03-08 ASSESSMENT — ENCOUNTER SYMPTOMS
PSYCHIATRIC NEGATIVE: 1
CHOKING: 0
FREQUENCY: 1
ALLERGIC/IMMUNOLOGIC NEGATIVE: 1
SEIZURES: 0
RHINORRHEA: 1
CHILLS: 0
PALPITATIONS: 0
BRUISES/BLEEDS EASILY: 0
FATIGUE: 1
VOMITING: 0
STRIDOR: 0
EYES NEGATIVE: 1
ADENOPATHY: 0
SORE THROAT: 0
ENDOCRINE NEGATIVE: 1
DIAPHORESIS: 0
BACK PAIN: 1
DYSURIA: 0
TROUBLE SWALLOWING: 0
DIARRHEA: 0
CONSTIPATION: 0
NAUSEA: 0
COUGH: 1
FEVER: 0
WHEEZING: 0
SHORTNESS OF BREATH: 1

## 2018-03-08 NOTE — PATIENT INSTRUCTIONS
Scripts sent in to pharmacy for prednisone and doxycycline.  Continue oxygen and other medications.

## 2018-03-09 NOTE — PROGRESS NOTES
Subjective      Patient ID: Denita Spring is a 57 y.o. female.    HPI  Patient is in for follow-up of her end-stage COPD.  She has been hospitalized multiple times since I saw her last fall.  These included admissions in [place] as well as here and [place].  She has had hospitalizations here both in December and most recently February.  She was discharged from the hospital on February 17, 2018.  During that hospitalization she presented with acute on chronic hypoxic respiratory failure and possible sepsis.  She was treated with steroids BiPAP and nebulizers the ICU she had significant sinus tachycardia on telemetry and was placed on a low-dose of metoprolol and as well as diltiazem.  She also was given alprazolam for anxiety.  T3 and T4 within normal limits.  Theophylline level was within normal limits.  Oxygen requirements were at 4 L/min.  She was sent home on doxycycline and tapering dose of prednisone.    She now feels she is getting a cold again she has had yellow sputum production she has had cough and is a little more short of breath.  She said no fevers chills or sweats.  She has finished her prednisone.  She remains on  Allegra, montelukast, theophylline 200 mg twice daily, Daliresp 500 mcg a day, Spiriva 1 dose a day, Advair discus 1 puff twice a day and albuterol nebulizer as needed.  She states she is setting her oxygen at 4 L/min.    She also has questions about BiPAP which was recommended to her during one of her hospitalizations.  She gets her oxygen through [facility].  Arterial blood gas on February 10, 2018 showed a pH of 7.34 PCO2 of 66 and PO2 of 84.  This was on noninvasive ventilatory device with pressure support.      Patient also had questions about whether we have heard from lung transplant center.  She has had contact with him by phone but has never made it to a consultation.  We have received no communication since the last referral we sent we work on paperwork for  "referral with [place] [employer] when I saw her last in August 2017.  She did not make it back to a follow-up visit until now.  The following have been reviewed and updated as appropriate in this visit:  No text in SmartText       Review of Systems   Constitutional: Positive for fatigue. Negative for chills, diaphoresis and fever.   HENT: Positive for postnasal drip and rhinorrhea. Negative for sore throat and trouble swallowing.    Eyes: Negative.    Respiratory: Positive for cough and shortness of breath. Negative for choking, wheezing and stridor.    Cardiovascular: Negative for chest pain, palpitations and leg swelling.   Gastrointestinal: Negative for constipation, diarrhea, nausea and vomiting.   Endocrine: Negative.    Genitourinary: Positive for frequency. Negative for dysuria.        She states that her \"kidneys\" hurt.  She had an unremarkable urinalysis at her last hospitalization.   Musculoskeletal: Positive for back pain.   Allergic/Immunologic: Negative.    Neurological: Negative for seizures and syncope.   Hematological: Negative for adenopathy. Does not bruise/bleed easily.   Psychiatric/Behavioral: Negative.      /65 (BP Location: Left arm, Patient Position: Sitting, Cuff Size: Reg)   Pulse 99   Ht 1.6 m (5' 3\")   Wt 79.4 kg (175 lb)   SpO2 92%   BMI 31.00 kg/m²       Objective     Physical Exam   Constitutional: She is oriented to person, place, and time. No distress.   Patient seated in a wheelchair.  She has cushingoid features.  Wearing nasal cannula oxygen.  She has mild cough but no acute respiratory distress.   HENT:   Head: Normocephalic.   Left Ear: External ear normal.   Nose: Nose normal.   Mouth/Throat: Oropharynx is clear and moist.   Eyes: EOM are normal. Pupils are equal, round, and reactive to light.   Neck: No JVD present. No tracheal deviation present. No thyromegaly present.   Cardiovascular: Normal rate and regular rhythm.    No murmur heard.  Pulmonary/Chest: Effort " normal. She has no wheezes.   Chest is resonant to percussion.  Breath sounds are markedly decreased but clear.   Musculoskeletal: She exhibits no edema.   Lymphadenopathy:     She has no cervical adenopathy.   Neurological: She is alert and oriented to person, place, and time.   Skin: Skin is warm and dry. No rash noted. She is not diaphoretic.   Psychiatric: She has a normal mood and affect. Her behavior is normal. Thought content normal.       Assessment/Plan    1.  End-stage COPD  2.  Chronic respiratory failure  3.  Obesity  4.  History of sinus tachycardia  6.  Osteoporosis  7.  Hypoxemia  Discussion; patient has extremely advanced COPD.  Right now she has had an increase of bronchitic symptoms.  I am and put her back on a taper prednisone in 10 days of doxycycline.  She should continue her Advair Spiriva albuterol montelukast and Daliresp.  She should continue oxygen supplementation.    BiPAP is a consideration and she had just shown some CO2 retention with the last hospitalization.  When admitted in December PCO2 was only minimally elevated 42.    In terms of lung transplant I told her she needs to contact the transplant center and see if there is more information that they need.  It is been very difficult to get her in for regular follow-up for a multitude of reasons including frequent hospitalizations.  I know that paperwork was sent to [place] [employer] last summer.    I like to follow-up with her in a month.

## 2018-03-13 ENCOUNTER — TELEPHONE (OUTPATIENT)
Dept: TRANSPLANT | Facility: CLINIC | Age: 58
End: 2018-03-13

## 2018-03-13 NOTE — TELEPHONE ENCOUNTER
Patient was calling to follow up on if you have received her cat scan and all other test she has done.

## 2018-04-11 ENCOUNTER — TELEPHONE (OUTPATIENT)
Dept: TRANSPLANT | Facility: CLINIC | Age: 58
End: 2018-04-11

## 2018-04-24 ENCOUNTER — TELEPHONE (OUTPATIENT)
Dept: PULMONOLOGY | Facility: CLINIC | Age: 58
End: 2018-04-24

## 2018-04-30 DIAGNOSIS — J44.9 COPD (CHRONIC OBSTRUCTIVE PULMONARY DISEASE) (H): Primary | ICD-10-CM

## 2018-04-30 DIAGNOSIS — J45.909 ASTHMA: ICD-10-CM

## 2018-04-30 NOTE — Clinical Note
Marcelo Martinez, New patient for Zavala:  Held 3:10 and 3:50 on Tuesday 5/15.   Need full PFTs and 6mw prior to appt, and CT chest high res with insp/exp cuts to follow appointment.   Thanks, Patient aware of date only. Bertha

## 2018-04-30 NOTE — PROGRESS NOTES
Confirmed plan with Denise for clinic visit on 5/14.  Discussed that preference is for a clinic visit with testing to determine whether can proceed with a full evaluation for transplant.  Unable to schedule a full eval at this time.  Orders completed for clinic visit.

## 2018-05-15 ENCOUNTER — RADIANT APPOINTMENT (OUTPATIENT)
Dept: CT IMAGING | Facility: CLINIC | Age: 58
End: 2018-05-15
Attending: INTERNAL MEDICINE
Payer: MEDICAID

## 2018-05-15 ENCOUNTER — OFFICE VISIT (OUTPATIENT)
Dept: TRANSPLANT | Facility: CLINIC | Age: 58
End: 2018-05-15
Attending: INTERNAL MEDICINE
Payer: MEDICAID

## 2018-05-15 VITALS
OXYGEN SATURATION: 96 % | DIASTOLIC BLOOD PRESSURE: 76 MMHG | WEIGHT: 170.9 LBS | BODY MASS INDEX: 30.27 KG/M2 | SYSTOLIC BLOOD PRESSURE: 113 MMHG | HEART RATE: 113 BPM

## 2018-05-15 DIAGNOSIS — J44.9 COPD (CHRONIC OBSTRUCTIVE PULMONARY DISEASE) (H): ICD-10-CM

## 2018-05-15 DIAGNOSIS — J44.9 COPD (CHRONIC OBSTRUCTIVE PULMONARY DISEASE) (H): Primary | ICD-10-CM

## 2018-05-15 DIAGNOSIS — J43.2 CENTRILOBULAR EMPHYSEMA (H): Primary | ICD-10-CM

## 2018-05-15 DIAGNOSIS — J45.909 ASTHMA: ICD-10-CM

## 2018-05-15 LAB
6 MIN WALK (FT): 200 FT
6 MIN WALK (M): 61 M

## 2018-05-15 PROCEDURE — G0463 HOSPITAL OUTPT CLINIC VISIT: HCPCS | Mod: ZF

## 2018-05-15 RX ORDER — THEOPHYLLINE 200 MG/1
200 TABLET, EXTENDED RELEASE ORAL
COMMUNITY

## 2018-05-15 RX ORDER — IPRATROPIUM BROMIDE AND ALBUTEROL SULFATE 2.5; .5 MG/3ML; MG/3ML
SOLUTION RESPIRATORY (INHALATION)
COMMUNITY

## 2018-05-15 RX ORDER — NYSTATIN 100000/ML
400000 SUSPENSION, ORAL (FINAL DOSE FORM) ORAL
COMMUNITY
Start: 2018-02-17

## 2018-05-15 RX ORDER — LANCETS
EACH MISCELLANEOUS
COMMUNITY

## 2018-05-15 RX ORDER — FLUTICASONE PROPIONATE 50 MCG
1 SPRAY, SUSPENSION (ML) NASAL
COMMUNITY
Start: 2015-03-16

## 2018-05-15 RX ORDER — BUDESONIDE 0.5 MG/2ML
INHALANT ORAL
COMMUNITY

## 2018-05-15 RX ORDER — ALENDRONATE SODIUM 70 MG/1
70 TABLET ORAL
COMMUNITY

## 2018-05-15 RX ORDER — MONTELUKAST SODIUM 10 MG/1
10 TABLET ORAL
COMMUNITY
Start: 2013-05-06

## 2018-05-15 RX ORDER — ACETAMINOPHEN 325 MG/1
650 TABLET ORAL
COMMUNITY

## 2018-05-15 RX ORDER — ALBUTEROL SULFATE 90 UG/1
AEROSOL, METERED RESPIRATORY (INHALATION)
COMMUNITY

## 2018-05-15 RX ORDER — ARFORMOTEROL TARTRATE 15 UG/2ML
15 SOLUTION RESPIRATORY (INHALATION)
COMMUNITY

## 2018-05-15 RX ORDER — TIOTROPIUM BROMIDE 18 UG/1
1 CAPSULE ORAL; RESPIRATORY (INHALATION)
COMMUNITY

## 2018-05-15 RX ORDER — ALPRAZOLAM 0.25 MG
0.25 TABLET ORAL
COMMUNITY

## 2018-05-15 RX ORDER — ERGOCALCIFEROL 1.25 MG/1
50000 CAPSULE, LIQUID FILLED ORAL
COMMUNITY

## 2018-05-15 RX ORDER — MULTIVIT-MIN/IRON/FOLIC ACID/K 18-600-40
500 CAPSULE ORAL
COMMUNITY
Start: 2012-03-14 | End: 2018-05-15

## 2018-05-15 RX ORDER — ALBUTEROL SULFATE 0.83 MG/ML
2.5 SOLUTION RESPIRATORY (INHALATION)
COMMUNITY
Start: 2017-12-05

## 2018-05-15 RX ORDER — ROFLUMILAST 500 UG/1
500 TABLET ORAL
COMMUNITY

## 2018-05-15 RX ORDER — AZELASTINE 1 MG/ML
1 SPRAY, METERED NASAL
COMMUNITY

## 2018-05-15 RX ORDER — CALCIUM CARBONATE 750 MG/1
1500 TABLET, CHEWABLE ORAL
COMMUNITY

## 2018-05-15 RX ORDER — ASCORBIC ACID 500 MG
1000 TABLET ORAL
COMMUNITY

## 2018-05-15 RX ORDER — GUAIFENESIN 600 MG/1
600 TABLET, EXTENDED RELEASE ORAL
COMMUNITY
Start: 2018-02-17

## 2018-05-15 RX ORDER — FERROUS SULFATE 325(65) MG
325 TABLET ORAL
COMMUNITY

## 2018-05-15 RX ORDER — ALBUTEROL SULFATE 0.83 MG/ML
SOLUTION RESPIRATORY (INHALATION)
COMMUNITY

## 2018-05-15 RX ORDER — PREDNISONE 10 MG/1
10 TABLET ORAL
COMMUNITY

## 2018-05-15 ASSESSMENT — PAIN SCALES - GENERAL: PAINLEVEL: EXTREME PAIN (8)
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
## 2018-05-15 NOTE — MR AVS SNAPSHOT
"              After Visit Summary   5/15/2018    Denise Lawson    MRN: [id number]           Patient Information     Date Of Birth          1960        Visit Information        Provider Department      5/15/2018 3:10 PM Gustavo Zavala MD [facility]        Today's Diagnoses     Centrilobular emphysema (H)    -  1       Follow-ups after your visit        Who to contact     If you have questions or need follow up information about today's clinic visit or your schedule please contact [facility] directly at [phone].  Normal or non-critical lab and imaging results will be communicated to you by Gaik[employer], letter or phone within 4 business days after the clinic has received the results. If you do not hear from us within 7 days, please contact the clinic through [employer] or phone. If you have a critical or abnormal lab result, we will notify you by phone as soon as possible.  Submit refill requests through [employer] or call your pharmacy and they will forward the refill request to us. Please allow 3 business days for your refill to be completed.          Additional Information About Your Visit        MyChart Information     [employer] lets you send messages to your doctor, view your test results, renew your prescriptions, schedule appointments and more. To sign up, go to [email or web address] . Click on \"Log in\" on the left side of the screen, which will take you to the Welcome page. Then click on \"Sign up Now\" on the right side of the page.     You will be asked to enter the access code listed below, as well as some personal information. Please follow the directions to create your username and password.     Your access code is: [email or web address]  Expires: 2018  3:59 PM     Your access code will  in 90 days. If you need help or a new code, please call your [place] clinic or [phone].        Care EveryWhere ID     This is your Care EveryWhere ID. This could be " used by other organizations to access your [place] medical records  [id number]        Your Vitals Were     Pulse Pulse Oximetry BMI (Body Mass Index)             113 96% 30.27 kg/m2          Blood Pressure from Last 3 Encounters:   05/15/18 113/76    Weight from Last 3 Encounters:   05/15/18 77.5 kg (170 lb 14.4 oz)   08/15/17 79.4 kg (175 lb)   03/20/15 78 kg (172 lb)              Today, you had the following     No orders found for display         Today's Medication Changes          These changes are accurate as of 5/15/18 11:59 PM.  If you have any questions, ask your nurse or doctor.               These medicines have changed or have updated prescriptions.        Dose/Directions    ascorbic acid 500 MG tablet   Commonly known as:  VITAMIN C   This may have changed:  Another medication with the same name was removed. Continue taking this medication, and follow the directions you see here.   Changed by:  Gustavo Zavala MD        Dose:  1000 mg   1,000 mg   Refills:  0                Primary Care Provider Office Phone # Fax #    Raman Brightsteve [phone]       [address] [address]        Equal Access to Services     [facility]: Hadii aad ku hadasho Soomaali, waaxda luqadaha, qaybta kaalmada adeegyada, beth farmer. So [facility] [phone].    ATENCIÓN: Si habla español, tiene a carolina disposición servicios gratuitos de asistencia lingüística. Llame al [phone].    We comply with applicable federal civil rights laws and [place] laws. We do not discriminate on the basis of race, color, national origin, age, disability, sex, sexual orientation, or gender identity.            Thank you!     Thank you for choosing [facility]  for your care. Our goal is always to provide you with excellent care. Hearing back from our patients is one way we can continue to improve our services. Please take a few minutes to  complete the written survey that you may receive in the mail after your visit with us. Thank you!             Your Updated Medication List - Protect others around you: Learn how to safely use, store and throw away your medicines at www.disposemymeds.org.          This list is accurate as of 5/15/18 11:59 PM.  Always use your most recent med list.                   Brand Name Dispense Instructions for use Diagnosis    acetaminophen 325 MG tablet    TYLENOL     650 mg        ADVAIR DISKUS 500-50 MCG/DOSE diskus inhaler   Generic drug:  fluticasone-salmeterol           * albuterol (2.5 MG/3ML) 0.083% neb solution      Inhale 1 nebule by nebulization 4 times a day as needed        * albuterol 108 (90 Base) MCG/ACT Inhaler    PROAIR HFA/PROVENTIL HFA/VENTOLIN HFA     Inhale orally Every 4 hours as needed Shake well before using.        * albuterol (2.5 MG/3ML) 0.083% neb solution      Inhale 2.5 mg into the lungs        alendronate 70 MG tablet    FOSAMAX     70 mg        ALPRAZolam 0.25 MG tablet    XANAX     Take 0.25 mg by mouth        arformoterol 15 MCG/2ML Nebu neb solution    BROVANA     15 mcg        ascorbic acid 500 MG tablet    VITAMIN C     1,000 mg        azelastine 0.1 % spray    ASTELIN     1 spray        budesonide 0.5 MG/2ML neb solution    PULMICORT     Inhale 1 nebule by nebulization 2 times a day        calcium carbonate 750 MG Chew      1,500 mg        cholecalciferol 5000 units Tabs tablet    vitamin D3     5,000 Units        COAGUCHEK LANCETS Misc           COLACE PO      Take 100 mg by mouth        ferrous sulfate 325 (65 Fe) MG tablet    IRON     325 mg        fluticasone 50 MCG/ACT spray    FLONASE     Spray 1 spray in nostril        guaiFENesin 600 MG 12 hr tablet    MUCINEX     Take 600 mg by mouth        * Ipratropium-Albuterol  MCG/ACT inhaler    COMBIVENT RESPIMAT     1 puff        * ipratropium - albuterol 0.5 mg/2.5 mg/3 mL 0.5-2.5 (3) MG/3ML neb solution    DUONEB     Inhale 1  unit-dose by nebulization Every 4 hours        metFORMIN 500 MG tablet    GLUCOPHAGE     500 mg        montelukast 10 MG tablet    SINGULAIR     Take 10 mg by mouth        MULTIVITAMIN ADULTS PO      Take 1 tablet by mouth        nystatin 583685 UNIT/ML suspension    MYCOSTATIN     Take 400,000 Units by mouth        OMEPRAZOLE PO      Take 20 mg by mouth        predniSONE 10 MG tablet    DELTASONE     Take 10 mg by mouth        roflumilast 500 MCG Tabs tablet    DALIRESP     500 mcg        theophylline 200 MG 12 hr tablet    THEODUR     Take 200 mg by mouth        tiotropium 18 MCG capsule    SPIRIVA     Inhale 1 capsule into the lungs        vitamin D 14388 UNIT capsule    ERGOCALCIFEROL     Take 50,000 Units by mouth        * Notice:  This list has 5 medication(s) that are the same as other medications prescribed for you. Read the directions carefully, and ask your doctor or other care provider to review them with you.
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
## 2018-05-16 NOTE — NURSING NOTE
Patient accompanied by: son and nephew  Current activity level: very minimal, moves from chair to chair in home  Pulmonary Rehab: difficulty getting out of home due to asthma/sensitivity to air contaminants    Current oxygen use:  4 liters at rest   Diabetic status: not currently diagnosed as diabetic  Data Unavailable 170 lbs 14.4 oz Body mass index is 30.27 kg/(m^2).    Medication changes: no changes  Plan: confirmed will review patient's case with lung transplant team on behalf of lung transplant candidacy, then will call her with team recommendations.

## 2018-05-17 ENCOUNTER — COMMITTEE REVIEW (OUTPATIENT)
Dept: TRANSPLANT | Facility: CLINIC | Age: 58
End: 2018-05-17
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
## 2018-05-22 PROBLEM — J43.2 CENTRILOBULAR EMPHYSEMA (H): Status: ACTIVE | Noted: 2018-05-22

## 2018-05-29 LAB
DLCOUNC-%PRED-PRE: 38 %
DLCOUNC-PRE: 8.35 ML/MIN/MMHG
DLCOUNC-PRED: 21.44 ML/MIN/MMHG
ERV-%PRED-PRE: 130 %
ERV-PRE: 0.78 L
ERV-PRED: 0.6 L
EXPTIME-PRE: 10.76 SEC
FEF2575-%PRED-PRE: 7 %
FEF2575-PRE: 0.16 L/SEC
FEF2575-PRED: 2.24 L/SEC
FEFMAX-%PRED-PRE: 32 %
FEFMAX-PRE: 2.04 L/SEC
FEFMAX-PRED: 6.26 L/SEC
FEV1-%PRED-PRE: 20 %
FEV1-PRE: 0.47 L
FEV1FEV6-PRE: 39 %
FEV1FEV6-PRED: 81 %
FEV1FVC-PRE: 32 %
FEV1FVC-PRED: 79 %
FEV1SVC-PRE: 35 %
FEV1SVC-PRED: 73 %
FIFMAX-PRE: 2.76 L/SEC
FRCPLETH-%PRED-PRE: 235 %
FRCPLETH-PRE: 6.14 L
FRCPLETH-PRED: 2.61 L
FVC-%PRED-PRE: 50 %
FVC-PRE: 1.45 L
FVC-PRED: 2.86 L
IC-%PRED-PRE: 20 %
IC-PRE: 0.53 L
IC-PRED: 2.56 L
RVPLETH-%PRED-PRE: 302 %
RVPLETH-PRE: 5.36 L
RVPLETH-PRED: 1.77 L
TLCPLETH-%PRED-PRE: 142 %
TLCPLETH-PRE: 6.67 L
TLCPLETH-PRED: 4.69 L
VA-%PRED-PRE: 62 %
VA-PRE: 3.02 L
VC-%PRED-PRE: 41 %
VC-PRE: 1.32 L
VC-PRED: 3.16 L

## 2018-06-04 ENCOUNTER — DOCUMENTATION (OUTPATIENT)
Dept: PULMONOLOGY | Facility: CLINIC | Age: 58
End: 2018-06-04

## 2018-06-04 NOTE — PROGRESS NOTES
Patient was evaluated for lung transplant.  Lung function testing showed a prebronchodilator FEV1 of 0.47 L and postbronchodilator FEV1 of 2.86 L.  CT scan was read as showing bilateral emphysema with groundglass opacification left upper lobe.  Lung transplant evaluation process was reviewed.  Infection risk post transplant was reviewed.  Pros and cons of lung transplant were reviewed at length with the patient.  Records will be incorporated into the chart.

## 2018-06-07 ENCOUNTER — TELEPHONE (OUTPATIENT)
Dept: TRANSPLANT | Facility: CLINIC | Age: 58
End: 2018-06-07

## 2018-06-07 NOTE — LETTER
June 11, 2018    Denise Lawson  [address]  [place] [address]      Dear Ms. Lawson,   The purpose of this letter is to let you know that  the [facility] Multi-Disciplinary Selection Team reviewed your medical history and CT chest imaging.  Based on the selection criteria used by our program, the decision was made that lung transplant would not be recommended for you.  This is primarily because of the tracheobronchomalacia noted on chest CT as we discussed in clinic and by phone.  This will not improve with lung transplant.  Additional concerns include long term prednisone use and deconditioning.   Important things you should know:    If you would like to discuss the decision, or if your medical status changes you may schedule a return visits with your doctor by calling [phone] and asking to speak to your transplant coordinator.    We recommend that you continue to follow up with your primary care doctor in order to manage your health concerns.  Enclosed is a letter from [employer] which describes the services offered to patients by [facility] and the [facility].  Thank you for allowing us to participate in your care.  We wish you well.  Sincerely,    Bertha Amos RN    Solid Organ Transplant  [facility]    Enclosures: OS Letter  cc: Care Team: Nazario Anguiano MD; Saw Price MD

## 2018-06-11 ENCOUNTER — DOCUMENTATION (OUTPATIENT)
Dept: PULMONOLOGY | Facility: CLINIC | Age: 58
End: 2018-06-11

## 2018-06-11 NOTE — TELEPHONE ENCOUNTER
Called Denise to confirm that her history and CT images were reviewed with the lung transplant team.  The team determined that she does not meet criteria for lung transplant.  They are concerned that transplant would not be successful for her.  Primary concern is the evidence of tracheobronchomalacia noted on chest CT and reviewed with patient in clinic visit.  This makes it more difficult for her to move air in and out and will not be fixed with a lung transplant.  In addition, that fact that she is very debilitated and has been on long-term prednisone would increase risk of her not doing well with transplant.     Denise feels that she would do better without steroids and wonders if the tracheobronchial malacia would improve then.  Discussed that this is unlikely.      She said that she developed shingles after her trip here, now mostly resolved.    Denise does request that if there is any recommendations that Dr. Zavala has for her care, she would appreciate knowing about them.  Confirmed that she and her physicians will receive a letter with the team decision.

## 2018-06-12 NOTE — PROGRESS NOTES
I received a note on Rafia Spring from the [facility] multidisciplinary selection team for lung transplant.  Lung transplant was not recommended for the patient.  It was mentioned that this was primarily due to tracheobronchomalacia noted on CT scan.  They also had concerns about long-term prednisone use and deconditioning.

## 2018-07-20 ENCOUNTER — OFFICE VISIT (OUTPATIENT)
Dept: PULMONOLOGY | Facility: CLINIC | Age: 58
End: 2018-07-20
Payer: COMMERCIAL

## 2018-07-20 VITALS
WEIGHT: 165 LBS | DIASTOLIC BLOOD PRESSURE: 72 MMHG | SYSTOLIC BLOOD PRESSURE: 142 MMHG | BODY MASS INDEX: 29.23 KG/M2 | HEART RATE: 117 BPM | OXYGEN SATURATION: 97 % | HEIGHT: 63 IN

## 2018-07-20 DIAGNOSIS — J44.9 CHRONIC OBSTRUCTIVE PULMONARY DISEASE, UNSPECIFIED COPD TYPE (CMS/HCC): Primary | ICD-10-CM

## 2018-07-20 DIAGNOSIS — R09.02 HYPOXEMIA: ICD-10-CM

## 2018-07-20 PROCEDURE — 99214 OFFICE O/P EST MOD 30 MIN: CPT | Performed by: INTERNAL MEDICINE

## 2018-07-20 ASSESSMENT — ENCOUNTER SYMPTOMS
LIGHT-HEADEDNESS: 0
ADENOPATHY: 0
ENDOCRINE NEGATIVE: 1
FATIGUE: 1
PSYCHIATRIC NEGATIVE: 1
RHINORRHEA: 1
FEVER: 0
DIAPHORESIS: 0
SORE THROAT: 1
SHORTNESS OF BREATH: 1
UNEXPECTED WEIGHT CHANGE: 0
ARTHRALGIAS: 1
WHEEZING: 1
BRUISES/BLEEDS EASILY: 0
COUGH: 1
EYES NEGATIVE: 1
DYSURIA: 1
GASTROINTESTINAL NEGATIVE: 1
CHILLS: 0

## 2018-07-20 ASSESSMENT — PAIN SCALES - GENERAL: PAINLEVEL: 8

## 2018-07-20 NOTE — PROGRESS NOTES
"Subjective      Patient ID: Denita Spring is a 58 y.o. female.    HPI  Patient is in for follow-up.  She has been evaluated by [facility] lung transplant.  She is not a candidate for lung transplantation.  She had significant tracheobronchomalacia on CT scan and they were concerned about deconditioning and long-term steroid needs.    Clinically she has not required admission to the hospital since February.  She is try to taper prednisone but does not have much luck getting below 10 mg a day.  She continues to have some cough.  She has had no significant sputum production.  She did get a Z-Ben in June.  Present prednisone dose is 10 mg a day.  She remains on theophylline Spiriva Daliresp and Advair.  She is also using Allegra and Flonase.    He had recent shingles are healing.    Functional status remains poor.  The following have been reviewed and updated as appropriate in this visit:  No text in SmartText       Review of Systems   Constitutional: Positive for fatigue. Negative for chills, diaphoresis, fever and unexpected weight change.   HENT: Positive for postnasal drip, rhinorrhea and sore throat (recent strep throat).    Eyes: Negative.    Respiratory: Positive for cough, shortness of breath and wheezing.    Cardiovascular: Positive for leg swelling.   Gastrointestinal: Negative.    Endocrine: Negative.    Genitourinary: Positive for dysuria.   Musculoskeletal: Positive for arthralgias.   Allergic/Immunologic: Positive for environmental allergies.   Neurological: Negative for syncope and light-headedness.   Hematological: Negative for adenopathy. Does not bruise/bleed easily.   Psychiatric/Behavioral: Negative.      /72 (BP Location: Right arm, Patient Position: Sitting, Cuff Size: Reg)   Pulse 117   Ht 1.6 m (5' 3\")   Wt 74.8 kg (165 lb)   SpO2 97%   BMI 29.23 kg/m²       Objective     Physical Exam   Constitutional: She is oriented to person, place, and time. She appears well-developed. " No distress.   HENT:   Head: Normocephalic.   Right Ear: External ear normal.   Left Ear: External ear normal.   Nose: Nose normal.   Mouth/Throat: Oropharynx is clear and moist.   Eyes: EOM are normal. Pupils are equal, round, and reactive to light.   Neck: No JVD present. No tracheal deviation present. No thyromegaly present.   Cardiovascular: Normal rate and regular rhythm.    No murmur heard.  Pulmonary/Chest: Effort normal. No respiratory distress. She has no wheezes. She has no rales.   Resonant to percussion.  Severely decreased breath sounds but clear   Abdominal: Soft.   Musculoskeletal: She exhibits no edema or deformity.   Lymphadenopathy:     She has no cervical adenopathy.   Neurological: She is alert and oriented to person, place, and time.   Skin: Skin is warm and dry. No rash noted. She is not diaphoretic.   Psychiatric: She has a normal mood and affect. Her behavior is normal. Thought content normal.       Assessment/Plan      1.  End-stage COPD  2.  Hypoxemia  3.  History of allergies  4.  Osteoporosis  5.  Chronic steroid use  Discussion;  Patient is minimally changed since I last saw her.  I really do not think we are going to have any luck getting her off prednisone.  I recommended she take 10 mg a day for a month and if she stable at that time dropped to 5 mg a day.  She should continue current oxygen.  She should continue current bronchodilators.    She should seek prompt medical attention and amount of worsening of her breathing.  Unfortunately long-term prognosis is extremely poor.    I like to reassess her in 3 months.

## 2018-07-20 NOTE — PATIENT INSTRUCTIONS
Stop pulmicort  Leave prednisone at 10mg a day for a month then decrease to 5 mg a day  Continue oxygen and other medications.

## 2018-07-20 NOTE — LETTER
MEDICAL CLINIC PULMONOLOGY  [address] [facility] [place] [address]  [phone]  Dept: [phone]  July 20, 2018     [address] [place]  [place] [address]    Patient: Denita Spring   YOB: 1960   Date of Visit: 7/20/2018       Dear Dr. Ford:    Our mutual patient, Deniat Spring, was seen in my office on 7/20/2018. Below are my notes.     JANICE RAPHAEL MD  7/20/2018  5:22 PM  Signed  Subjective      Patient ID: Denita Spring is a 58 y.o. female.    HPI  Patient is in for follow-up.  She has been evaluated by [facility] lung transplant.  She is not a candidate for lung transplantation.  She had significant tracheobronchomalacia on CT scan and they were concerned about deconditioning and long-term steroid needs.    Clinically she has not required admission to the hospital since February.  She is try to taper prednisone but does not have much luck getting below 10 mg a day.  She continues to have some cough.  She has had no significant sputum production.  She did get a Z-Ben in June.  Present prednisone dose is 10 mg a day.  She remains on theophylline Spiriva Daliresp and Advair.  She is also using Allegra and Flonase.    He had recent shingles are healing.    Functional status remains poor.  The following have been reviewed and updated as appropriate in this visit:  No text in SmartText       Review of Systems   Constitutional: Positive for fatigue. Negative for chills, diaphoresis, fever and unexpected weight change.   HENT: Positive for postnasal drip, rhinorrhea and sore throat (recent strep throat).    Eyes: Negative.    Respiratory: Positive for cough, shortness of breath and wheezing.    Cardiovascular: Positive for leg swelling.   Gastrointestinal: Negative.    Endocrine: Negative.    Genitourinary: Positive for dysuria.   Musculoskeletal: Positive for arthralgias.   Allergic/Immunologic: Positive for environmental allergies.   Neurological:  "Negative for syncope and light-headedness.   Hematological: Negative for adenopathy. Does not bruise/bleed easily.   Psychiatric/Behavioral: Negative.      /72 (BP Location: Right arm, Patient Position: Sitting, Cuff Size: Reg)   Pulse 117   Ht 1.6 m (5' 3\")   Wt 74.8 kg (165 lb)   SpO2 97%   BMI 29.23 kg/m²        Objective     Physical Exam   Constitutional: She is oriented to person, place, and time. She appears well-developed. No distress.   HENT:   Head: Normocephalic.   Right Ear: External ear normal.   Left Ear: External ear normal.   Nose: Nose normal.   Mouth/Throat: Oropharynx is clear and moist.   Eyes: EOM are normal. Pupils are equal, round, and reactive to light.   Neck: No JVD present. No tracheal deviation present. No thyromegaly present.   Cardiovascular: Normal rate and regular rhythm.    No murmur heard.  Pulmonary/Chest: Effort normal. No respiratory distress. She has no wheezes. She has no rales.   Resonant to percussion.  Severely decreased breath sounds but clear   Abdominal: Soft.   Musculoskeletal: She exhibits no edema or deformity.   Lymphadenopathy:     She has no cervical adenopathy.   Neurological: She is alert and oriented to person, place, and time.   Skin: Skin is warm and dry. No rash noted. She is not diaphoretic.   Psychiatric: She has a normal mood and affect. Her behavior is normal. Thought content normal.       Assessment/Plan      1.  End-stage COPD  2.  Hypoxemia  3.  History of allergies  4.  Osteoporosis  5.  Chronic steroid use  Discussion;  Patient is minimally changed since I last saw her.  I really do not think we are going to have any luck getting her off prednisone.  I recommended she take 10 mg a day for a month and if she stable at that time dropped to 5 mg a day.  She should continue current oxygen.  She should continue current bronchodilators.    She should seek prompt medical attention and amount of worsening of her breathing.  Unfortunately long-term " prognosis is extremely poor.    I like to reassess her in 3 months.               If you have questions, please do not hesitate to call me. I look forward to following your patient along with you.         Sincerely,        JANICE RAPHAEL MD        CC: No Recipients

## 2018-07-24 ENCOUNTER — APPOINTMENT (OUTPATIENT)
Dept: RADIOLOGY | Facility: HOSPITAL | Age: 58
DRG: 190 | End: 2018-07-24
Payer: COMMERCIAL

## 2018-07-24 ENCOUNTER — HOSPITAL ENCOUNTER (INPATIENT)
Facility: HOSPITAL | Age: 58
LOS: 4 days | Discharge: HOME | DRG: 190 | End: 2018-07-28
Attending: EMERGENCY MEDICINE | Admitting: INTERNAL MEDICINE
Payer: COMMERCIAL

## 2018-07-24 ENCOUNTER — TELEPHONE (OUTPATIENT)
Dept: PULMONOLOGY | Facility: CLINIC | Age: 58
End: 2018-07-24

## 2018-07-24 DIAGNOSIS — J96.22 ACUTE ON CHRONIC RESPIRATORY FAILURE WITH HYPOXIA AND HYPERCAPNIA (CMS/HCC): ICD-10-CM

## 2018-07-24 DIAGNOSIS — J44.1 COPD EXACERBATION (CMS/HCC): Primary | ICD-10-CM

## 2018-07-24 DIAGNOSIS — J96.21 ACUTE ON CHRONIC RESPIRATORY FAILURE WITH HYPOXIA AND HYPERCAPNIA (CMS/HCC): ICD-10-CM

## 2018-07-24 PROBLEM — J43.2 CENTRILOBULAR EMPHYSEMA (CMS/HCC): Status: ACTIVE | Noted: 2018-05-22

## 2018-07-24 LAB
B PERT DNA SPEC QL NAA+PROBE: NEGATIVE
B PERT DNA SPEC QL NAA+PROBE: NEGATIVE
BASE EXCESS BLDA CALC-SCNC: 0.2 MMOL/L (ref -2–2)
C PNEUM DNA SPEC QL NAA+PROBE: NEGATIVE
CO2 BLDA-SCNC: 27.4 MMOL/L (ref 19–24)
EPITHELIAL CELLS ON REPIRATORY GRAM STAIN /LPF: NORMAL /LPF
FLUAV RNA SPEC NAA+PROBE-ACNC: NEGATIVE
FLUBV RNA SPEC QL NAA+PROBE: NEGATIVE
GLUCOSE BLDC GLUCOMTR-MCNC: 122 MG/DL (ref 70–105)
GLUCOSE BLDC GLUCOMTR-MCNC: 150 MG/DL (ref 70–105)
GLUCOSE BLDC GLUCOMTR-MCNC: 171 MG/DL (ref 70–105)
HADV DNA SPEC QL NAA+PROBE: NEGATIVE
HCO3 BLDA-SCNC: 26 MMOL/L (ref 23–29)
HCOV 229E RNA SPEC QL NAA+PROBE: NEGATIVE
HCOV HKU1 RNA SPEC QL NAA+PROBE: NEGATIVE
HCOV NL63 RNA SPEC QL NAA+PROBE: NEGATIVE
HCOV OC43 RNA SPEC QL NAA+PROBE: NEGATIVE
HMPV RNA ISLT QL NAA+PROBE: NEGATIVE
HPIV1 RNA SPEC QL NAA+PROBE: NEGATIVE
HPIV2 RNA SPEC QL NAA+PROBE: NEGATIVE
HPIV3 RNA SPEC QL NAA+PROBE: NEGATIVE
HPIV4 RNA SPEC QL NAA+PROBE: NEGATIVE
M PNEUMO DNA # SPEC NAA+PROBE: NEGATIVE {COPIES}/ML
MUCUS PRESENCE IN REPIRATORY GRAM STAIN: NORMAL
ORGANISM PREDOMINANCE IN REPIRATORY GRAM STAIN: NORMAL
OVERALL GRADE OF RESPIRATORY GRAM STAIN: NORMAL
PCO2 BLDA: 46.7 MMHG (ref 35–45)
PH BLDA: 7.36 PH (ref 7.35–7.45)
PMNS ON RESPIRATORY GRAM STAIN /LPF: NORMAL /LPF
PO2 BLDA: 103.4 MMHG (ref 60–80)
POCT CTO2, ARTERIAL: 15.9 ML/DL (ref 15–24)
POCT HEMOGLOBIN (MEASURED), ARTERIAL: 11.5 G/DL (ref 11.5–15.5)
POCT OXYHEMOGLOBIN, ARTERIAL: 97 %
PROCALCITONIN SERPL-MCNC: 0.36 NG/ML
REPIRATORY GRAM STAIN INTERP: NORMAL
RSV A RNA SPEC QL NAA+PROBE: NEGATIVE
RV+EV RNA SPEC QL NAA+PROBE: NEGATIVE

## 2018-07-24 PROCEDURE — 82962 GLUCOSE BLOOD TEST: CPT

## 2018-07-24 PROCEDURE — 99291 CRITICAL CARE FIRST HOUR: CPT | Mod: AI,GC | Performed by: STUDENT IN AN ORGANIZED HEALTH CARE EDUCATION/TRAINING PROGRAM

## 2018-07-24 PROCEDURE — 99291 CRITICAL CARE FIRST HOUR: CPT | Performed by: EMERGENCY MEDICINE

## 2018-07-24 PROCEDURE — 6360000200 HC RX 636 W HCPCS (ALT 250 FOR IP)

## 2018-07-24 PROCEDURE — 94660 CPAP INITIATION&MGMT: CPT

## 2018-07-24 PROCEDURE — (BLANK) HC ROOM PRIVATE

## 2018-07-24 PROCEDURE — 94640 AIRWAY INHALATION TREATMENT: CPT

## 2018-07-24 PROCEDURE — 6360000200 HC RX 636 W HCPCS (ALT 250 FOR IP): Performed by: STUDENT IN AN ORGANIZED HEALTH CARE EDUCATION/TRAINING PROGRAM

## 2018-07-24 PROCEDURE — 6370000100 HC RX 637 (ALT 250 FOR IP): Performed by: STUDENT IN AN ORGANIZED HEALTH CARE EDUCATION/TRAINING PROGRAM

## 2018-07-24 PROCEDURE — 87205 SMEAR GRAM STAIN: CPT | Performed by: STUDENT IN AN ORGANIZED HEALTH CARE EDUCATION/TRAINING PROGRAM

## 2018-07-24 PROCEDURE — 99233 SBSQ HOSP IP/OBS HIGH 50: CPT | Performed by: INTERNAL MEDICINE

## 2018-07-24 PROCEDURE — 99285 EMERGENCY DEPT VISIT HI MDM: CPT

## 2018-07-24 PROCEDURE — 87798 DETECT AGENT NOS DNA AMP: CPT | Performed by: STUDENT IN AN ORGANIZED HEALTH CARE EDUCATION/TRAINING PROGRAM

## 2018-07-24 PROCEDURE — 94799 UNLISTED PULMONARY SVC/PX: CPT

## 2018-07-24 PROCEDURE — 36600 WITHDRAWAL OF ARTERIAL BLOOD: CPT

## 2018-07-24 PROCEDURE — 71045 X-RAY EXAM CHEST 1 VIEW: CPT

## 2018-07-24 PROCEDURE — 6370000100 HC RX 637 (ALT 250 FOR IP): Performed by: INTERNAL MEDICINE

## 2018-07-24 PROCEDURE — 93005 ELECTROCARDIOGRAM TRACING: CPT | Performed by: EMERGENCY MEDICINE

## 2018-07-24 PROCEDURE — 82805 BLOOD GASES W/O2 SATURATION: CPT | Mod: QW

## 2018-07-24 PROCEDURE — 6360000200 HC RX 636 W HCPCS (ALT 250 FOR IP): Performed by: EMERGENCY MEDICINE

## 2018-07-24 PROCEDURE — 2580000300 HC RX 258: Performed by: STUDENT IN AN ORGANIZED HEALTH CARE EDUCATION/TRAINING PROGRAM

## 2018-07-24 PROCEDURE — 36415 COLL VENOUS BLD VENIPUNCTURE: CPT | Performed by: INTERNAL MEDICINE

## 2018-07-24 PROCEDURE — 97161 PT EVAL LOW COMPLEX 20 MIN: CPT | Mod: GP

## 2018-07-24 PROCEDURE — 84145 PROCALCITONIN (PCT): CPT | Performed by: INTERNAL MEDICINE

## 2018-07-24 PROCEDURE — 87070 CULTURE OTHR SPECIMN AEROBIC: CPT | Performed by: STUDENT IN AN ORGANIZED HEALTH CARE EDUCATION/TRAINING PROGRAM

## 2018-07-24 PROCEDURE — 97530 THERAPEUTIC ACTIVITIES: CPT | Mod: GO

## 2018-07-24 PROCEDURE — 97165 OT EVAL LOW COMPLEX 30 MIN: CPT | Mod: GO

## 2018-07-24 RX ORDER — ALBUTEROL SULFATE 5 MG/ML
2.5 SOLUTION RESPIRATORY (INHALATION) CONTINUOUS
Status: DISCONTINUED | OUTPATIENT
Start: 2018-07-24 | End: 2018-07-24

## 2018-07-24 RX ORDER — PREDNISONE 20 MG/1
40 TABLET ORAL DAILY
Status: COMPLETED | OUTPATIENT
Start: 2018-07-25 | End: 2018-07-27

## 2018-07-24 RX ORDER — LORATADINE 10 MG/1
5 TABLET ORAL 3 TIMES DAILY
Status: DISCONTINUED | OUTPATIENT
Start: 2018-07-24 | End: 2018-07-24

## 2018-07-24 RX ORDER — METOPROLOL TARTRATE 25 MG/1
25 TABLET, FILM COATED ORAL 2 TIMES DAILY
Status: DISCONTINUED | OUTPATIENT
Start: 2018-07-24 | End: 2018-07-28 | Stop reason: HOSPADM

## 2018-07-24 RX ORDER — ESOMEPRAZOLE MAGNESIUM 40 MG/1
40 CAPSULE, DELAYED RELEASE ORAL
Status: DISCONTINUED | OUTPATIENT
Start: 2018-07-24 | End: 2018-07-28 | Stop reason: HOSPADM

## 2018-07-24 RX ORDER — ASCORBIC ACID 500 MG
500 TABLET ORAL 2 TIMES DAILY
Status: DISCONTINUED | OUTPATIENT
Start: 2018-07-24 | End: 2018-07-28 | Stop reason: HOSPADM

## 2018-07-24 RX ORDER — ACETAMINOPHEN 325 MG/1
650 TABLET ORAL 4 TIMES DAILY PRN
COMMUNITY

## 2018-07-24 RX ORDER — ALBUTEROL SULFATE 90 UG/1
2 INHALANT RESPIRATORY (INHALATION) EVERY 4 HOURS PRN
COMMUNITY
End: 2023-05-01 | Stop reason: SDUPTHER

## 2018-07-24 RX ORDER — LEVALBUTEROL 1.25 MG/.5ML
1.25 SOLUTION, CONCENTRATE RESPIRATORY (INHALATION) 4 TIMES DAILY
Status: DISCONTINUED | OUTPATIENT
Start: 2018-07-24 | End: 2018-07-24

## 2018-07-24 RX ORDER — FLUTICASONE PROPIONATE 50 MCG
1 SPRAY, SUSPENSION (ML) NASAL
Status: DISCONTINUED | OUTPATIENT
Start: 2018-07-24 | End: 2018-07-24

## 2018-07-24 RX ORDER — CETIRIZINE HYDROCHLORIDE 10 MG/1
10 TABLET ORAL DAILY
Status: ON HOLD | COMMUNITY
End: 2023-06-22

## 2018-07-24 RX ORDER — ROFLUMILAST 500 UG/1
500 TABLET ORAL
Status: DISCONTINUED | OUTPATIENT
Start: 2018-07-24 | End: 2018-07-28 | Stop reason: HOSPADM

## 2018-07-24 RX ORDER — AMOXICILLIN AND CLAVULANATE POTASSIUM 875; 125 MG/1; MG/1
1 TABLET, FILM COATED ORAL 2 TIMES DAILY WITH MEALS
Status: DISCONTINUED | OUTPATIENT
Start: 2018-07-24 | End: 2018-07-28 | Stop reason: HOSPADM

## 2018-07-24 RX ORDER — GUAIFENESIN 600 MG/1
600 TABLET, EXTENDED RELEASE ORAL 3 TIMES DAILY PRN
COMMUNITY

## 2018-07-24 RX ORDER — IPRATROPIUM BROMIDE AND ALBUTEROL SULFATE 2.5; .5 MG/3ML; MG/3ML
3 SOLUTION RESPIRATORY (INHALATION) ONCE
Status: COMPLETED | OUTPATIENT
Start: 2018-07-24 | End: 2018-07-24

## 2018-07-24 RX ORDER — IPRATROPIUM BROMIDE 0.5 MG/2.5ML
0.5 SOLUTION RESPIRATORY (INHALATION)
Status: DISCONTINUED | OUTPATIENT
Start: 2018-07-24 | End: 2018-07-26 | Stop reason: ALTCHOICE

## 2018-07-24 RX ORDER — MONTELUKAST SODIUM 10 MG/1
10 TABLET ORAL NIGHTLY
Status: DISCONTINUED | OUTPATIENT
Start: 2018-07-24 | End: 2018-07-28 | Stop reason: HOSPADM

## 2018-07-24 RX ORDER — ALPRAZOLAM 0.5 MG/1
0.5 TABLET ORAL ONCE
Status: COMPLETED | OUTPATIENT
Start: 2018-07-24 | End: 2018-07-24

## 2018-07-24 RX ORDER — SIMETHICONE 80 MG
80 TABLET,CHEWABLE ORAL 4 TIMES DAILY PRN
COMMUNITY

## 2018-07-24 RX ORDER — MAGNESIUM SULFATE HEPTAHYDRATE 40 MG/ML
2 INJECTION, SOLUTION INTRAVENOUS ONCE
Status: COMPLETED | OUTPATIENT
Start: 2018-07-24 | End: 2018-07-24

## 2018-07-24 RX ORDER — ONDANSETRON HYDROCHLORIDE 2 MG/ML
4 INJECTION, SOLUTION INTRAVENOUS EVERY 6 HOURS PRN
Status: DISCONTINUED | OUTPATIENT
Start: 2018-07-24 | End: 2018-07-28 | Stop reason: HOSPADM

## 2018-07-24 RX ORDER — BUDESONIDE AND FORMOTEROL FUMARATE DIHYDRATE 160; 4.5 UG/1; UG/1
2 AEROSOL RESPIRATORY (INHALATION)
Status: DISCONTINUED | OUTPATIENT
Start: 2018-07-24 | End: 2018-07-24

## 2018-07-24 RX ORDER — HYDROCORTISONE 1 %
CREAM (GRAM) TOPICAL 2 TIMES DAILY
Status: DISCONTINUED | OUTPATIENT
Start: 2018-07-24 | End: 2018-07-28 | Stop reason: HOSPADM

## 2018-07-24 RX ORDER — ENOXAPARIN SODIUM 100 MG/ML
40 INJECTION SUBCUTANEOUS
Status: DISCONTINUED | OUTPATIENT
Start: 2018-07-24 | End: 2018-07-28 | Stop reason: HOSPADM

## 2018-07-24 RX ORDER — FENTANYL CITRATE/PF 50 MCG/ML
25 PLASTIC BAG, INJECTION (ML) INTRAVENOUS
Status: DISCONTINUED | OUTPATIENT
Start: 2018-07-24 | End: 2018-07-28 | Stop reason: HOSPADM

## 2018-07-24 RX ORDER — DILTIAZEM HYDROCHLORIDE 240 MG/1
240 CAPSULE, COATED, EXTENDED RELEASE ORAL DAILY
Status: DISCONTINUED | OUTPATIENT
Start: 2018-07-24 | End: 2018-07-24

## 2018-07-24 RX ORDER — TIOTROPIUM BROMIDE 18 UG/1
1 CAPSULE ORAL; RESPIRATORY (INHALATION) EVERY MORNING
Status: DISCONTINUED | OUTPATIENT
Start: 2018-07-24 | End: 2018-07-24

## 2018-07-24 RX ORDER — LORATADINE 10 MG/1
10 TABLET ORAL DAILY
Status: ON HOLD | COMMUNITY
End: 2018-07-24 | Stop reason: SDUPTHER

## 2018-07-24 RX ORDER — ALPRAZOLAM 0.5 MG/1
1 TABLET ORAL NIGHTLY PRN
Status: DISCONTINUED | OUTPATIENT
Start: 2018-07-24 | End: 2018-07-24

## 2018-07-24 RX ORDER — ALPRAZOLAM 0.25 MG/1
0.25 TABLET ORAL NIGHTLY PRN
Status: DISCONTINUED | OUTPATIENT
Start: 2018-07-24 | End: 2018-07-28 | Stop reason: HOSPADM

## 2018-07-24 RX ORDER — SODIUM CHLORIDE, SODIUM LACTATE, POTASSIUM CHLORIDE, CALCIUM CHLORIDE 600; 310; 30; 20 MG/100ML; MG/100ML; MG/100ML; MG/100ML
100 INJECTION, SOLUTION INTRAVENOUS CONTINUOUS
Status: DISCONTINUED | OUTPATIENT
Start: 2018-07-24 | End: 2018-07-24

## 2018-07-24 RX ORDER — METFORMIN HYDROCHLORIDE 500 MG/1
500 TABLET ORAL 2 TIMES DAILY WITH MEALS
Status: ON HOLD | COMMUNITY
End: 2023-06-22

## 2018-07-24 RX ORDER — LEVALBUTEROL 1.25 MG/.5ML
1.25 SOLUTION, CONCENTRATE RESPIRATORY (INHALATION)
Status: DISCONTINUED | OUTPATIENT
Start: 2018-07-24 | End: 2018-07-28 | Stop reason: HOSPADM

## 2018-07-24 RX ORDER — FERROUS SULFATE 325(65) MG
325 TABLET ORAL DAILY
COMMUNITY

## 2018-07-24 RX ORDER — BUDESONIDE 1 MG/2ML
1 INHALANT ORAL
Status: DISCONTINUED | OUTPATIENT
Start: 2018-07-24 | End: 2018-07-26 | Stop reason: ALTCHOICE

## 2018-07-24 RX ORDER — DOCUSATE SODIUM 100 MG/1
100 CAPSULE, LIQUID FILLED ORAL 2 TIMES DAILY PRN
Status: DISCONTINUED | OUTPATIENT
Start: 2018-07-24 | End: 2018-07-28 | Stop reason: HOSPADM

## 2018-07-24 RX ORDER — NYSTATIN 100000 U/G
1 CREAM TOPICAL 3 TIMES DAILY
Status: ON HOLD | COMMUNITY
End: 2023-06-22

## 2018-07-24 RX ORDER — IBUPROFEN 800 MG/1
800 TABLET ORAL EVERY 8 HOURS PRN
Status: ON HOLD | COMMUNITY
End: 2023-06-22

## 2018-07-24 RX ORDER — PREDNISONE 20 MG/1
20 TABLET ORAL DAILY
Status: DISCONTINUED | OUTPATIENT
Start: 2018-07-28 | End: 2018-07-28 | Stop reason: HOSPADM

## 2018-07-24 RX ORDER — BUDESONIDE 0.5 MG/2ML
1 INHALANT ORAL 2 TIMES DAILY
COMMUNITY
End: 2021-12-02 | Stop reason: ALTCHOICE

## 2018-07-24 RX ORDER — THEOPHYLLINE 200 MG/1
200 TABLET, EXTENDED RELEASE ORAL 2 TIMES DAILY
Status: DISCONTINUED | OUTPATIENT
Start: 2018-07-24 | End: 2018-07-24

## 2018-07-24 RX ORDER — LORATADINE 10 MG/1
10 TABLET ORAL DAILY
Status: DISCONTINUED | OUTPATIENT
Start: 2018-07-24 | End: 2018-07-28 | Stop reason: HOSPADM

## 2018-07-24 RX ORDER — ESOMEPRAZOLE SODIUM 40 MG/1
40 INJECTION, POWDER, LYOPHILIZED, FOR SOLUTION INTRAVENOUS
Status: DISCONTINUED | OUTPATIENT
Start: 2018-07-24 | End: 2018-07-24

## 2018-07-24 RX ORDER — MAGNESIUM SULFATE HEPTAHYDRATE 40 MG/ML
2 INJECTION, SOLUTION INTRAVENOUS ONCE AS NEEDED
Status: DISCONTINUED | OUTPATIENT
Start: 2018-07-24 | End: 2018-07-28 | Stop reason: HOSPADM

## 2018-07-24 RX ORDER — HYDROCORTISONE 1 %
1 CREAM (GRAM) TOPICAL 2 TIMES DAILY PRN
Status: ON HOLD | COMMUNITY
End: 2023-06-22

## 2018-07-24 RX ORDER — FENTANYL CITRATE/PF 50 MCG/ML
50 PLASTIC BAG, INJECTION (ML) INTRAVENOUS
Status: DISCONTINUED | OUTPATIENT
Start: 2018-07-24 | End: 2018-07-28 | Stop reason: HOSPADM

## 2018-07-24 RX ORDER — POLYVINYL ALCOHOL 1.4 %
1-2 DROPS OPHTHALMIC (EYE) AS NEEDED
Status: DISCONTINUED | OUTPATIENT
Start: 2018-07-24 | End: 2018-07-28 | Stop reason: HOSPADM

## 2018-07-24 RX ORDER — ALBUTEROL SULFATE 0.83 MG/ML
SOLUTION RESPIRATORY (INHALATION)
Status: COMPLETED
Start: 2018-07-24 | End: 2018-07-24

## 2018-07-24 RX ORDER — FLUTICASONE PROPIONATE 50 MCG
1 SPRAY, SUSPENSION (ML) NASAL 2 TIMES DAILY
Status: DISCONTINUED | OUTPATIENT
Start: 2018-07-24 | End: 2018-07-28 | Stop reason: HOSPADM

## 2018-07-24 RX ADMIN — SODIUM CHLORIDE, POTASSIUM CHLORIDE, SODIUM LACTATE AND CALCIUM CHLORIDE 100 ML/HR: 600; 310; 30; 20 INJECTION, SOLUTION INTRAVENOUS at 05:00

## 2018-07-24 RX ADMIN — ALPRAZOLAM 0.5 MG: 0.5 TABLET ORAL at 04:47

## 2018-07-24 RX ADMIN — FLUTICASONE PROPIONATE 1 SPRAY: 50 SPRAY, METERED NASAL at 12:35

## 2018-07-24 RX ADMIN — IPRATROPIUM BROMIDE 0.5 MG: 0.5 SOLUTION RESPIRATORY (INHALATION) at 14:55

## 2018-07-24 RX ADMIN — MINERAL OIL AND WHITE PETROLATUM 1 APPLICATION: 30; 940 OINTMENT OPHTHALMIC at 20:58

## 2018-07-24 RX ADMIN — LORATADINE 10 MG: 10 TABLET ORAL at 10:30

## 2018-07-24 RX ADMIN — AMOXICILLIN AND CLAVULANATE POTASSIUM 1 TABLET: 875; 125 TABLET, FILM COATED ORAL at 18:00

## 2018-07-24 RX ADMIN — METOPROLOL TARTRATE 25 MG: 25 TABLET ORAL at 10:24

## 2018-07-24 RX ADMIN — ESOMEPRAZOLE MAGNESIUM 40 MG: 40 CAPSULE, DELAYED RELEASE ORAL at 16:00

## 2018-07-24 RX ADMIN — BUDESONIDE 1 MG: 1 SUSPENSION RESPIRATORY (INHALATION) at 14:55

## 2018-07-24 RX ADMIN — IPRATROPIUM BROMIDE 0.5 MG: 0.5 SOLUTION RESPIRATORY (INHALATION) at 08:48

## 2018-07-24 RX ADMIN — DILTIAZEM HYDROCHLORIDE 240 MG: 240 CAPSULE, COATED, EXTENDED RELEASE ORAL at 10:24

## 2018-07-24 RX ADMIN — MAGNESIUM SULFATE HEPTAHYDRATE 2 G: 40 INJECTION, SOLUTION INTRAVENOUS at 04:15

## 2018-07-24 RX ADMIN — IPRATROPIUM BROMIDE AND ALBUTEROL SULFATE 3 ML: .5; 3 SOLUTION RESPIRATORY (INHALATION) at 02:35

## 2018-07-24 RX ADMIN — LEVALBUTEROL 1.25 MG: 1.25 SOLUTION, CONCENTRATE RESPIRATORY (INHALATION) at 14:55

## 2018-07-24 RX ADMIN — OXYCODONE HYDROCHLORIDE AND ACETAMINOPHEN 500 MG: 500 TABLET ORAL at 20:58

## 2018-07-24 RX ADMIN — BUDESONIDE 1 MG: 1 SUSPENSION RESPIRATORY (INHALATION) at 19:43

## 2018-07-24 RX ADMIN — AZITHROMYCIN MONOHYDRATE 500 MG: 500 INJECTION, POWDER, LYOPHILIZED, FOR SOLUTION INTRAVENOUS at 05:00

## 2018-07-24 RX ADMIN — MONTELUKAST SODIUM 10 MG: 10 TABLET, FILM COATED ORAL at 20:58

## 2018-07-24 RX ADMIN — METHYLPREDNISOLONE SODIUM SUCCINATE 40 MG: 40 INJECTION, POWDER, FOR SOLUTION INTRAMUSCULAR; INTRAVENOUS at 04:46

## 2018-07-24 RX ADMIN — METHYLPREDNISOLONE SODIUM SUCCINATE 40 MG: 40 INJECTION, POWDER, FOR SOLUTION INTRAMUSCULAR; INTRAVENOUS at 21:08

## 2018-07-24 RX ADMIN — ENOXAPARIN SODIUM 40 MG: 100 INJECTION SUBCUTANEOUS at 14:45

## 2018-07-24 RX ADMIN — OXYCODONE HYDROCHLORIDE AND ACETAMINOPHEN 500 MG: 500 TABLET ORAL at 09:00

## 2018-07-24 RX ADMIN — METHYLPREDNISOLONE SODIUM SUCCINATE 40 MG: 40 INJECTION, POWDER, FOR SOLUTION INTRAMUSCULAR; INTRAVENOUS at 16:00

## 2018-07-24 RX ADMIN — METHYLPREDNISOLONE SODIUM SUCCINATE 40 MG: 40 INJECTION, POWDER, FOR SOLUTION INTRAMUSCULAR; INTRAVENOUS at 10:24

## 2018-07-24 RX ADMIN — AMOXICILLIN AND CLAVULANATE POTASSIUM 1 TABLET: 875; 125 TABLET, FILM COATED ORAL at 10:24

## 2018-07-24 RX ADMIN — IPRATROPIUM BROMIDE 0.5 MG: 0.5 SOLUTION RESPIRATORY (INHALATION) at 19:43

## 2018-07-24 RX ADMIN — ROFLUMILAST 500 MCG: 500 TABLET ORAL at 12:39

## 2018-07-24 RX ADMIN — LEVALBUTEROL 1.25 MG: 1.25 SOLUTION, CONCENTRATE RESPIRATORY (INHALATION) at 08:48

## 2018-07-24 RX ADMIN — THEOPHYLLINE ANHYDROUS 400 MG: 200 CAPSULE, EXTENDED RELEASE ORAL at 12:34

## 2018-07-24 RX ADMIN — ALBUTEROL SULFATE 2.5 MG: 2.5 SOLUTION RESPIRATORY (INHALATION) at 05:06

## 2018-07-24 RX ADMIN — LEVALBUTEROL 1.25 MG: 1.25 SOLUTION, CONCENTRATE RESPIRATORY (INHALATION) at 19:43

## 2018-07-24 ASSESSMENT — ENCOUNTER SYMPTOMS
ABDOMINAL PAIN: 0
RHINORRHEA: 1
WEAKNESS: 1
SLEEP DISTURBANCE: 1
ARTHRALGIAS: 0
DIZZINESS: 1
NAUSEA: 1
COUGH: 1
FEVER: 1
SINUS PAIN: 1
DYSURIA: 0
SINUS PRESSURE: 1
PALPITATIONS: 1
CHILLS: 1
CHEST TIGHTNESS: 1
HEADACHES: 0
SORE THROAT: 1
SHORTNESS OF BREATH: 1
FATIGUE: 1

## 2018-07-24 NOTE — TELEPHONE ENCOUNTER
I do not know what correspond she is referring to.  The only thing I received is that they did not feel she was a candidate for transplant.  She can call her transplant coordinator.

## 2018-07-24 NOTE — ED PROVIDER NOTES
Shortness of Breath and Rapid Heart Rate        HPI:    Patient is a 58 y.o. female who presents with shortness of breath for the last 3-4 days.  She was seen in outside facility given multiple nebs along with Solu-Medrol.  She was transferred by flight and given 2 more nebs en route but continues to be quite tachypneic.  Nothing makes this better or worse.    Past Medical History:   Diagnosis Date   • Arthritis    • Asthma    • Cancer (CMS/[facility])     cervical CA   • COPD (chronic obstructive pulmonary disease) (CMS/[facility])    • History of transfusion    • Obesity (BMI 30.0-34.9) 2018   • Osteoporosis    • Pneumonia    • Wears dentures        Past Surgical History:   Procedure Laterality Date   • CERVICAL BIOPSY     •  SECTION      x4   • COLONOSCOPY  10/01/2016   • COLONOSCOPY  2004   • COSMETIC SURGERY     • OTHER SURGICAL HISTORY      Cervical ablation for HPV   • OTHER SURGICAL HISTORY      Endotracheal tube placement   • OTHER SURGICAL HISTORY      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     Social History Main Topics   • Smoking status: Former Smoker     Packs/day: 1.00     Quit date:    • Smokeless tobacco: Former User   • Alcohol use No   • Drug use: No   • Sexual activity: Defer     Other Topics Concern   • Not on file     Social History Narrative   • No narrative on file       Family History   Problem Relation Age of Onset   • Diabetes Mother    • Hypertension Father    • Cataracts Father    • Other Father         Benign prostatic hypertrophy witout outflow obstruction   • Heart disease Brother        Allergies   Allergen Reactions   •  Diphenhydramine      DENIES   • Cefuroxime      SUNBURN   • Cefuroxime Axetil    • Diphenhydramine Hcl    • Erythromycin Lactobionate    • Methylphenidate      ON FIRE   • Metoclopramide      dizzy, sweating   • Propoxyphene    • Propoxyphene N-Acetaminophen    • Tramadol      PARALYSIS   • Codeine      oxygen gets really low   • Erythromycin Base      GI UPSET   • Ibuprofen      DENIES   • Latex      DRY MOUTH         Current Outpatient Prescriptions:   •  albuterol 2.5 mg /3 mL nebulizer solution, Take 3 mL (2.5 mg total) by nebulization every 6 (six) hours as needed for wheezing., Disp: 75 mL, Rfl: 1  •  alendronate (FOSAMAX) 70 mg tablet, Take 70 mg by mouth every 7 (seven) days. THURSDAY , Disp: , Rfl:   •  ALPRAZolam (XANAX) 0.25 mg tablet, Take 0.25 mg by mouth nightly as needed for anxiety., Disp: , Rfl:   •  ascorbic acid, vitamin C, (VITAMIN C) 500 mg tablet, Take 500 mg by mouth 2 (two) times a day.  , Disp: , Rfl:   •  budesonide (PULMICORT) 1 mg/2 mL nebulizer solution, Take 2 mL by nebulization 2 (two) times a day.  , Disp:  mL, Rfl:   •  diltiazem CD (CARDIZEM CD) 240 mg 24 hr capsule, Take 1 capsule (240 mg total) by mouth daily. (Patient not taking: Reported on 7/20/2018 ), Disp: 30 capsule, Rfl: 0  •  docusate sodium (COLACE) 100 mg capsule, Take 100 mg by mouth 2 (two) times a day. Takes after lunch and at bedtime , Disp: , Rfl:   •  ergocalciferol (VITAMIN D2) 50,000 unit capsule, Take 50,000 Units by mouth once a week. THURSDAY, Disp: , Rfl:   •  fexofenadine (ALLEGRA) 60 mg tablet, Take 60 mg by mouth 3 (three) times a day.  , Disp: , Rfl:   •  fluticasone (FLONASE) 50 mcg/actuation nasal spray, Administer 1 spray into each nostril 3 (three) times a day.  , Disp: , Rfl:   •  fluticasone-salmeterol (ADVAIR DISKUS) 500-50 mcg/dose diskus inhaler, Inhale 2 (two) times a day.  , Disp: , Rfl:   •  guaiFENesin (MUCINEX) 600 mg 12 hr tablet, Take 1 tablet (600 mg total) by mouth 2 (two) times a day.,  Disp: 60 tablet, Rfl: 0  •  metoprolol tartrate (LOPRESSOR) 25 mg tablet, Take 1 tablet (25 mg total) by mouth 2 (two) times a day. (Patient not taking: Reported on 7/20/2018 ), Disp: 60 tablet, Rfl: 0  •  montelukast (SINGULAIR) 10 mg tablet, Take 10 mg by mouth nightly.  , Disp: , Rfl:   •  multivitamin (THERAGRAN) tablet tablet, Take 1 tablet by mouth daily with lunch.  , Disp: , Rfl:   •  nystatin (MYCOSTATIN) 100,000 unit/mL suspension, Take 4 mL (400,000 Units total) by mouth 4 (four) times a day., Disp: 300 mL, Rfl: 0  •  omeprazole (PriLOSEC) 20 mg capsule, Take 20 mg by mouth 2 (two) times a day.  , Disp: , Rfl:   •  predniSONE (DELTASONE) 10 mg tablet, Take 10 mg by mouth daily as needed. Left over prednisone , Disp: , Rfl:   •  predniSONE (DELTASONE) 10 mg tablet, Take 4 tabs PO daily (40 mg) for 2 days. Then 3 tab PO daily (30 mg) for 2 days. Then 2 tab PO daily (20 Mg) for 2 days.Then 1 tab PO daily (Patient taking differently: 20 mg daily. Take 4 tabs PO daily (40 mg) for 2 days. Then 3 tab PO daily (30 mg) for 2 days. Then 2 tab PO daily (20 Mg) for 2 days.Then 1 tab PO daily ), Disp: 18 tablet, Rfl: 0  •  roflumilast (DALIRESP) 500 mcg tablet, Take 500 mcg by mouth daily with lunch., Disp: , Rfl:   •  theophylline (THEODUR) 200 mg 12 hr tablet, Take 200 mg by mouth 2 (two) times a day.  , Disp: , Rfl:   •  tiotropium (SPIRIVA WITH HANDIHALER) 1 capsule = per inhalation capsule, Place 1 capsule into inhaler and inhale every morning., Disp: , Rfl:       ROS:  Constitutional: negative for fever  Eyes: negative for eye pain  ENT: negative for sore throat  Cardiovascular: negative for chest pain  Respiratory: negative for hemoptysis  GI: negative for abdominal pain  : negative for hematuria  Musculoskeletal: negative for back pain  Neuro: negative for headache  Hematology: negative for bleeding  Immunology: negative for joint pain      ED Triage Vitals   Temp Heart Rate Resp BP SpO2   07/24/18 0155  07/24/18 0158 07/24/18 0158 07/24/18 0207 07/24/18 0158   37 °C (98.6 °F) (!) 149 (!) 32 135/92 97 %      Temp Source Heart Rate Source Patient Position BP Location FiO2 (%)   07/24/18 0158 -- 07/24/18 0207 -- --   Oral  Head of bed 30 degrees or higher           Physical Exam:  Nursing note and vitals reviewed.  Constitutional: appears well-developed.   HENT:   Head: Normocephalic and atraumatic.   Eyes: Pupils are equal, round, and reactive to light.   Neck: Supple, no lymphadenopathy  Cardiovascular: Regular rate and rhythm with no murmur, rub, or gallop.  Normal pulses.  Pulmonary/Chest: Severe respiratory distress with decreased breath sounds bilaterally.  Abdominal: Soft and nontender.    Back: No CVA tenderness.  Musculoskeletal: No edema  Neurological: Alert.   Skin: Skin is warm and dry. No rash noted.   Psychiatric: Normal mood and affect.           Labs:  Labs Reviewed - No data to display      Imaging:  XR chest portable 1 view    (Results Pending)           MDM: Patient has respiratory failure.  She does not tolerate BiPAP.  She was given multiple DuoNeb's.  Case was discussed with the intensivist.  The patient was very closely monitored in the emergency department.    The high probability of sudden, clinically significant, or life-threatening deterioration required my full and direct attention, intervention, and personal management while the patient was critical.  I provided critical care services including those noted below.    30 minutes in addition to separately billable procedures.          Given   Medications   ipratropium-albuterol (DUO-NEB) 0.5-2.5 mg/3 mL nebulizer solution 3 mL (3 mL nebulization Given 7/24/18 0235)   ipratropium-albuterol (DUO-NEB) 0.5-2.5 mg/3 mL nebulizer solution 3 mL (3 mL nebulization Given 7/24/18 0235)       ED Course as of Jul 24 0257 Tue Jul 24, 2018   0244 Slight improvement with DuoNeb.  [KM]   0245 Patient will not tolerate BiPAP secondary to claustrophobia.   [KM]      ED Course User Index  [KM] Hua Carrasco MD         ECG 12 lead - Shortness of breath  Date/Time: 7/24/2018 2:42 AM  Performed by: HUA CARRASCO  Authorized by: HUA CARRASCO     Comments:      Sinus tachycardia rate of 148 nonspecific ST-T wave changes probable left atrial enlargement              Clinical Impression:  Final diagnoses:   [J44.1] COPD exacerbation (CMS/HCC)   Respiratory failure        A voice recognition program was used to aid in documentation of this record.  Sometimes words are not printed exactly as they were spoken.  While efforts were made to carefully edit and correct any inaccuracies, some errors may be present; please take these into context.  Please contact the provider if areas are identified.         Hua Carrasco MD  07/24/18 0257

## 2018-07-24 NOTE — H&P
FMR HISTORY AND PHYSICAL NOTE      Subjective     Chief Complaint   Patient presents with   • Shortness of Breath   • Rapid Heart Rate         HPI:  58-year-old female with a history of end-stage COPD transferred from [place], [place] by air with acute respiratory distress.  Patient is followed closely by Dr. Perkins as an outpatient.      Patient reports that she is very sensitive to allergens it is been suffering from sinus pain and pressure for the last 3 or 4 days due to cottonwood pollen and exacerbation of other seasonal allergies.  She reports postnasal drip and nonproductive cough subsequently.  She reports fevers and chills 2 days ago but none currently.  She reports chest pain that is not new to her and is related to her respirations.  She denies her chest pain radiates to her left arm/jaw or associated with nausea or diaphoresis currently.    Patient did receive DuoNeb's and 125 mg Solu-Medrol prior to transfer.  She was still significantly distressed at the time of evaluation.              Prior to Admission medications    Medication Sig Start Date End Date Taking? Authorizing Provider   albuterol 2.5 mg /3 mL nebulizer solution Take 3 mL (2.5 mg total) by nebulization every 6 (six) hours as needed for wheezing. 12/5/17   Rosas Jimenez,    alendronate (FOSAMAX) 70 mg tablet Take 70 mg by mouth every 7 (seven) days. THURSDAY 1/9/14   Conversion Provider   ALPRAZolam (XANAX) 0.25 mg tablet Take 0.25 mg by mouth nightly as needed for anxiety.    Historical Provider, MD   ascorbic acid, vitamin C, (VITAMIN C) 500 mg tablet Take 500 mg by mouth 2 (two) times a day.   3/14/12   Conversion Provider   budesonide (PULMICORT) 1 mg/2 mL nebulizer solution Take 2 mL by nebulization 2 (two) times a day.   3/16/15   Conversion Provider   diltiazem CD (CARDIZEM CD) 240 mg 24 hr capsule Take 1 capsule (240 mg total) by mouth daily.  Patient not taking: Reported on 7/20/2018 2/18/18   Cooper Francis MD    docusate sodium (COLACE) 100 mg capsule Take 100 mg by mouth 2 (two) times a day. Takes after lunch and at bedtime  3/14/12   Conversion Provider   ergocalciferol (VITAMIN D2) 50,000 unit capsule Take 50,000 Units by mouth once a week. THURSDAY    Historical Provider, MD   fexofenadine (ALLEGRA) 60 mg tablet Take 60 mg by mouth 3 (three) times a day.   11/3/14   Conversion Provider   fluticasone (FLONASE) 50 mcg/actuation nasal spray Administer 1 spray into each nostril 3 (three) times a day.   3/16/15   Conversion Provider   fluticasone-salmeterol (ADVAIR DISKUS) 500-50 mcg/dose diskus inhaler Inhale 2 (two) times a day.   5/16/11   Conversion Provider   guaiFENesin (MUCINEX) 600 mg 12 hr tablet Take 1 tablet (600 mg total) by mouth 2 (two) times a day. 2/17/18   Cooper Francis MD   metoprolol tartrate (LOPRESSOR) 25 mg tablet Take 1 tablet (25 mg total) by mouth 2 (two) times a day.  Patient not taking: Reported on 7/20/2018 2/17/18   Cooper Francis MD   montelukast (SINGULAIR) 10 mg tablet Take 10 mg by mouth nightly.   5/6/13   Conversion Provider   multivitamin (THERAGRAN) tablet tablet Take 1 tablet by mouth daily with lunch.   3/14/12   Conversion Provider   nystatin (MYCOSTATIN) 100,000 unit/mL suspension Take 4 mL (400,000 Units total) by mouth 4 (four) times a day. 2/17/18   Cooper Francis MD   omeprazole (PriLOSEC) 20 mg capsule Take 20 mg by mouth 2 (two) times a day.   1/9/14   Conversion Provider   predniSONE (DELTASONE) 10 mg tablet Take 10 mg by mouth daily as needed. Left over prednisone     Historical Provider, MD   predniSONE (DELTASONE) 10 mg tablet Take 4 tabs PO daily (40 mg) for 2 days. Then 3 tab PO daily (30 mg) for 2 days.  Then 2 tab PO daily (20 Mg) for 2 days.Then 1 tab PO daily  Patient taking differently: 20 mg daily. Take 4 tabs PO daily (40 mg) for 2 days. Then 3 tab PO daily (30 mg) for 2 days.  Then 2 tab PO daily (20 Mg) for 2 days.Then 1 tab PO daily  2/17/18   Cooper Francis MD    roflumilast (DALIRESP) 500 mcg tablet Take 500 mcg by mouth daily with lunch.    Historical Provider, MD   theophylline (THEODUR) 200 mg 12 hr tablet Take 200 mg by mouth 2 (two) times a day.      Historical Provider, MD   tiotropium (SPIRIVA WITH HANDIHALER) 1 capsule = per inhalation capsule Place 1 capsule into inhaler and inhale every morning.    Historical Provider, MD           Current Facility-Administered Medications:   •  amoxicillin-pot clavulanate (AUGMENTIN) 875-125 mg per tablet 1 tablet, 1 tablet, oral, 2x daily with meals, Marko Crandall MD  •  methylPREDNISolone sod suc(PF) (Solu-MEDROL) injection 40 mg, 40 mg, intravenous, q6h **FOLLOWED BY** [START ON 7/25/2018] predniSONE (DELTASONE) tablet 40 mg, 40 mg, oral, Daily **FOLLOWED BY** [START ON 7/28/2018] predniSONE (DELTASONE) tablet 20 mg, 20 mg, oral, Daily, Marko Crandall MD    Current Outpatient Prescriptions:   •  albuterol 2.5 mg /3 mL nebulizer solution, Take 3 mL (2.5 mg total) by nebulization every 6 (six) hours as needed for wheezing., Disp: 75 mL, Rfl: 1  •  alendronate (FOSAMAX) 70 mg tablet, Take 70 mg by mouth every 7 (seven) days. THURSDAY , Disp: , Rfl:   •  ALPRAZolam (XANAX) 0.25 mg tablet, Take 0.25 mg by mouth nightly as needed for anxiety., Disp: , Rfl:   •  ascorbic acid, vitamin C, (VITAMIN C) 500 mg tablet, Take 500 mg by mouth 2 (two) times a day.  , Disp: , Rfl:   •  budesonide (PULMICORT) 1 mg/2 mL nebulizer solution, Take 2 mL by nebulization 2 (two) times a day.  , Disp:  mL, Rfl:   •  diltiazem CD (CARDIZEM CD) 240 mg 24 hr capsule, Take 1 capsule (240 mg total) by mouth daily. (Patient not taking: Reported on 7/20/2018 ), Disp: 30 capsule, Rfl: 0  •  docusate sodium (COLACE) 100 mg capsule, Take 100 mg by mouth 2 (two) times a day. Takes after lunch and at bedtime , Disp: , Rfl:   •  ergocalciferol (VITAMIN D2) 50,000 unit capsule, Take 50,000 Units by mouth once a week. THURSDAY, Disp: , Rfl:   •  fexofenadine  (ALLEGRA) 60 mg tablet, Take 60 mg by mouth 3 (three) times a day.  , Disp: , Rfl:   •  fluticasone (FLONASE) 50 mcg/actuation nasal spray, Administer 1 spray into each nostril 3 (three) times a day.  , Disp: , Rfl:   •  fluticasone-salmeterol (ADVAIR DISKUS) 500-50 mcg/dose diskus inhaler, Inhale 2 (two) times a day.  , Disp: , Rfl:   •  guaiFENesin (MUCINEX) 600 mg 12 hr tablet, Take 1 tablet (600 mg total) by mouth 2 (two) times a day., Disp: 60 tablet, Rfl: 0  •  metoprolol tartrate (LOPRESSOR) 25 mg tablet, Take 1 tablet (25 mg total) by mouth 2 (two) times a day. (Patient not taking: Reported on 7/20/2018 ), Disp: 60 tablet, Rfl: 0  •  montelukast (SINGULAIR) 10 mg tablet, Take 10 mg by mouth nightly.  , Disp: , Rfl:   •  multivitamin (THERAGRAN) tablet tablet, Take 1 tablet by mouth daily with lunch.  , Disp: , Rfl:   •  nystatin (MYCOSTATIN) 100,000 unit/mL suspension, Take 4 mL (400,000 Units total) by mouth 4 (four) times a day., Disp: 300 mL, Rfl: 0  •  omeprazole (PriLOSEC) 20 mg capsule, Take 20 mg by mouth 2 (two) times a day.  , Disp: , Rfl:   •  predniSONE (DELTASONE) 10 mg tablet, Take 10 mg by mouth daily as needed. Left over prednisone , Disp: , Rfl:   •  predniSONE (DELTASONE) 10 mg tablet, Take 4 tabs PO daily (40 mg) for 2 days. Then 3 tab PO daily (30 mg) for 2 days. Then 2 tab PO daily (20 Mg) for 2 days.Then 1 tab PO daily (Patient taking differently: 20 mg daily. Take 4 tabs PO daily (40 mg) for 2 days. Then 3 tab PO daily (30 mg) for 2 days. Then 2 tab PO daily (20 Mg) for 2 days.Then 1 tab PO daily ), Disp: 18 tablet, Rfl: 0  •  roflumilast (DALIRESP) 500 mcg tablet, Take 500 mcg by mouth daily with lunch., Disp: , Rfl:   •  theophylline (THEODUR) 200 mg 12 hr tablet, Take 200 mg by mouth 2 (two) times a day.  , Disp: , Rfl:   •  tiotropium (SPIRIVA WITH HANDIHALER) 1 capsule = per inhalation capsule, Place 1 capsule into inhaler and inhale every morning., Disp: , Rfl:       Allergies    Allergen Reactions   • Diphenhydramine      DENIES   • Cefuroxime      SUNBURN   • Cefuroxime Axetil    • Diphenhydramine Hcl    • Erythromycin Lactobionate    • Methylphenidate      ON FIRE   • Metoclopramide      dizzy, sweating   • Propoxyphene    • Propoxyphene N-Acetaminophen    • Tramadol      PARALYSIS   • Codeine      oxygen gets really low   • Erythromycin Base      GI UPSET   • Ibuprofen      DENIES   • Latex      DRY MOUTH          Past Surgical History:   Procedure Laterality Date   • CERVICAL BIOPSY     •  SECTION      x4   • COLONOSCOPY  10/01/2016   • COLONOSCOPY     • COSMETIC SURGERY     • OTHER SURGICAL HISTORY      Cervical ablation for HPV   • OTHER SURGICAL HISTORY      Endotracheal tube placement   • OTHER SURGICAL HISTORY      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         Family History   Problem Relation Age of Onset   • Diabetes Mother    • Hypertension Father    • Cataracts Father    • Other Father         Benign prostatic hypertrophy witout outflow obstruction   • Heart disease Brother          Past Medical History:   Diagnosis Date   • Arthritis    • Asthma    • Cancer (CMS/HCC)     cervical CA   • COPD (chronic obstructive pulmonary disease) (CMS/HCC)    • History of transfusion    • Obesity (BMI 30.0-34.9) 2018   • Osteoporosis    • Pneumonia    • Wears dentures          Social History     Social History   • Marital status:      Spouse name: N/A   • Number of children: N/A   • Years of education: N/A     Occupational History   • Not on file.     Social History Main Topics   • Smoking status: Former Smoker     Packs/day: 1.00     Quit date:    • Smokeless tobacco: Former User   • Alcohol use No   • Drug use: No   • Sexual activity: Defer     Other Topics Concern   • Not on file      Social History Narrative   • No narrative on file          Review of Systems   Constitutional: Positive for chills, fatigue and fever.   HENT: Positive for postnasal drip, rhinorrhea, sinus pain, sinus pressure and sore throat. Negative for congestion.    Eyes: Negative for visual disturbance.   Respiratory: Positive for cough, chest tightness and shortness of breath.    Cardiovascular: Positive for chest pain and palpitations.   Gastrointestinal: Positive for nausea. Negative for abdominal pain.   Endocrine: Negative for cold intolerance and heat intolerance.   Genitourinary: Negative for dysuria.   Musculoskeletal: Negative for arthralgias.   Skin: Negative for rash.   Neurological: Positive for dizziness and weakness. Negative for headaches.   Psychiatric/Behavioral: Positive for sleep disturbance.         Objective       /77   Pulse 130   Temp 37 °C (98.6 °F) (Oral)   Resp (!) 27   SpO2 96%       Physical Exam   GEN: Obvious respiratory distress  NEURO/PSYCH: CN II-XII grossly normal, global sensation intact grossly, motor strength 5/5 globally. Appropriate mood/affect.  HEENT: NCAT, dry mucous membranes, trachea midline, no ant cerv LAD  CV: Tachycardic, regular rhythm, no flow murmur  PULM: Very little air movement.  No wheezes or crackles noted.  Using accessory muscles.  ABD: Soft, NT/ND, no peritoneal signs  Skin/EXT: No ext edema appreciated; cap refill 2 sec.       EKG: Sinus tachycardia.    No results found.    Active Problems:    Acute-on-chronic respiratory failure (CMS/HCC)    Chronic obstructive lung disease (CMS/HCC)    Systemic inflammatory response syndrome (CMS/HCC)    Obesity (BMI 30.0-34.9)          Assessment  58-year-old female admitted for COPD exacerbation      PLAN      #acute on chronic hypoxic/hypercapnic respiratory failure.  Patient using 5 L oxygen by nasal cannula, uses 4 L at home.  Reporting pleuritic chest pain consistent with prior exacerbations, I do not suspect  that this is cardiac in origin.  Per her recent outpatient pulmonology note, she is in the middle of a prolonged prednisone wean, currently at 10 mg daily.  She is a complicated regimen of oral and inhaled medications at home.  -Continue home inhalers and oral medications for allergies and COPD  -Hold prednisone.  Initiate Solu-Medrol to prednisone burst.  Will need a prolonged prednisone taper as she improves.  -Azithromycin 500 mg IV daily for inflammation and possible underlying infection  -Augmentin 875 twice daily for 7 days for sinus infection -suspect this set off her exacerbation  -Respiratory PCR, sputum culture, urine strep/Legionella antigens pending  -We will attempt BiPAP the patient has been claustrophobic with this in the past.  Xanax nightly and as needed available              D/W:  Dr. Park  PPX: Lovenox, Nexium  Fluids: 100 lactated Ringer's an hour  Nutrition: N.p.o. due to tachypnea and aspiration risk  Abx: Azithromycin  Consults: Likely pulmonology tomorrow  Code: Full    Marko Crandall MD  3:50 AM, 7/24/2018.

## 2018-07-24 NOTE — PLAN OF CARE
Problem: RESPIRATORY - ADULT  Goal: Achieves optimal ventilation and oxygenation  INTERVENTIONS:  1. Assess for changes in respiratory status  2. Assess for changes in mentation and behavior  3. Position to facilitate oxygenation and minimize respiratory effort  4. Oxygen supplementation based on oxygen saturation or ABGs  5. Assess patient's ability to cough effectively  6. Encourage broncho-pulmonary hygiene including cough, deep breathe, pursed-lip breathing and tripod positioning  7. Assess and instruct to report SOB or any respiratory difficulty  8. Administer as needed and scheduled meter dosed inhalers and nebulizer treatments  Outcome: Progressing   07/24/18 0853   Interventions Appropriate for this Patient   Achieves optimal ventilation and oxygenation Assess for changes in respiratory status;Assess for changes in mentation and behavior;Position to facilitate oxygenation and minimize respiratory effort;Oxygen supplementation based on oxygen saturation or arterial blood gases;Assess patient's ability to cough effectively;Encourage broncho-pulmonary hygiene including cough, deep breathe, pursed-lip breathing and tripod positioning;Assess and instruct to report shortness of breath or any respiratory difficulty;Administer as needed and scheduled meter dosed inhalers and nebulizer treatments

## 2018-07-24 NOTE — INTERDISCIPLINARY/THERAPY
07/24/18 0935   PT Last Visit   PT Received On 07/24/18   General   Chart Reviewed Yes   Therapy Treatment Diagnosis dx: acute bronchiolitis d/t respiratory syncytical virus, acute exacerbation of chronci bronchitis, acute respiratory failure, bronchitis, COPD, hypoxemia, kidney stone, lactic acidosis, obesity, UTI   Is this a Co-Treatment? Yes  (OT)   Family/Caregiver Present No   Home Living   Home Living Comments Pt lives at home with adult children. She utilizes W/C to push when ambulating. She reports indep with ADLs however she is only able to walk from chair to chair in home without AD.    Subjective Comments   Subjective Comments RN ok'd PT session. Pt was in semi balderas position in bed and ended session sitting in chair with call light in reach.    Bed Mobility   Bed Mobility Assessed   Bed Mobility 1   Bed Mobility Comments 1 Pt performed bed mobilty with Cyndee as HOB was raised to about 40 degrees.    Transfers   Transfer Assessed   Transfer 1   Trials/Comments 1 Pt performed sit<>stand from bed to chair with SBA/CGAX1. She pivoted to chair with CGA/SBAx1.    Other Activities   Other Activities Comments She performed standing marching and mini swuats with CGAx1.    Assessment   Assessment Comment Denita demonstrates fair mobility with poor activity tolerance which is likely her baseline. She will continue to benefit from skilled PT to progress with activity tolerance and nursing assistance to maintian current physical ability.    Plan   PT Frequency 3-5x/wk   Plan Comment PT: 3-5x/wk for endruance based activities, BLE exercises, functional txfers, gait training as able.     Date POC Due 07/31/18

## 2018-07-24 NOTE — INTERDISCIPLINARY/THERAPY
"   07/24/18 0936   OT Last Visit   OT Received On 07/24/18   General   Chart Reviewed Yes   Therapy Treatment Diagnosis Presents w/SOB, rapid HR and sinus pressure past 3-4 days;    OT Treatment Duration (Min) 14 Minutes   Is this a Co-Treatment? Yes   Additional Pertinent History pt on 4 L O2 at home;  end stage COPD   Family/Caregiver Present No   Home Living   Home Living Comments Pt lives w/grown children.  She says she is independent w/basic ADLs, but they take her \"all day\" to do. Pt reports bathing is difficult d/t the hot/cold so she mostly sponge bathes.   Pt has a ramp to enter home. Pushes w/c longer distances. Uses furniture for support/fxal mob when at home.   Subjective Comments   Subjective Comments RN consent to tx. Pt agreeable.   Cognition   Arousal/Alertness WFL   Bed Mobility   Bed Mobility Assessed   Bed Mobility 1   Bed Mobility From 1 Supine   Bed Mobility Type 1 To   Bed Mobility to 1 Short sit   Level of Assistance 1 Modified independent   Bed Mobility Comments 1 HOB elevated.   Transfers   Transfer Assessed   Transfer 1   Transfer From 1 Bed;Sit   Transfer Type 1 To   Transfer to 1 Stand;Chair with arms   Technique 1 Sit to stand;Stand to sit   Transfer Device 1 Transfer belt   Level of Assistance 1 Contact guard assist x 1   Trials/Comments 1 Pt transferred to chair w/transfer belt, 3 L of O2.   Ambulation   Ambulation Not Assessed   LE Dressing   Sock Level of Assistance Dependent   RUE Assessment   RUE Assessment WFL   LUE Assessment   LUE Assessment WFL   Assessment   Rehab Potential Good   Problem List Decreased ADL status;Decreased endurance;Decreased functional mobility   Recommendations for Therapy Continue skilled therapy   Assessment Comment Pt demos limited fxal endurance, difficulties w/ADLS.  Recommend skilled OT services to return pt to PLOF and reduce caregiver burden.    Plan   Plan Comment standing tolerance;  fxal mob;  LB dressing   Treatment Interventions ADL " retraining;Therapeutic activity;Therapeutic exercise;UE strengthening/ROM;Endurance training;Equipment evaluation/education   OT Frequency 3-5x/wk   Date POC Due 07/31/18  (POC due)

## 2018-07-24 NOTE — MEDICATION HISTORY SPECIALIST NOTES
CSN: [id number]  : [id number]    Information sources:  [facility]    Allergies verified.    Patient interviewed in [place]. Patient was a poor historian. Updated Epic per recent fills at [facility], with unknown last doses.    New medications added: Nystatin Cream, Ranitidine, Tums, Cetirizine, Hydrocortisone Cream, Ibuprofen, Metformin, Menthol Cream, Simethicone    Discontinued medications: Allegra, Prednisone    Dose changes: Budesonide,     Discrepancies: Alprazolam, Diltiazem, Metoprolol Tartrate.    See  for medication resources.

## 2018-07-24 NOTE — PROGRESS NOTES
Critical Care Daily Progress Note    Subjective      No longer requiring BiPAP- home oxygen requirement currently, notes feels at baseline respiratory status; no other signs or symptoms concerning her at this time     Objective       Vital signs:  Vitals:    07/24/18 0415 07/24/18 0500 07/24/18 0509 07/24/18 0600   BP: 116/79 104/73  112/72   BP Location: Right arm Right arm  Right arm   Patient Position: Head of bed 30 degrees or higher Head of bed 30 degrees or higher  Head of bed 30 degrees or higher   Pulse: 115 118 120 116   Resp: (!) 25 (!) 25 (!) 25 (!) 30   Temp: 36.5 °C (97.7 °F)      TempSrc: Oral      SpO2: 97% 95% 93% 94%       Physical Exam:    General: awake, alert, appropriate  Cv:  ST  Pulm:  tachypneic but no sig resp distress, poor air movement bilaterally - no rales, rhonchi  Abdominal:  Soft, NTND  Extremities:  Min edema, warm  Neuro:  Moves all 4 speech clear, grossly nonfocal    Assessment/Plan        LOS: 0 days     Denita Spring is a 58 y.o. female admitted with acute on chronic hypoxemic respiratory failure related to an exacerbation of her severe COPD. Hx of severe COPD-oxygen dependent complicated by chronic steroid use.    Analagosedation: No current requirements  Anxiety: Xanax as needed    Acute on chronic hypoxemic respiratory failure/End stage O2 dependent emphysematous COPD c/b acute exacerbation, chronic steroid use and CO2 retention: Continue scheduled nebs/home inhalers as appropriate, no longer requiring BiPAP thus will discontinue order, continue azithromycin as may have some beneficial effect from an inflammatory standpoint for patient chronically, continue steroids and will need a prolonged wean given chronic need; may need PJP prophylaxis if continues to require high doses for prolonged period of time, will continue Augmentin for potential sinusitis and contributed to the episode but was not having sputum production or increased cough thus unlikely has community acquired  pneumonia, continue home theophylline and roflumilast, hold to troponin while getting scheduled ipratropium    Hypertension: Continue metoprolol, diltiazem  Fluids: restrictive fluid strategy  Electrolytes: No clin sig issues- monitor   Nutrition: NPO- adv if stay of BiPAP    Glycemic control: per ICU protocol, goal<180    Early Mobility/Rehabilitation: PT/OT consulted- early mobility protocol, appreciate Assistance.    VTE Prophylaxis: lovenox  Stress Ulcer Prophylaxis: home mediation: PPI    Urinary Catheter: n/a    Lines/Tubes:  PIV    Code Status/Goals of Care: Full Code    Disposition/Discharge Plan: TICU, potentially to floor later today if trajectory stays the same     Greater than 35 minutes spent in the evaluation and management of patient's current medical condition    Luis Crispin  Critical Care Medicine

## 2018-07-24 NOTE — Clinical Note
Level of Care: Intensive Care Transitional [23]   Admitting Provider: JC PATTERSON [[id number]]   Special bed requests:: Telemetry

## 2018-07-25 LAB
L PNEUMO AG UR QL: NEGATIVE
S PNEUM AG SPEC QL: NEGATIVE

## 2018-07-25 PROCEDURE — 6370000100 HC RX 637 (ALT 250 FOR IP): Performed by: INTERNAL MEDICINE

## 2018-07-25 PROCEDURE — 6360000200 HC RX 636 W HCPCS (ALT 250 FOR IP): Performed by: STUDENT IN AN ORGANIZED HEALTH CARE EDUCATION/TRAINING PROGRAM

## 2018-07-25 PROCEDURE — 87449 NOS EACH ORGANISM AG IA: CPT | Performed by: STUDENT IN AN ORGANIZED HEALTH CARE EDUCATION/TRAINING PROGRAM

## 2018-07-25 PROCEDURE — 97530 THERAPEUTIC ACTIVITIES: CPT | Mod: GO

## 2018-07-25 PROCEDURE — (BLANK) HC ROOM PRIVATE

## 2018-07-25 PROCEDURE — 97530 THERAPEUTIC ACTIVITIES: CPT | Mod: GP | Performed by: PHYSICAL THERAPIST

## 2018-07-25 PROCEDURE — 94640 AIRWAY INHALATION TREATMENT: CPT

## 2018-07-25 PROCEDURE — 99232 SBSQ HOSP IP/OBS MODERATE 35: CPT | Performed by: INTERNAL MEDICINE

## 2018-07-25 PROCEDURE — 6370000100 HC RX 637 (ALT 250 FOR IP): Performed by: NURSE PRACTITIONER

## 2018-07-25 PROCEDURE — 2580000300 HC RX 258: Performed by: STUDENT IN AN ORGANIZED HEALTH CARE EDUCATION/TRAINING PROGRAM

## 2018-07-25 PROCEDURE — 6370000100 HC RX 637 (ALT 250 FOR IP): Performed by: STUDENT IN AN ORGANIZED HEALTH CARE EDUCATION/TRAINING PROGRAM

## 2018-07-25 RX ORDER — ACETAMINOPHEN 325 MG/1
650 TABLET ORAL ONCE
Status: COMPLETED | OUTPATIENT
Start: 2018-07-25 | End: 2018-07-25

## 2018-07-25 RX ORDER — METOPROLOL TARTRATE 25 MG/1
12.5 TABLET, FILM COATED ORAL ONCE
Status: COMPLETED | OUTPATIENT
Start: 2018-07-25 | End: 2018-07-25

## 2018-07-25 RX ORDER — LEVALBUTEROL 1.25 MG/.5ML
1.25 SOLUTION, CONCENTRATE RESPIRATORY (INHALATION) EVERY 4 HOURS PRN
Status: DISCONTINUED | OUTPATIENT
Start: 2018-07-25 | End: 2018-07-28 | Stop reason: HOSPADM

## 2018-07-25 RX ADMIN — ROFLUMILAST 500 MCG: 500 TABLET ORAL at 11:46

## 2018-07-25 RX ADMIN — OXYCODONE HYDROCHLORIDE AND ACETAMINOPHEN 500 MG: 500 TABLET ORAL at 09:16

## 2018-07-25 RX ADMIN — MONTELUKAST SODIUM 10 MG: 10 TABLET, FILM COATED ORAL at 20:59

## 2018-07-25 RX ADMIN — FLUTICASONE PROPIONATE 1 SPRAY: 50 SPRAY, METERED NASAL at 09:21

## 2018-07-25 RX ADMIN — AMOXICILLIN AND CLAVULANATE POTASSIUM 1 TABLET: 875; 125 TABLET, FILM COATED ORAL at 17:57

## 2018-07-25 RX ADMIN — ACETAMINOPHEN 650 MG: 325 TABLET ORAL at 21:24

## 2018-07-25 RX ADMIN — AZITHROMYCIN MONOHYDRATE 500 MG: 500 INJECTION, POWDER, LYOPHILIZED, FOR SOLUTION INTRAVENOUS at 05:00

## 2018-07-25 RX ADMIN — IPRATROPIUM BROMIDE 0.5 MG: 0.5 SOLUTION RESPIRATORY (INHALATION) at 13:52

## 2018-07-25 RX ADMIN — FLUTICASONE PROPIONATE 1 SPRAY: 50 SPRAY, METERED NASAL at 20:59

## 2018-07-25 RX ADMIN — HYDROCORTISONE 1 APPLICATION: 1 CREAM TOPICAL at 20:59

## 2018-07-25 RX ADMIN — THEOPHYLLINE ANHYDROUS 400 MG: 200 CAPSULE, EXTENDED RELEASE ORAL at 09:18

## 2018-07-25 RX ADMIN — BUDESONIDE 1 MG: 1 SUSPENSION RESPIRATORY (INHALATION) at 07:27

## 2018-07-25 RX ADMIN — HYDROCORTISONE 1 APPLICATION: 1 CREAM TOPICAL at 09:18

## 2018-07-25 RX ADMIN — ACETAMINOPHEN 650 MG: 325 TABLET ORAL at 10:06

## 2018-07-25 RX ADMIN — IPRATROPIUM BROMIDE 0.5 MG: 0.5 SOLUTION RESPIRATORY (INHALATION) at 07:26

## 2018-07-25 RX ADMIN — LORATADINE 10 MG: 10 TABLET ORAL at 09:17

## 2018-07-25 RX ADMIN — LEVALBUTEROL 1.25 MG: 1.25 SOLUTION, CONCENTRATE RESPIRATORY (INHALATION) at 18:08

## 2018-07-25 RX ADMIN — LEVALBUTEROL 1.25 MG: 1.25 SOLUTION, CONCENTRATE RESPIRATORY (INHALATION) at 07:27

## 2018-07-25 RX ADMIN — PREDNISONE 40 MG: 20 TABLET ORAL at 09:17

## 2018-07-25 RX ADMIN — AMOXICILLIN AND CLAVULANATE POTASSIUM 1 TABLET: 875; 125 TABLET, FILM COATED ORAL at 09:16

## 2018-07-25 RX ADMIN — IPRATROPIUM BROMIDE 0.5 MG: 0.5 SOLUTION RESPIRATORY (INHALATION) at 18:08

## 2018-07-25 RX ADMIN — METOPROLOL TARTRATE 12.5 MG: 25 TABLET ORAL at 10:07

## 2018-07-25 RX ADMIN — OXYCODONE HYDROCHLORIDE AND ACETAMINOPHEN 500 MG: 500 TABLET ORAL at 20:59

## 2018-07-25 RX ADMIN — LEVALBUTEROL 1.25 MG: 1.25 SOLUTION, CONCENTRATE RESPIRATORY (INHALATION) at 13:52

## 2018-07-25 RX ADMIN — BUDESONIDE 1 MG: 1 SUSPENSION RESPIRATORY (INHALATION) at 18:09

## 2018-07-25 RX ADMIN — ESOMEPRAZOLE MAGNESIUM 40 MG: 40 CAPSULE, DELAYED RELEASE ORAL at 15:47

## 2018-07-25 RX ADMIN — ENOXAPARIN SODIUM 40 MG: 100 INJECTION SUBCUTANEOUS at 15:47

## 2018-07-25 NOTE — PROGRESS NOTES
Internal Medicine Daily Progress Note   LOS: 1 day     Subjective      Patient states her breathing is better.         Objective       Vital signs:  Temp:  [36.5 °C (97.7 °F)-36.7 °C (98.1 °F)] 36.7 °C (98.1 °F)  Heart Rate:  [] 101  Resp:  [20-26] 20  BP: ()/(56-63) 91/58      Physical Exam:    Patient consented to physical exam.  General: Resting comfortably in bed.  No acute distress.  Heart: Regular rate.  No audible murmurs.  Lungs:End expiratory wheezing present bilaterally.  Abdomen: Soft, nontender, bowel sounds are present.  Extremities: Normal.  Atraumatic.  Neuro: Patient is alert and oriented ×3.  Assessment/Plan       Principal Problem:    COPD with acute exacerbation (CMS/HCC)  Active Problems:    Acute-on-chronic respiratory failure (CMS/HCC)    Chronic obstructive lung disease (CMS/HCC)    Systemic inflammatory response syndrome (CMS/HCC)    Obesity (BMI 30.0-34.9)    1.  COPD exacerbation.  7/25: Continue on current management with azithromycin and steroids.  Continue fluticasone and Atrovent and Xopenex.  Have consulted pulmonary.  Will follow their guidelines.  Respiratory panel negative. Streptococcus and Legionella antibodies negative.  Chest x-ray with no active disease.  However, patient does have some sinus tenderness, thus will continue Augmentin for this time.  Have reviewed Dr. Perkins's outpatient notes.  Prognosis extremely poor given patient's significant COPD.  No dated 6/11/2018 indicates that lung transplant was not recommended for the patient.  Did not pursue further workup based off of this.    A voice recognition program was used to aid in documentation of this record.  Sometimes words are not printed exactly as they were spoken.  While efforts were made to carefully edit and correct any inaccuracies, some errors may be present; please take these into context.  Please contact the provider if errors are identified.      Code Status: Full Code

## 2018-07-25 NOTE — PLAN OF CARE
Problem: RESPIRATORY - ADULT  Goal: Achieves optimal ventilation and oxygenation  INTERVENTIONS:  1. Assess for changes in respiratory status  2. Assess for changes in mentation and behavior  3. Position to facilitate oxygenation and minimize respiratory effort  4. Oxygen supplementation based on oxygen saturation or ABGs  5. Assess patient's ability to cough effectively  6. Encourage broncho-pulmonary hygiene including cough, deep breathe, pursed-lip breathing and tripod positioning  7. Assess and instruct to report SOB or any respiratory difficulty  8. Administer as needed and scheduled meter dosed inhalers and nebulizer treatments   Outcome: Progressing   07/25/18 0049   Interventions Appropriate for this Patient   Achieves optimal ventilation and oxygenation Assess for changes in respiratory status;Assess for changes in mentation and behavior;Position to facilitate oxygenation and minimize respiratory effort;Oxygen supplementation based on oxygen saturation or arterial blood gases;Assess patient's ability to cough effectively;Encourage broncho-pulmonary hygiene including cough, deep breathe, pursed-lip breathing and tripod positioning;Assess and instruct to report shortness of breath or any respiratory difficulty;Administer as needed and scheduled meter dosed inhalers and nebulizer treatments

## 2018-07-25 NOTE — INTERDISCIPLINARY/THERAPY
07/25/18 1358   PT Last Visit   PT Received On 07/25/18   General   Chart Reviewed Yes   PT Treatment Duration (Min) 8 Minutes   Is this a Co-Treatment? Yes  (OT)   Family/Caregiver Present No   Precautions   Reinforced Precautions Yes   Other Precautions tolerates very minimal activity   Subjective Comments   Subjective Comments RN okayed therapy. Pt in bed, agreeable to PT/OT. Pt expresses she is at baseline mobility and has 24/7 assist at home. Pt in recliner end of tx, call light in reach, all needs met.   Pain Assessment   Pain Assessment (Lung and sinus pain - does not rate)   Cognition   Cognition Comment Intact   Bed Mobility   Bed Mobility Assessed   Bed Mobility 1   Bed Mobility From 1 Long sit   Bed Mobility Type 1 To   Bed Mobility to 1 Short sit   Level of Assistance 1 Modified independent   Bed Mobility Comments 1 SOB with this minimal activity   Transfers   Transfer Assessed   Transfer 1   Transfer From 1 Bed;Sit   Transfer Type 1 To and from   Transfer to 1 Stand;Chair with arms   Technique 1 Sit to stand;Stand to sit   Transfer Device 1 Transfer belt   Level of Assistance 1 Contact guard assist x 1   Ambulation   Ambulation Assessed   Ambulation 1   Surface 1 Level surface;Smooth   Device 1 No device;Gait belt   Assistance 1 Contact guard assist x 1   Quality of Gait 1 Steady   Comments/Distance 1 1m bed to chair - pt expresses she does not ambulate much more than this at home.   Balance   Balance Intact   LE Dressing   LE Dressing Comments (I) to don socks   Activity Tolerance   Position Standing;Sitting   Standing Endurance Tolerates less than 10 min exercise, no significant change in vital signs   Activity Tolerance Comments Pt becomes SOB with very minimal activity and with conversation   Assessment   Progress Progressing toward goals   Problem List Decreased endurance   Barriers to Discharge None   Assessment Comment Pt completes all mobility with SBA-CGA. She is overall steady with mobility  but very limited by activity tolerance. Pt reports she is at baseline for endurance and agreeable to DC PT. No further need for skilled PT at this time.   Recommendation   Recommendations for Therapy No further therapy needed   Equipment Recommended None   Plan   Plan Comment DC PT

## 2018-07-25 NOTE — INTERDISCIPLINARY/THERAPY
07/25/18 1359   OT Last Visit   OT Received On 07/25/18   General   Chart Reviewed Yes   OT Treatment Duration (Min) 8 Minutes   Is this a Co-Treatment? Yes   Family/Caregiver Present No   Subjective Comments   Subjective Comments Recieved RN's consent and pt is agreeable to participate in OT/PT cotx session due to poor activity tolerance. pt voicing she is at baseline with all functional mobility and will have 24'7 assistance for all ADL/IADL cares upon d/c. plan to d/c pt from OT services.   (Completed in collaboration with OTR.)   Pain Assessment   Pain Assessment (Voicing lung and nasal pain)   Cognition   Arousal/Alertness WFL   Cognition Comment Appropriate to task.    Bed Mobility   Bed Mobility Assessed   Bed Mobility 1   Bed Mobility From 1 Long sit   Bed Mobility Type 1 To   Bed Mobility to 1 Short sit   Level of Assistance 1 Modified independent   Transfers   Transfer Assessed   Transfer 1   Transfer From 1 Bed;Sit   Transfer Type 1 To   Transfer to 1 Sit;Chair with arms   Technique 1 Stand pivot   Transfer Device 1 Transfer belt   Level of Assistance 1 Contact guard assist x 1   Trials/Comments 1 pt demo'd well no concerns   Ambulation   Ambulation Not Assessed   Balance   Balance Intact   LE Dressing   LE Dressing Yes   Sock Level of Assistance Independent   LE Dressing Where Assessed Edge of bed   LE Dressing Comments 2/2 steps    Assessment   Rehab Potential Good   Progress Discontinue OT   Recommendations for Therapy No further therapy needed   Assessment Comment pt demo'd well during functional tasks however continues to be limited due to fatigue. pt verbalizing this is baseline and will have assistance at home when she is able to d/c.    Plan   Plan Comment D/c OT services

## 2018-07-25 NOTE — INTERDISCIPLINARY/THERAPY
Care Management [phone]    Chart reviewed, plan of care discussed in MDR. Patient reports that she lives in [place] with family. She states that daughter Chandrika or son Sher can transport her home upon discharge. Patient has been discharged from PT. No discharge needs at this time. CM will continue to follow.

## 2018-07-25 NOTE — PLAN OF CARE
Problem: RESPIRATORY - ADULT  Goal: Achieves optimal ventilation and oxygenation  INTERVENTIONS:  1. Assess for changes in respiratory status  2. Assess for changes in mentation and behavior  3. Position to facilitate oxygenation and minimize respiratory effort  4. Oxygen supplementation based on oxygen saturation or ABGs  5. Assess patient's ability to cough effectively  6. Encourage broncho-pulmonary hygiene including cough, deep breathe, pursed-lip breathing and tripod positioning  7. Assess and instruct to report SOB or any respiratory difficulty  8. Administer as needed and scheduled meter dosed inhalers and nebulizer treatments   Outcome: Progressing   07/25/18 1131   Interventions Appropriate for this Patient   Achieves optimal ventilation and oxygenation Position to facilitate oxygenation and minimize respiratory effort;Assess for changes in mentation and behavior;Oxygen supplementation based on oxygen saturation or arterial blood gases

## 2018-07-26 LAB
ANION GAP SERPL CALC-SCNC: 11 MMOL/L (ref 3–11)
ANISOCYTOSIS PRESENCE IN BLOOD, ANALYZER: ABNORMAL
BACTERIA ISLT CULT: NORMAL
BASOPHILS # BLD AUTO: 0 10*3/UL
BASOPHILS NFR BLD AUTO: 0 % (ref 0–2)
BUN SERPL-MCNC: 22 MG/DL (ref 7–25)
CALCIUM SERPL-MCNC: 9.2 MG/DL (ref 8.6–10.3)
CHLORIDE SERPL-SCNC: 105 MMOL/L (ref 98–107)
CO2 SERPL-SCNC: 28 MMOL/L (ref 21–32)
CREAT SERPL-MCNC: 0.7 MG/DL (ref 0.6–1.2)
EOSINOPHIL # BLD AUTO: 0 10*3/UL
EOSINOPHIL NFR BLD AUTO: 0 % (ref 0–3)
ERYTHROCYTE [DISTWIDTH] IN BLOOD BY AUTOMATED COUNT: 18.8 % (ref 11.5–14)
GFR SERPL CREATININE-BSD FRML MDRD: 87 ML/MIN/1.73M*2
GLUCOSE SERPL-MCNC: 90 MG/DL (ref 70–105)
HCT VFR BLD AUTO: 35.1 % (ref 34–45)
HGB BLD-MCNC: 11.3 G/DL (ref 11.5–15.5)
HYPOCHROMIA PRESENCE IN BLOOD, ANALYZER: ABNORMAL
LYMPHOCYTES # BLD AUTO: 2.3 10*3/UL
LYMPHOCYTES NFR BLD AUTO: 21 % (ref 11–47)
MCH RBC QN AUTO: 25.7 PG (ref 28–33)
MCHC RBC AUTO-ENTMCNC: 32.2 G/DL (ref 32–36)
MCV RBC AUTO: 79.6 FL (ref 81–97)
MICROCYTOSIS PRESENCE IN BLOOD, ANALYZER: ABNORMAL
MONOCYTES # BLD AUTO: 0.9 10*3/UL
MONOCYTES NFR BLD AUTO: 8 % (ref 3–11)
NEUTROPHILS # BLD AUTO: 7.8 10*3/UL
NEUTROPHILS NFR BLD AUTO: 71 % (ref 41–81)
PLATELET # BLD AUTO: 289 10*3/UL (ref 140–350)
PMV BLD AUTO: 7.1 FL (ref 6.9–10.8)
POTASSIUM SERPL-SCNC: 3.5 MMOL/L (ref 3.5–5.1)
RBC # BLD AUTO: 4.41 10*6/ΜL (ref 3.7–5.3)
SODIUM SERPL-SCNC: 144 MMOL/L (ref 135–145)
THEOPHYLLINE SERPL-MCNC: 7.9 UG/ML (ref 10–20)
TSH SERPL DL<=0.05 MIU/L-ACNC: 1.49 UIU/ML (ref 0.34–4.82)
WBC # BLD AUTO: 11 10*3/UL (ref 4.5–10.5)

## 2018-07-26 PROCEDURE — (BLANK) HC ROOM PRIVATE

## 2018-07-26 PROCEDURE — 80048 BASIC METABOLIC PNL TOTAL CA: CPT | Performed by: FAMILY MEDICINE

## 2018-07-26 PROCEDURE — 84443 ASSAY THYROID STIM HORMONE: CPT | Performed by: FAMILY MEDICINE

## 2018-07-26 PROCEDURE — 6370000100 HC RX 637 (ALT 250 FOR IP): Performed by: INTERNAL MEDICINE

## 2018-07-26 PROCEDURE — 6360000200 HC RX 636 W HCPCS (ALT 250 FOR IP): Performed by: STUDENT IN AN ORGANIZED HEALTH CARE EDUCATION/TRAINING PROGRAM

## 2018-07-26 PROCEDURE — 6370000100 HC RX 637 (ALT 250 FOR IP): Performed by: FAMILY MEDICINE

## 2018-07-26 PROCEDURE — 99233 SBSQ HOSP IP/OBS HIGH 50: CPT | Performed by: FAMILY MEDICINE

## 2018-07-26 PROCEDURE — 99232 SBSQ HOSP IP/OBS MODERATE 35: CPT | Performed by: INTERNAL MEDICINE

## 2018-07-26 PROCEDURE — 6370000100 HC RX 637 (ALT 250 FOR IP): Performed by: STUDENT IN AN ORGANIZED HEALTH CARE EDUCATION/TRAINING PROGRAM

## 2018-07-26 PROCEDURE — 2580000300 HC RX 258: Performed by: STUDENT IN AN ORGANIZED HEALTH CARE EDUCATION/TRAINING PROGRAM

## 2018-07-26 PROCEDURE — 80198 ASSAY OF THEOPHYLLINE: CPT | Performed by: FAMILY MEDICINE

## 2018-07-26 PROCEDURE — 85025 COMPLETE CBC W/AUTO DIFF WBC: CPT | Performed by: FAMILY MEDICINE

## 2018-07-26 PROCEDURE — 6360000200 HC RX 636 W HCPCS (ALT 250 FOR IP): Performed by: INTERNAL MEDICINE

## 2018-07-26 PROCEDURE — 36415 COLL VENOUS BLD VENIPUNCTURE: CPT | Performed by: FAMILY MEDICINE

## 2018-07-26 PROCEDURE — 94640 AIRWAY INHALATION TREATMENT: CPT

## 2018-07-26 RX ORDER — IPRATROPIUM BROMIDE 0.5 MG/2.5ML
0.5 SOLUTION RESPIRATORY (INHALATION)
Status: DISCONTINUED | OUTPATIENT
Start: 2018-07-26 | End: 2018-07-28 | Stop reason: HOSPADM

## 2018-07-26 RX ORDER — TIOTROPIUM BROMIDE 18 UG/1
1 CAPSULE ORAL; RESPIRATORY (INHALATION)
Status: DISCONTINUED | OUTPATIENT
Start: 2018-07-26 | End: 2018-07-26

## 2018-07-26 RX ORDER — ACETAMINOPHEN 325 MG/1
650 TABLET ORAL EVERY 4 HOURS PRN
Status: DISCONTINUED | OUTPATIENT
Start: 2018-07-26 | End: 2018-07-28 | Stop reason: HOSPADM

## 2018-07-26 RX ORDER — GUAIFENESIN 600 MG/1
600 TABLET, EXTENDED RELEASE ORAL 2 TIMES DAILY
Status: DISCONTINUED | OUTPATIENT
Start: 2018-07-26 | End: 2018-07-28 | Stop reason: HOSPADM

## 2018-07-26 RX ORDER — ACETAMINOPHEN 325 MG/1
650 TABLET ORAL EVERY 6 HOURS PRN
Status: DISCONTINUED | OUTPATIENT
Start: 2018-07-26 | End: 2018-07-26

## 2018-07-26 RX ADMIN — METOPROLOL TARTRATE 25 MG: 25 TABLET ORAL at 08:47

## 2018-07-26 RX ADMIN — LEVALBUTEROL 1.25 MG: 1.25 SOLUTION, CONCENTRATE RESPIRATORY (INHALATION) at 13:02

## 2018-07-26 RX ADMIN — FLUTICASONE PROPIONATE 1 SPRAY: 50 SPRAY, METERED NASAL at 21:14

## 2018-07-26 RX ADMIN — AZITHROMYCIN MONOHYDRATE 500 MG: 500 INJECTION, POWDER, LYOPHILIZED, FOR SOLUTION INTRAVENOUS at 05:00

## 2018-07-26 RX ADMIN — METOPROLOL TARTRATE 25 MG: 25 TABLET ORAL at 21:10

## 2018-07-26 RX ADMIN — MONTELUKAST SODIUM 10 MG: 10 TABLET, FILM COATED ORAL at 21:10

## 2018-07-26 RX ADMIN — OXYCODONE HYDROCHLORIDE AND ACETAMINOPHEN 500 MG: 500 TABLET ORAL at 08:40

## 2018-07-26 RX ADMIN — ACETAMINOPHEN 650 MG: 325 TABLET ORAL at 21:11

## 2018-07-26 RX ADMIN — OXYCODONE HYDROCHLORIDE AND ACETAMINOPHEN 500 MG: 500 TABLET ORAL at 21:10

## 2018-07-26 RX ADMIN — ACETAMINOPHEN 650 MG: 325 TABLET ORAL at 10:59

## 2018-07-26 RX ADMIN — GUAIFENESIN 600 MG: 600 TABLET, EXTENDED RELEASE ORAL at 10:59

## 2018-07-26 RX ADMIN — HYDROCORTISONE 1 APPLICATION: 1 CREAM TOPICAL at 21:15

## 2018-07-26 RX ADMIN — ENOXAPARIN SODIUM 40 MG: 100 INJECTION SUBCUTANEOUS at 14:16

## 2018-07-26 RX ADMIN — LEVALBUTEROL 1.25 MG: 1.25 SOLUTION, CONCENTRATE RESPIRATORY (INHALATION) at 05:20

## 2018-07-26 RX ADMIN — IPRATROPIUM BROMIDE 0.5 MG: 0.5 SOLUTION RESPIRATORY (INHALATION) at 18:59

## 2018-07-26 RX ADMIN — HYDROCORTISONE 1 APPLICATION: 1 CREAM TOPICAL at 08:42

## 2018-07-26 RX ADMIN — LORATADINE 10 MG: 10 TABLET ORAL at 08:40

## 2018-07-26 RX ADMIN — ROFLUMILAST 500 MCG: 500 TABLET ORAL at 12:24

## 2018-07-26 RX ADMIN — LEVALBUTEROL 1.25 MG: 1.25 SOLUTION, CONCENTRATE RESPIRATORY (INHALATION) at 18:59

## 2018-07-26 RX ADMIN — GUAIFENESIN 600 MG: 600 TABLET, EXTENDED RELEASE ORAL at 21:10

## 2018-07-26 RX ADMIN — FLUTICASONE PROPIONATE 1 SPRAY: 50 SPRAY, METERED NASAL at 08:40

## 2018-07-26 RX ADMIN — AMOXICILLIN AND CLAVULANATE POTASSIUM 1 TABLET: 875; 125 TABLET, FILM COATED ORAL at 17:38

## 2018-07-26 RX ADMIN — AMOXICILLIN AND CLAVULANATE POTASSIUM 1 TABLET: 875; 125 TABLET, FILM COATED ORAL at 08:40

## 2018-07-26 RX ADMIN — ESOMEPRAZOLE MAGNESIUM 40 MG: 40 CAPSULE, DELAYED RELEASE ORAL at 17:38

## 2018-07-26 RX ADMIN — IPRATROPIUM BROMIDE 0.5 MG: 0.5 SOLUTION RESPIRATORY (INHALATION) at 05:20

## 2018-07-26 RX ADMIN — BUDESONIDE 1 MG: 1 SUSPENSION RESPIRATORY (INHALATION) at 05:20

## 2018-07-26 RX ADMIN — THEOPHYLLINE ANHYDROUS 400 MG: 200 CAPSULE, EXTENDED RELEASE ORAL at 08:40

## 2018-07-26 RX ADMIN — PREDNISONE 40 MG: 20 TABLET ORAL at 08:39

## 2018-07-26 NOTE — PROGRESS NOTES
INTERNAL/FAMILY MEDICINE DAILY PROGRESS NOTE   LOS: 2 days     Subjective          Reviewed chart, discussed with nursing. Patient seen in face to face encounter.   Patient feeling better today, denies any new symptoms including chest pain and shortness of breath. Pain is controlled. Discussed patient's diagnoses, diagnostic results, and plan of care. All questions of the patient and family were answered to their satisfaction.       Current Facility-Administered Medications:   •  acetaminophen (TYLENOL) tablet 650 mg, 650 mg, oral, q4h PRN, Margaux Dias MD, 650 mg at 07/26/18 1059  •  ALPRAZolam (XANAX) tablet 0.25 mg, 0.25 mg, oral, Nightly PRN, Marko Crandall MD  •  amoxicillin-pot clavulanate (AUGMENTIN) 875-125 mg per tablet 1 tablet, 1 tablet, oral, 2x daily with meals, Marko Crandall MD, 1 tablet at 07/26/18 0840  •  artificial tears ophthalmic drops 1-2 drop, 1-2 drop, Both Eyes, PRN, Marko Crandall MD  •  ascorbic acid (vitamin C) (VITAMIN C) tablet 500 mg, 500 mg, oral, 2x daily, Marko Crandall MD, 500 mg at 07/26/18 0840  •  azithromycin (ZITHROMAX) 500 mg in normal saline 250 mL IVPB - VM, 500 mg, intravenous, q24h, Marko Crandall MD, Stopped at 07/26/18 0600  •  docusate sodium (COLACE) capsule 100 mg, 100 mg, oral, 2x daily PRN, Marko Crandall MD  •  enoxaparin (LOVENOX) syringe 40 mg, 40 mg, subcutaneous, Daily at 1400, Marko Crandall MD, 40 mg at 07/25/18 1547  •  esomeprazole (NexIUM) capsule 40 mg, 40 mg, oral, Daily at 1600, Fredy Roa MD, 40 mg at 07/25/18 1547  •  fentaNYL citrate (PF) 50 mcg/mL injection 25 mcg, 25 mcg, intravenous, q1h PRN, Marko Crandall MD  •  fentaNYL citrate (PF) 50 mcg/mL injection 50 mcg, 50 mcg, intravenous, q1h PRN, Marko Crandall MD  •  fluorescein ophthalmic strip 1 application, 1 application, Both Eyes, Once PRN, Marko Crandall MD  •  fluticasone (FLONASE) 50 mcg/actuation nasal spray 1 spray, 1 spray, Each Nostril, 2x daily, Fredy Roa MD, 1  spray at 07/26/18 0840  •  guaiFENesin (MUCINEX) 12 hr tablet 600 mg, 600 mg, oral, 2x daily, Margaux Dias MD, 600 mg at 07/26/18 1059  •  hydrocortisone 1 % cream, , Topical, 2x daily, Esther Gomez CNP, 1 application at 07/26/18 0842  •  levalbuterol (XOPENEX) 1.25 mg/0.5 mL nebulizer solution 1.25 mg, 1.25 mg, nebulization, 3x daily, Marko Crandall MD, 1.25 mg at 07/26/18 0520  •  levalbuterol (XOPENEX) 1.25 mg/0.5 mL nebulizer solution 1.25 mg, 1.25 mg, nebulization, q4h PRN, Bayron Ragsdale MD  •  loratadine (CLARITIN) tablet 10 mg, 10 mg, oral, Daily, [place], , 10 mg at 07/26/18 0840  •  magnesium sulfate 2 g in SWFI 50 mL IVPB (premix), 2 g, intravenous, Once PRN, Marko Crandall MD  •  metoprolol tartrate (LOPRESSOR) tablet 25 mg, 25 mg, oral, 2x daily, Marko Crandall MD, 25 mg at 07/26/18 0847  •  montelukast (SINGULAIR) tablet 10 mg, 10 mg, oral, Nightly, Marko Crandall MD, 10 mg at 07/25/18 2059  •  ondansetron (ZOFRAN) injection 4 mg, 4 mg, intravenous, q6h PRN, Marko Crandall MD  •  phenyleph-min oil-petrolatum (PREPARATION H) rectal ointment 1 application, 1 application, Topical, 4x daily PRN, Fredy Roa MD  •  [COMPLETED] methylPREDNISolone sod suc(PF) (Solu-MEDROL) injection 40 mg, 40 mg, intravenous, q6h, 40 mg at 07/24/18 2108 **FOLLOWED BY** predniSONE (DELTASONE) tablet 40 mg, 40 mg, oral, Daily, 40 mg at 07/26/18 0839 **FOLLOWED BY** [START ON 7/28/2018] predniSONE (DELTASONE) tablet 20 mg, 20 mg, oral, Daily, Marko Crandall MD  •  roflumilast (DALIRESP) tablet 500 mcg, 500 mcg, oral, Daily with lunch, Marko Crandall MD, 500 mcg at 07/26/18 1224  •  theophylline (JAZMYNE-24) 24 hr capsule 400 mg, 400 mg, oral, Daily, Fredy Roa MD, 400 mg at 07/26/18 0840  •  tiotropium (SPIRIVA) 1 capsule = 18 mcg, 1 capsule, inhalation, Daily, Margaux Dias MD  •  white petrolatum-mineral oil (LACRILUBE) ophthalmic ointment 1 application, 1 application, Both Eyes, 2x daily, Marko  MD Kar[place], 1 application at 07/24/18 2058  •  white petrolatum-mineral oil (LACRILUBE) ophthalmic ointment 1 application, 1 application, Both Eyes, PRN, Marko Crandall MD     Objective       VITAL SIGNS:  Temp:  [36.4 °C (97.5 °F)-36.7 °C (98.1 °F)] 36.4 °C (97.5 °F)  Heart Rate:  [] 93  Resp:  [20-28] 20  BP: ()/(58-76) 123/70    PHYSICAL EXAM:Patient consented to exam.  General: Appears comfortable and in no distress.   Neuro: No new focal deficits observed.  No motor or sensory deficits observed.  Cranial nerves intact.  Psychiatric: No hallucinations or delusions.   ENT: MMM and no acute oral pathology detected.  CVS: S1 S2 with equal pulses.  Resp: CTABL with good inspiratory effort.  Decreased air movement at the bilateral bases.   Abdomen: SNT with +BS. No guarding, rebound or tenderness.  Extremities: Good perfusion.  Radial pulses intact.        RESULTS AND DECISION MAKING:          Results from last 7 days  Lab Units 07/26/18  0819   WBC AUTO 10*3/uL 11.0*   HEMOGLOBIN g/dL 11.3*   HEMATOCRIT % 35.1   PLATELETS AUTO 10*3/uL 289       Results from last 7 days  Lab Units 07/26/18  0818   SODIUM mmol/L 144   POTASSIUM mmol/L 3.5   CHLORIDE mmol/L 105   CO2 mmol/L 28   BUN mg/dL 22   CREATININE mg/dL 0.7   CALCIUM mg/dL 9.2   GLUCOSE mg/dL 90       Results from last 7 days  Lab Units 07/24/18  0257   POCT PH, ARTERIAL PH 7.36   POCT PCO2, ARTERIAL MMHG 46.7*   POCT PO2, ARTERIAL MMHG 103.4*   POCT HCO3, ARTERIAL mmol/L 26.0   BEART mmol/L 0.2                                                 Lab Results   Component Value Date    FITXNACG92 651 06/14/2017       Results from last 7 days  Lab Units 07/26/18  0818   TSH uIU/ml 1.492       No results found for: PHENYTOIN, PHENOBARB, VPAT, CBMZ    No results found for:   No results found for: CEA  Lab Results   Component Value Date    DDIMER 0.23 04/09/2017           No results found for: HEPAIGM, HEPBIGM, HEPCAB, HEPBSAB, HEPBSAG  No components  found for: HIVCOMBO    Lab Results   Component Value Date    OCCULTBLD NEG 04/15/2015    OCCULTBLD NEG 04/15/2015     Lab Results   Component Value Date    CALCIUM 9.2 07/26/2018    PHOS 3.8 02/14/2018     No results found for: HCGQUAL, HCGPREGUR    No results found for: SPEP, UPEP      I have reviewed all the lab results.    IMAGING:  X-ray Chest 1 View    Result Date: 7/24/2018  Narrative: NOTE: This study was received from an outside source for PACS Storage.    Xr Chest Portable 1 View    Result Date: 7/24/2018  Narrative: PORTABLE CHEST:  07/24/2018, 0210 hours. CLINICAL HISTORY:  Respiratory failure    PROCEDURE/VIEW(S): Portable AP upright bedside study COMPARISON(S): Prior portable chest, 02/16/2018, 0605 hours. FINDINGS: LUNGS: 0.3 cm LLL granuloma. HEART: Normal size. AORTA: Normal. PULMONARY VASCULARITY: Normal. PLEURAL SPACES: Dry. No pneumothorax. SOFT TISSUES: Normal. OSSEOUS STRUCTURES: Intact. TUBES AND LINES: None. COMPARISON STUDIES: The left basilar infiltrate atelectasis seen on the prior study has cleared.     Impression: IMPRESSION: No active disease.            ASSESSMENT AND PLAN:     Assessment/Plan       Principal Problem:    COPD with acute exacerbation (CMS/HCC)  Active Problems:    Acute-on-chronic respiratory failure (CMS/HCC)    Chronic obstructive lung disease (CMS/HCC)    Systemic inflammatory response syndrome (CMS/HCC)    Obesity (BMI 30.0-34.9)    1.  COPD exacerbation: Plan to continue current management including azithromycin, prednisone (40 in the morning, 20 in the evening) will discontinue Atrovent and resume Spiriva.  Continue Xopenex as needed.  Patient is on Advair at home.  Will substitute and Symbicort and discontinue Pulmicort.  Urinary antigens negative.  Chest x-ray shows no active disease.  Appreciate pulmonology following.  Of note, Dr. Pham did review Dr. Perkins's outpatient notes.  It is noted that prognosis is extremely poor.  There was a note dated  6/11/18 indicating the lung transplant was not recommended for the patient.  Continue theophylline.  Will check a theophylline level.  Also start Mucinex and Tylenol as needed.    2.  Sinusitis: Patient does have long-standing congestion with current sinus pain in frontal and maxillary sinuses.  Will continue Augmentin.  Continue Flonase and I did discuss with her proper execution of the Flonase spray.  She may benefit from ENT as an outpatient.    Discharge planning: Patient can probably be discharged tomorrow.  Will check an exercise tolerance test for oxygen as she is on 5 L at rest.        DVT Prophylaxis: Lovenox    Code Status: Full Code    Time spent in this encounter: 35 minutes greater than 50% spent in counseling and coordination of care.    ALBERTINA BULL MD  12:25 PM, 7/26/2018

## 2018-07-26 NOTE — PROGRESS NOTES
Pulmonology PROGRESS NOTE    Date of Service: 7/26/2018    Time of Service: 10:45 AM    Patient name: Denita Spring  MRN: [id number]   LOS: 2 days     Subjective   Patient seen and examined.Chart reviewed.  Patient continues to have some shortness of breath, she does have cough and able to produce some sputum.  No hemoptysis.  No fever or chills.  No chest pain.  Overall she feels improvement but she is not back to her baseline yet    Review of Systems      Objective     Temp:  [36.4 °C (97.5 °F)-36.7 °C (98.1 °F)] 36.4 °C (97.5 °F)  Heart Rate:  [] 93  Resp:  [20-28] 20  BP: ()/(58-76) 123/70    I/O last 3 completed shifts:  In: 2103 [P.O.:1600; I.V.:3; IV Piggyback:500]  Out: 3925 [Urine:3925]    No intake/output data recorded.    Physical Exam   Constitutional: She is oriented to person, place, and time. No distress.   HENT:   Head: Normocephalic and atraumatic.   Mouth/Throat: Oropharynx is clear and moist.   Eyes: Conjunctivae are normal. Right eye exhibits no discharge. Left eye exhibits no discharge.   Neck: Neck supple.   Cardiovascular: Normal rate, regular rhythm and normal heart sounds.  Exam reveals no gallop.    Pulmonary/Chest: Effort normal. No respiratory distress. She has wheezes. She has no rales.   Abdominal: Soft. She exhibits no distension. There is no tenderness.   Musculoskeletal: She exhibits no edema or deformity.   Neurological: She is alert and oriented to person, place, and time. No cranial nerve deficit.   Skin: No rash noted. She is not diaphoretic.   Psychiatric: She has a normal mood and affect.         MEDICATIONS:      Current Facility-Administered Medications:   •  acetaminophen (TYLENOL) tablet 650 mg, 650 mg, oral, q4h PRN, Margaux Dias MD  •  ALPRAZolam (XANAX) tablet 0.25 mg, 0.25 mg, oral, Nightly PRN, Marko Crandall MD  •  amoxicillin-pot clavulanate (AUGMENTIN) 875-125 mg per tablet 1 tablet, 1 tablet, oral, 2x daily with meals, Marko Crandall MD, 1 tablet at  07/26/18 0840  •  artificial tears ophthalmic drops 1-2 drop, 1-2 drop, Both Eyes, PRN, Marko Crandall MD  •  ascorbic acid (vitamin C) (VITAMIN C) tablet 500 mg, 500 mg, oral, 2x daily, Marko Crandall MD, 500 mg at 07/26/18 0840  •  azithromycin (ZITHROMAX) 500 mg in normal saline 250 mL IVPB - VM, 500 mg, intravenous, q24h, Marko Crandall MD, Stopped at 07/26/18 0600  •  budesonide (PULMICORT) 1 mg/2 mL nebulizer solution 1 mg, 1 mg, nebulization, 2x daily, Marko Crandall MD, 1 mg at 07/26/18 0520  •  docusate sodium (COLACE) capsule 100 mg, 100 mg, oral, 2x daily PRN, Marko Crandall MD  •  enoxaparin (LOVENOX) syringe 40 mg, 40 mg, subcutaneous, Daily at 1400, Marko Crandall MD, 40 mg at 07/25/18 1547  •  esomeprazole (NexIUM) capsule 40 mg, 40 mg, oral, Daily at 1600, Fredy Roa MD, 40 mg at 07/25/18 1547  •  fentaNYL citrate (PF) 50 mcg/mL injection 25 mcg, 25 mcg, intravenous, q1h PRN, Marko Crandall MD  •  fentaNYL citrate (PF) 50 mcg/mL injection 50 mcg, 50 mcg, intravenous, q1h PRN, Marko Crandall MD  •  fluorescein ophthalmic strip 1 application, 1 application, Both Eyes, Once PRN, Marko Crandall MD  •  fluticasone (FLONASE) 50 mcg/actuation nasal spray 1 spray, 1 spray, Each Nostril, 2x daily, Fredy Roa MD, 1 spray at 07/26/18 0840  •  guaiFENesin (MUCINEX) 12 hr tablet 600 mg, 600 mg, oral, 2x daily, Margaux Dias MD  •  hydrocortisone 1 % cream, , Topical, 2x daily, Esther Gomez CNP, 1 application at 07/26/18 0842  •  ipratropium (ATROVENT) 0.02 % nebulizer solution 0.5 mg, 0.5 mg, nebulization, 3x daily, Marko Crandall MD, 0.5 mg at 07/26/18 0520  •  levalbuterol (XOPENEX) 1.25 mg/0.5 mL nebulizer solution 1.25 mg, 1.25 mg, nebulization, 3x daily, Marko Crandall MD, 1.25 mg at 07/26/18 0520  •  levalbuterol (XOPENEX) 1.25 mg/0.5 mL nebulizer solution 1.25 mg, 1.25 mg, nebulization, q4h PRN, Bayron Ragsdale MD  •  loratadine (CLARITIN) tablet 10 mg, 10 mg, oral, Daily, Alexsander  [place], DO, 10 mg at 07/26/18 0840  •  magnesium sulfate 2 g in SWFI 50 mL IVPB (premix), 2 g, intravenous, Once PRN, Marko Crandall MD  •  metoprolol tartrate (LOPRESSOR) tablet 25 mg, 25 mg, oral, 2x daily, Marko Crandall MD, 25 mg at 07/26/18 0847  •  montelukast (SINGULAIR) tablet 10 mg, 10 mg, oral, Nightly, Marko Crandall MD, 10 mg at 07/25/18 2059  •  ondansetron (ZOFRAN) injection 4 mg, 4 mg, intravenous, q6h PRN, Marko Crandall MD  •  phenyleph-min oil-petrolatum (PREPARATION H) rectal ointment 1 application, 1 application, Topical, 4x daily PRN, Fredy Roa MD  •  [COMPLETED] methylPREDNISolone sod suc(PF) (Solu-MEDROL) injection 40 mg, 40 mg, intravenous, q6h, 40 mg at 07/24/18 2108 **FOLLOWED BY** predniSONE (DELTASONE) tablet 40 mg, 40 mg, oral, Daily, 40 mg at 07/26/18 0839 **FOLLOWED BY** [START ON 7/28/2018] predniSONE (DELTASONE) tablet 20 mg, 20 mg, oral, Daily, Marko Crandall MD  •  roflumilast (DALIRESP) tablet 500 mcg, 500 mcg, oral, Daily with lunch, Marko Crandall MD, 500 mcg at 07/25/18 1146  •  theophylline (JAZMYNE-24) 24 hr capsule 400 mg, 400 mg, oral, Daily, Fredy Roa MD, 400 mg at 07/26/18 0840  •  white petrolatum-mineral oil (LACRILUBE) ophthalmic ointment 1 application, 1 application, Both Eyes, 2x daily, Marko Crandall MD, 1 application at 07/24/18 2058  •  white petrolatum-mineral oil (LACRILUBE) ophthalmic ointment 1 application, 1 application, Both Eyes, PRN, Marko Crandall MD      LABS    Results from last 7 days  Lab Units 07/26/18  0819   WBC AUTO 10*3/uL 11.0*   HEMOGLOBIN g/dL 11.3*   HEMATOCRIT % 35.1   PLATELETS AUTO 10*3/uL 289       Results from last 7 days  Lab Units 07/26/18  0818   SODIUM mmol/L 144   POTASSIUM mmol/L 3.5   CHLORIDE mmol/L 105   CO2 mmol/L 28   BUN mg/dL 22   CREATININE mg/dL 0.7   CALCIUM mg/dL 9.2   GLUCOSE mg/dL 90       Results from last 7 days  Lab Units 07/24/18  0257   POCT PH, ARTERIAL PH 7.36   POCT PCO2, ARTERIAL MMHG 46.7*    POCT PO2, ARTERIAL MMHG 103.4*   POCT HCO3, ARTERIAL mmol/L 26.0   BEART mmol/L 0.2                             Lab Results   Component Value Date    DDIMER 0.23 04/09/2017     * Cannot find OR log *        Assessment/Plan   Assessment:  Principal Problem:    COPD with acute exacerbation (CMS/HCC)  Active Problems:    Acute-on-chronic respiratory failure (CMS/HCC)    Chronic obstructive lung disease (CMS/HCC)    Systemic inflammatory response syndrome (CMS/HCC)    Obesity (BMI 30.0-34.9)    1. Acute on chronic hypoxemic respiratory failure:  Due to acute COPD exacerbation with underlying end stage O2 dependent COPD.  Patient was admitted initially to the ICU.  She was treated with IV steroids.  She was on BiPAP initially due to increased work of breathing.  Her ABG showed pH of 7.36 PCO2 46 PO2 103 and oxygen saturation 97%, she has been off BiPAP for the last 2 days.  Chest x-ray showed no new infiltrates or pneumonia.  Patient received IV Solu-Medrol in the first 2 days then switched to prednisone at 40 mg daily.  Continue steroid with subsequent tapering at the time of the discharge. Continue scheduled bronchodilator nebulizer.  Patient has been on Augmentin, she will complete a course as a part of treatment for COPD exacerbation.  May discontinue azithromycin.  Patient should resume her home inhalers and medications at the time of the discharge.      She will need to follow-up with Dr. Perkins (pulmonologist) as outpatient.         Electronically signed by: BRITT SURESH MD  7/26/2018  10:45 AM

## 2018-07-26 NOTE — PLAN OF CARE
Problem: RESPIRATORY - ADULT  Goal: Achieves optimal ventilation and oxygenation  INTERVENTIONS:  1. Assess for changes in respiratory status  2. Assess for changes in mentation and behavior  3. Position to facilitate oxygenation and minimize respiratory effort  4. Oxygen supplementation based on oxygen saturation or ABGs  5. Assess patient's ability to cough effectively  6. Encourage broncho-pulmonary hygiene including cough, deep breathe, pursed-lip breathing and tripod positioning  7. Assess and instruct to report SOB or any respiratory difficulty  8. Administer as needed and scheduled meter dosed inhalers and nebulizer treatments   Outcome: Progressing   07/26/18 1000   Interventions Appropriate for this Patient   Achieves optimal ventilation and oxygenation Assess for changes in respiratory status;Assess for changes in mentation and behavior;Position to facilitate oxygenation and minimize respiratory effort;Oxygen supplementation based on oxygen saturation or arterial blood gases;Assess patient's ability to cough effectively;Assess and instruct to report shortness of breath or any respiratory difficulty

## 2018-07-26 NOTE — INTERDISCIPLINARY/THERAPY
Care management [phone]    Chart reviewed, plan of care discussed in MDR. CM discussed options for home health if patient qualifies. Patient is agreeable to home health. CM called [facility] to check availability for [facility]. CM spoke with Sherin at [facility] [phone] Ext [phone] and she confirms that homemaking, personal care, and nursing are available. CM will send order to fax [phone]. Qwenty requests good phone number to reach patient at. CM confirmed with patient that [phone] and a working number and [facility] can contact her at this number to set up appointment. CM notified patient of same.     Home health order queued up.

## 2018-07-27 PROBLEM — J96.20 ACUTE-ON-CHRONIC RESPIRATORY FAILURE (CMS/HCC): Status: RESOLVED | Noted: 2017-12-02 | Resolved: 2018-07-27

## 2018-07-27 PROBLEM — R65.10 SYSTEMIC INFLAMMATORY RESPONSE SYNDROME (CMS/HCC): Status: RESOLVED | Noted: 2017-12-02 | Resolved: 2018-07-27

## 2018-07-27 PROBLEM — J44.1 COPD WITH ACUTE EXACERBATION (CMS/HCC): Status: RESOLVED | Noted: 2018-07-24 | Resolved: 2018-07-27

## 2018-07-27 LAB
ANION GAP SERPL CALC-SCNC: 7 MMOL/L (ref 3–11)
ANISOCYTOSIS PRESENCE IN BLOOD, ANALYZER: ABNORMAL
BASOPHILS # BLD AUTO: 0.1 10*3/UL
BASOPHILS NFR BLD AUTO: 1 % (ref 0–2)
BUN SERPL-MCNC: 17 MG/DL (ref 7–25)
CALCIUM SERPL-MCNC: 9.2 MG/DL (ref 8.6–10.3)
CHLORIDE SERPL-SCNC: 104 MMOL/L (ref 98–107)
CO2 SERPL-SCNC: 33 MMOL/L (ref 21–32)
CREAT SERPL-MCNC: 0.6 MG/DL (ref 0.6–1.2)
EOSINOPHIL # BLD AUTO: 0 10*3/UL
EOSINOPHIL NFR BLD AUTO: 1 % (ref 0–3)
ERYTHROCYTE [DISTWIDTH] IN BLOOD BY AUTOMATED COUNT: 18.6 % (ref 11.5–14)
GFR SERPL CREATININE-BSD FRML MDRD: 103 ML/MIN/1.73M*2
GLUCOSE SERPL-MCNC: 91 MG/DL (ref 70–105)
HCT VFR BLD AUTO: 35.6 % (ref 34–45)
HGB BLD-MCNC: 11.6 G/DL (ref 11.5–15.5)
HYPOCHROMIA PRESENCE IN BLOOD, ANALYZER: ABNORMAL
LYMPHOCYTES # BLD AUTO: 3 10*3/UL
LYMPHOCYTES NFR BLD AUTO: 29 % (ref 11–47)
MCH RBC QN AUTO: 25.8 PG (ref 28–33)
MCHC RBC AUTO-ENTMCNC: 32.6 G/DL (ref 32–36)
MCV RBC AUTO: 79.2 FL (ref 81–97)
MICROCYTOSIS PRESENCE IN BLOOD, ANALYZER: ABNORMAL
MONOCYTES # BLD AUTO: 0.9 10*3/UL
MONOCYTES NFR BLD AUTO: 9 % (ref 3–11)
NEUTROPHILS # BLD AUTO: 6.2 10*3/UL
NEUTROPHILS NFR BLD AUTO: 61 % (ref 41–81)
PLATELET # BLD AUTO: 294 10*3/UL (ref 140–350)
PMV BLD AUTO: 6.8 FL (ref 6.9–10.8)
POTASSIUM SERPL-SCNC: 3.9 MMOL/L (ref 3.5–5.1)
RBC # BLD AUTO: 4.5 10*6/ΜL (ref 3.7–5.3)
SODIUM SERPL-SCNC: 144 MMOL/L (ref 135–145)
WBC # BLD AUTO: 10.2 10*3/UL (ref 4.5–10.5)

## 2018-07-27 PROCEDURE — 93010 ELECTROCARDIOGRAM REPORT: CPT | Performed by: INTERNAL MEDICINE

## 2018-07-27 PROCEDURE — 6360000200 HC RX 636 W HCPCS (ALT 250 FOR IP): Performed by: INTERNAL MEDICINE

## 2018-07-27 PROCEDURE — 94761 N-INVAS EAR/PLS OXIMETRY MLT: CPT

## 2018-07-27 PROCEDURE — 94640 AIRWAY INHALATION TREATMENT: CPT

## 2018-07-27 PROCEDURE — 99233 SBSQ HOSP IP/OBS HIGH 50: CPT | Performed by: FAMILY MEDICINE

## 2018-07-27 PROCEDURE — (BLANK) HC ROOM PRIVATE

## 2018-07-27 PROCEDURE — 80048 BASIC METABOLIC PNL TOTAL CA: CPT | Performed by: FAMILY MEDICINE

## 2018-07-27 PROCEDURE — 6370000100 HC RX 637 (ALT 250 FOR IP): Performed by: INTERNAL MEDICINE

## 2018-07-27 PROCEDURE — 6370000100 HC RX 637 (ALT 250 FOR IP): Performed by: STUDENT IN AN ORGANIZED HEALTH CARE EDUCATION/TRAINING PROGRAM

## 2018-07-27 PROCEDURE — 2580000300 HC RX 258: Performed by: STUDENT IN AN ORGANIZED HEALTH CARE EDUCATION/TRAINING PROGRAM

## 2018-07-27 PROCEDURE — 85025 COMPLETE CBC W/AUTO DIFF WBC: CPT | Performed by: FAMILY MEDICINE

## 2018-07-27 PROCEDURE — 93005 ELECTROCARDIOGRAM TRACING: CPT | Performed by: FAMILY MEDICINE

## 2018-07-27 PROCEDURE — 36415 COLL VENOUS BLD VENIPUNCTURE: CPT | Performed by: FAMILY MEDICINE

## 2018-07-27 PROCEDURE — 6360000200 HC RX 636 W HCPCS (ALT 250 FOR IP): Performed by: STUDENT IN AN ORGANIZED HEALTH CARE EDUCATION/TRAINING PROGRAM

## 2018-07-27 PROCEDURE — 6370000100 HC RX 637 (ALT 250 FOR IP): Performed by: FAMILY MEDICINE

## 2018-07-27 RX ORDER — PREDNISONE 10 MG/1
TABLET ORAL
Qty: 100 TABLET | Refills: 0 | Status: SHIPPED | OUTPATIENT
Start: 2018-07-27 | End: 2018-07-28

## 2018-07-27 RX ORDER — AZITHROMYCIN 250 MG/1
TABLET, FILM COATED ORAL
Qty: 1 TABLET | Refills: 0 | Status: SHIPPED | OUTPATIENT
Start: 2018-07-28 | End: 2018-07-28 | Stop reason: HOSPADM

## 2018-07-27 RX ORDER — AMOXICILLIN AND CLAVULANATE POTASSIUM 875; 125 MG/1; MG/1
1 TABLET, FILM COATED ORAL 2 TIMES DAILY WITH MEALS
Qty: 10 TABLET | Refills: 0 | Status: SHIPPED | OUTPATIENT
Start: 2018-07-27 | End: 2018-07-28

## 2018-07-27 RX ORDER — METOPROLOL TARTRATE 25 MG/1
12.5 TABLET, FILM COATED ORAL ONCE
Status: DISCONTINUED | OUTPATIENT
Start: 2018-07-27 | End: 2018-07-28 | Stop reason: HOSPADM

## 2018-07-27 RX ADMIN — AZITHROMYCIN MONOHYDRATE 500 MG: 500 INJECTION, POWDER, LYOPHILIZED, FOR SOLUTION INTRAVENOUS at 05:23

## 2018-07-27 RX ADMIN — IPRATROPIUM BROMIDE 0.5 MG: 0.5 SOLUTION RESPIRATORY (INHALATION) at 13:22

## 2018-07-27 RX ADMIN — ACETAMINOPHEN 650 MG: 325 TABLET ORAL at 19:08

## 2018-07-27 RX ADMIN — GUAIFENESIN 600 MG: 600 TABLET, EXTENDED RELEASE ORAL at 20:38

## 2018-07-27 RX ADMIN — FLUTICASONE PROPIONATE 1 SPRAY: 50 SPRAY, METERED NASAL at 09:00

## 2018-07-27 RX ADMIN — LEVALBUTEROL 1.25 MG: 1.25 SOLUTION, CONCENTRATE RESPIRATORY (INHALATION) at 07:07

## 2018-07-27 RX ADMIN — THEOPHYLLINE ANHYDROUS 400 MG: 200 CAPSULE, EXTENDED RELEASE ORAL at 08:59

## 2018-07-27 RX ADMIN — GUAIFENESIN 600 MG: 600 TABLET, EXTENDED RELEASE ORAL at 09:00

## 2018-07-27 RX ADMIN — AMOXICILLIN AND CLAVULANATE POTASSIUM 1 TABLET: 875; 125 TABLET, FILM COATED ORAL at 17:38

## 2018-07-27 RX ADMIN — MONTELUKAST SODIUM 10 MG: 10 TABLET, FILM COATED ORAL at 20:38

## 2018-07-27 RX ADMIN — ESOMEPRAZOLE MAGNESIUM 40 MG: 40 CAPSULE, DELAYED RELEASE ORAL at 15:45

## 2018-07-27 RX ADMIN — METOPROLOL TARTRATE 25 MG: 25 TABLET ORAL at 08:59

## 2018-07-27 RX ADMIN — AMOXICILLIN AND CLAVULANATE POTASSIUM 1 TABLET: 875; 125 TABLET, FILM COATED ORAL at 09:00

## 2018-07-27 RX ADMIN — METOPROLOL TARTRATE 25 MG: 25 TABLET ORAL at 20:38

## 2018-07-27 RX ADMIN — IPRATROPIUM BROMIDE 0.5 MG: 0.5 SOLUTION RESPIRATORY (INHALATION) at 19:39

## 2018-07-27 RX ADMIN — ROFLUMILAST 500 MCG: 500 TABLET ORAL at 11:53

## 2018-07-27 RX ADMIN — IPRATROPIUM BROMIDE 0.5 MG: 0.5 SOLUTION RESPIRATORY (INHALATION) at 07:06

## 2018-07-27 RX ADMIN — METOPROLOL TARTRATE 25 MG: 25 TABLET ORAL at 17:28

## 2018-07-27 RX ADMIN — ENOXAPARIN SODIUM 40 MG: 100 INJECTION SUBCUTANEOUS at 15:43

## 2018-07-27 RX ADMIN — ACETAMINOPHEN 650 MG: 325 TABLET ORAL at 06:37

## 2018-07-27 RX ADMIN — OXYCODONE HYDROCHLORIDE AND ACETAMINOPHEN 500 MG: 500 TABLET ORAL at 20:38

## 2018-07-27 RX ADMIN — PREDNISONE 40 MG: 20 TABLET ORAL at 09:00

## 2018-07-27 RX ADMIN — HYDROCORTISONE 1 APPLICATION: 1 CREAM TOPICAL at 09:00

## 2018-07-27 RX ADMIN — LORATADINE 10 MG: 10 TABLET ORAL at 09:00

## 2018-07-27 RX ADMIN — LEVALBUTEROL 1.25 MG: 1.25 SOLUTION, CONCENTRATE RESPIRATORY (INHALATION) at 19:39

## 2018-07-27 RX ADMIN — LEVALBUTEROL 1.25 MG: 1.25 SOLUTION, CONCENTRATE RESPIRATORY (INHALATION) at 13:22

## 2018-07-27 RX ADMIN — ALPRAZOLAM 0.25 MG: 0.25 TABLET ORAL at 20:38

## 2018-07-27 RX ADMIN — OXYCODONE HYDROCHLORIDE AND ACETAMINOPHEN 500 MG: 500 TABLET ORAL at 08:59

## 2018-07-27 NOTE — NURSING END OF SHIFT
Nursing End of Shift Summary    Goals:  Clinical Goals for the Shift: stable respiration, rest    Narrative Summary of Progress Towards Clinical Goals:  Respiration is improved.    Barriers for Transfer or Discharge: yes   Respiratory distress on ambulation.

## 2018-07-27 NOTE — INTERDISCIPLINARY/THERAPY
07/27/18 0916   O2 Discharge Determination   Activity Exercise;Resting   $ Multiple SpO2 Levels Obtained? Yes   O2 Discharge Determination Activity: Resting   Resting Lowest SPO2 (%) on Room Air 81 %   Resting Oxygen Delivery Interface Nasal cannula   Resting Oxygen Requirement (lpm) 1 lpm   Resting SPO2 (%) on Final Need of Oxygen 92 %   O2 Discharge Determination Activity: Exercise   Exercise Lowest SPO2 (%) on Room Air 80 %   Exercise Oxygen Delivery Interface Nasal cannula   Exercise Oxygen Requirement (lpm) 5 lpm   Exercise SPO2 (%) on Final Need of Oxygen 91 %   Exercise SPO2 (%) on O2 at 4lpm 86 %

## 2018-07-27 NOTE — PROGRESS NOTES
INTERNAL/FAMILY MEDICINE DAILY PROGRESS NOTE   LOS: 3 days     Subjective          Reviewed chart, discussed with nursing. Patient seen in face to face encounter.   Patient feeling better today, denies any new symptoms including chest pain and shortness of breath. Pain is controlled. Discussed patient's diagnoses, diagnostic results, and plan of care. All questions of the patient and family were answered to their satisfaction.       Current Facility-Administered Medications:   •  acetaminophen (TYLENOL) tablet 650 mg, 650 mg, oral, q4h PRN, Margaux Dias MD, 650 mg at 07/27/18 0637  •  ALPRAZolam (XANAX) tablet 0.25 mg, 0.25 mg, oral, Nightly PRN, Marko Crandall MD  •  amoxicillin-pot clavulanate (AUGMENTIN) 875-125 mg per tablet 1 tablet, 1 tablet, oral, 2x daily with meals, Marko Crandall MD, 1 tablet at 07/27/18 0900  •  artificial tears ophthalmic drops 1-2 drop, 1-2 drop, Both Eyes, PRN, Marko Crandall MD  •  ascorbic acid (vitamin C) (VITAMIN C) tablet 500 mg, 500 mg, oral, 2x daily, Marko Crandall MD, 500 mg at 07/27/18 0859  •  azithromycin (ZITHROMAX) 500 mg in normal saline 250 mL IVPB - VM, 500 mg, intravenous, q24h, Marko Crandall MD, Stopped at 07/27/18 0623  •  docusate sodium (COLACE) capsule 100 mg, 100 mg, oral, 2x daily PRN, Marko Crandall MD  •  enoxaparin (LOVENOX) syringe 40 mg, 40 mg, subcutaneous, Daily at 1400, Marko Crandall MD, 40 mg at 07/26/18 1416  •  esomeprazole (NexIUM) capsule 40 mg, 40 mg, oral, Daily at 1600, Fredy Roa MD, 40 mg at 07/26/18 1738  •  fentaNYL citrate (PF) 50 mcg/mL injection 25 mcg, 25 mcg, intravenous, q1h PRN, Marko Crandall MD  •  fentaNYL citrate (PF) 50 mcg/mL injection 50 mcg, 50 mcg, intravenous, q1h PRN, Marko Crandall MD  •  fluorescein ophthalmic strip 1 application, 1 application, Both Eyes, Once PRN, Marko Crandall MD  •  fluticasone (FLONASE) 50 mcg/actuation nasal spray 1 spray, 1 spray, Each Nostril, 2x daily, Fredy Roa MD, 1  spray at 07/27/18 0900  •  guaiFENesin (MUCINEX) 12 hr tablet 600 mg, 600 mg, oral, 2x daily, Margaux Dias MD, 600 mg at 07/27/18 0900  •  hydrocortisone 1 % cream, , Topical, 2x daily, Esther Gomez CNP, 1 application at 07/27/18 0900  •  ipratropium (ATROVENT) 0.02 % nebulizer solution 0.5 mg, 0.5 mg, nebulization, 3x daily, Laith Chau MD, 0.5 mg at 07/27/18 1322  •  levalbuterol (XOPENEX) 1.25 mg/0.5 mL nebulizer solution 1.25 mg, 1.25 mg, nebulization, 3x daily, Marko Crandall MD, 1.25 mg at 07/27/18 1322  •  levalbuterol (XOPENEX) 1.25 mg/0.5 mL nebulizer solution 1.25 mg, 1.25 mg, nebulization, q4h PRN, Bayron Ragsdale MD  •  loratadine (CLARITIN) tablet 10 mg, 10 mg, oral, Daily, Alexsander Park DO, 10 mg at 07/27/18 0900  •  magnesium sulfate 2 g in SWFI 50 mL IVPB (premix), 2 g, intravenous, Once PRN, Marko Crandall MD  •  metoprolol tartrate (LOPRESSOR) tablet 25 mg, 25 mg, oral, 2x daily, Marko Crandall MD, 25 mg at 07/27/18 0859  •  montelukast (SINGULAIR) tablet 10 mg, 10 mg, oral, Nightly, Marko Crandall MD, 10 mg at 07/26/18 2110  •  ondansetron (ZOFRAN) injection 4 mg, 4 mg, intravenous, q6h PRN, Marko Crandall MD  •  phenyleph-min oil-petrolatum (PREPARATION H) rectal ointment 1 application, 1 application, Topical, 4x daily PRN, Fredy Roa MD  •  [COMPLETED] methylPREDNISolone sod suc(PF) (Solu-MEDROL) injection 40 mg, 40 mg, intravenous, q6h, 40 mg at 07/24/18 2108 **FOLLOWED BY** [COMPLETED] predniSONE (DELTASONE) tablet 40 mg, 40 mg, oral, Daily, 40 mg at 07/27/18 0900 **FOLLOWED BY** [START ON 7/28/2018] predniSONE (DELTASONE) tablet 20 mg, 20 mg, oral, Daily, Marko Crandall MD  •  roflumilast (DALIRESP) tablet 500 mcg, 500 mcg, oral, Daily with lunch, Marko Crandall MD, 500 mcg at 07/27/18 1153  •  theophylline (JAZMYNE-24) 24 hr capsule 400 mg, 400 mg, oral, Daily, Fredy Roa MD, 400 mg at 07/27/18 0859  •  white petrolatum-mineral oil (LACRILUBE) ophthalmic ointment  1 application, 1 application, Both Eyes, 2x daily, Marko Crandall MD, 1 application at 07/24/18 2058  •  white petrolatum-mineral oil (LACRILUBE) ophthalmic ointment 1 application, 1 application, Both Eyes, PRN, Marko Crandall MD     Objective       VITAL SIGNS:  Temp:  [36.5 °C (97.7 °F)] 36.5 °C (97.7 °F)  Heart Rate:  [] 141  Resp:  [18-22] 20  BP: (111-112)/(68-78) 112/78    PHYSICAL EXAM:   Patient consented to exam.  General: Appears comfortable and in no distress.   Neuro: No new focal deficits observed.  No motor or sensory deficits observed.  Cranial nerves intact.  Psychiatric: No hallucinations or delusions.   ENT: MMM and no acute oral pathology detected.  CVS: S1 S2 with equal pulses.  Resp: CTABL with good inspiratory effort.  Abdomen: SNT with +BS. No guarding, rebound or tenderness.  Extremities: Good perfusion.  Radial pulses intact.        RESULTS AND DECISION MAKING:          Results from last 7 days  Lab Units 07/27/18  0828 07/26/18  0819   WBC AUTO 10*3/uL 10.2 11.0*   HEMOGLOBIN g/dL 11.6 11.3*   HEMATOCRIT % 35.6 35.1   PLATELETS AUTO 10*3/uL 294 289       Results from last 7 days  Lab Units 07/27/18  0829 07/26/18  0818   SODIUM mmol/L 144 144   POTASSIUM mmol/L 3.9 3.5   CHLORIDE mmol/L 104 105   CO2 mmol/L 33* 28   BUN mg/dL 17 22   CREATININE mg/dL 0.6 0.7   CALCIUM mg/dL 9.2 9.2   GLUCOSE mg/dL 91 90       Results from last 7 days  Lab Units 07/24/18  0257   POCT PH, ARTERIAL PH 7.36   POCT PCO2, ARTERIAL MMHG 46.7*   POCT PO2, ARTERIAL MMHG 103.4*   POCT HCO3, ARTERIAL mmol/L 26.0   BEART mmol/L 0.2                                                 Lab Results   Component Value Date    [id number] 651 06/14/2017       Results from last 7 days  Lab Units 07/26/18  0818   TSH uIU/ml 1.492       No results found for: PHENYTOIN, PHENOBARB, VPAT, CBMZ    No results found for:   No results found for: CEA  Lab Results   Component Value Date    DDIMER 0.23 04/09/2017           No  results found for: HEPAIGM, HEPBIGM, HEPCAB, HEPBSAB, HEPBSAG  No components found for: HIVCOMBO    Lab Results   Component Value Date    OCCULTBLD NEG 04/15/2015    OCCULTBLD NEG 04/15/2015     Lab Results   Component Value Date    CALCIUM 9.2 07/27/2018    PHOS 3.8 02/14/2018     No results found for: HCGQUAL, HCGPREGUR    No results found for: SPEP, UPEP      I have reviewed all the lab results.    IMAGING:  X-ray Chest 1 View    Result Date: 7/24/2018  Narrative: NOTE: This study was received from an outside source for PACS Storage.    Xr Chest Portable 1 View    Result Date: 7/24/2018  Narrative: PORTABLE CHEST:  07/24/2018, 0210 hours. CLINICAL HISTORY:  Respiratory failure    PROCEDURE/VIEW(S): Portable AP upright bedside study COMPARISON(S): Prior portable chest, 02/16/2018, 0605 hours. FINDINGS: LUNGS: 0.3 cm LLL granuloma. HEART: Normal size. AORTA: Normal. PULMONARY VASCULARITY: Normal. PLEURAL SPACES: Dry. No pneumothorax. SOFT TISSUES: Normal. OSSEOUS STRUCTURES: Intact. TUBES AND LINES: None. COMPARISON STUDIES: The left basilar infiltrate atelectasis seen on the prior study has cleared.     Impression: IMPRESSION: No active disease.            ASSESSMENT AND PLAN:     Assessment/Plan       Active Problems:    Chronic obstructive lung disease (CMS/HCC)    Obesity (BMI 30.0-34.9)    1.  COPD exacerbation: Plan to continue current management including azithromycin, prednisone (40 in the morning, 20 in the evening) will discontinue Atrovent and resume Spiriva.  Continue Xopenex as needed.  Patient is on Advair at home.  Will substitute and Symbicort and discontinue Pulmicort.  Urinary antigens negative.  Chest x-ray shows no active disease.  Appreciate pulmonology following.  Of note, Dr. Pham did review Dr. Perkins's outpatient notes.  It is noted that prognosis is extremely poor.  There was a note dated 6/11/18 indicating the lung transplant was not recommended for the patient.  Continue  theophylline.  Will check a theophylline level.  Also start Mucinex and Tylenol as needed.  As of 7/27 patient's lungs were at baseline.  Anticipated discharging her home.  Unfortunately she became tachycardic.     2.  Sinusitis: Patient does have long-standing congestion with current sinus pain in frontal and maxillary sinuses.  Will continue Augmentin.  Continue Flonase and I did discuss with her proper execution of the Flonase spray.  She may benefit from ENT as an outpatient.     3.  Tachycardia: 7/27 patient noted to have a tachycardia in the 140s.  She was asymptomatic aside from her baseline shortness of breath.  This was at rest.  Will obtain an EKG, start telemetry and hold off on discharge.    Discharge planning: Patient can probably be discharged tomorrow.  Will check an exercise tolerance test for oxygen as she is on 5 L at rest.           DVT Prophylaxis: Lovenox     Code Status: Full Code     Time spent in this encounter: 35 minutes greater than 50% spent in counseling and coordination of care.     ALBERTINA BULL MD  2:15 PM, 7/27/2018

## 2018-07-27 NOTE — INTERDISCIPLINARY/THERAPY
Care Management [phone]    1039 -Chart reviewed, plan of care discussed in MDR. Plan for discharge later today. Patient states Sher, her son can transport her home. CM will continue to monitor.    1550- Patient became tachycardic and will not discharge today.    CM called [facility] to see if they needed new oxygen order. [facility] personnel states they will need new order. Order placed for discharge oxygen determination for tomorrow as patient may discharge tomorrow.

## 2018-07-27 NOTE — DISCHARGE SUMMARY
Inpatient Discharge Summary    BRIEF OVERVIEW    Admitting Provider: Alexsander Park DO  Discharge Provider: Margaux Dias MD  Consultations: pulmonology  Primary Care Physician at Discharge: [facility] [phone]     Admission Date: 2018     Discharge Date: 2018    Primary Discharge Diagnosis  Copd exacerbation    Secondary Discharge Diagnosis  sinusitis    PAST MEDICAL HISTORY  Past Medical History:   Diagnosis Date   • Arthritis    • Asthma    • Cancer (CMS/[facility])     cervical CA   • COPD (chronic obstructive pulmonary disease) (CMS/[facility])    • History of transfusion    • Obesity (BMI 30.0-34.9) 2018   • Osteoporosis    • Pneumonia    • Wears dentures        PAST SURGICAL HISTORY  Past Surgical History:   Procedure Laterality Date   • CERVICAL BIOPSY     •  SECTION      x4   • COLONOSCOPY  10/01/2016   • COLONOSCOPY     • COSMETIC SURGERY     • OTHER SURGICAL HISTORY      Cervical ablation for HPV   • OTHER SURGICAL HISTORY      Endotracheal tube placement   • OTHER SURGICAL HISTORY      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     Social History Main Topics   • Smoking status: Former Smoker     Packs/day: 1.00     Quit date:    • Smokeless tobacco: Former User   • Alcohol use No   • Drug use: No   • Sexual activity: Defer     Other Topics Concern   • Not on file     Social History Narrative   • No narrative on file       Family History:  family history includes Cataracts in her father; Diabetes in her mother; Heart disease in her brother; Hypertension in her father; Other in her  father.    Allergies:  Allergies   Allergen Reactions   • Diphenhydramine      DENIES   • Cefuroxime      SUNBURN   • Cefuroxime Axetil    • Diphenhydramine Hcl    • Erythromycin Lactobionate    • Methylphenidate      ON FIRE   • Metoclopramide      dizzy, sweating   • Propoxyphene    • Propoxyphene N-Acetaminophen    • Tramadol      PARALYSIS   • Codeine      oxygen gets really low   • Erythromycin Base      GI UPSET   • Ibuprofen      DENIES   • Latex      DRY MOUTH       Discharge Disposition  01 - Home or Self-Care  Code Status at Discharge: Full Code     Discharge medication list      ASK your doctor about these medications      Instructions   ADVAIR DISKUS 500-50 mcg/dose diskus inhaler  Generic drug:  fluticasone-salmeterol      PROAIR HFA 90 mcg/actuation inhaler  Generic drug:  albuterol HFA      albuterol 2.5 mg/3 mL nebulizer solution   Take 3 mL (2.5 mg total) by nebulization every 6 (six) hours as needed for wheezing.     alendronate 70 mg tablet  Commonly known as:  FOSAMAX      ALPRAZolam 0.25 mg tablet  Commonly known as:  XANAX      ascorbic acid (vitamin C) 500 mg tablet  Commonly known as:  VITAMIN C      budesonide 0.5 mg/2 mL nebulizer solution  Commonly known as:  PULMICORT      cetirizine 10 mg tablet  Commonly known as:  ZyrTEC      COMBIVENT RESPIMAT  mcg/actuation inhaler  Generic drug:  ipratropium-albuterol      DALIRESP 500 mcg tablet  Generic drug:  roflumilast      diltiazem  mg 24 hr capsule  Commonly known as:  CARDIZEM CD   Take 1 capsule (240 mg total) by mouth daily.     docusate sodium 100 mg capsule  Commonly known as:  COLACE      ferrous sulfate 325 mg (65 mg iron) tablet      FISH -1,000 mg capsule  Generic drug:  omega-3 fatty acids-fish oil      fluticasone 50 mcg/actuation nasal spray  Commonly known as:  FLONASE      guaiFENesin 600 mg 12 hr tablet  Commonly known as:  MUCINEX      hydrocortisone 1 % cream      ibuprofen 800 mg tablet  Commonly known  as:  ADVIL,MOTRIN      metFORMIN 500 mg tablet  Commonly known as:  GLUCOPHAGE      methyl salicylate-menthol 15-10 % cream      metoprolol tartrate 25 mg tablet  Commonly known as:  LOPRESSOR   Take 1 tablet (25 mg total) by mouth 2 (two) times a day.     montelukast 10 mg tablet  Commonly known as:  SINGULAIR      multivitamin tablet tablet  Commonly known as:  THERAGRAN      nystatin cream  Commonly known as:  MYCOSTATIN      nystatin 100,000 unit/mL suspension  Commonly known as:  MYCOSTATIN   Take 4 mL (400,000 Units total) by mouth 4 (four) times a day.     omeprazole 20 mg capsule  Commonly known as:  PriLOSEC      ranitidine 150 mg tablet  Commonly known as:  ZANTAC      simethicone 80 mg chewable tablet  Commonly known as:  MYLICON      sodium chloride 0.65 % nasal spray  Commonly known as:  OCEAN      SPIRIVA WITH HANDIHALER 1 capsule = per inhalation capsule  Generic drug:  tiotropium      theophylline 200 mg 12 hr tablet  Commonly known as:  THEODUR      TUMS E-X 300 mg (750 mg) chewable tablet  Generic drug:  calcium carbonate EX      TYLENOL 325 mg tablet  Generic drug:  acetaminophen      VITAMIN D2 50,000 unit capsule  Generic drug:  ergocalciferol             Diet: as tolerated  Activity: as tolerated  Rehab: none    Active Issues Requiring Follow-up  Follow-up Appointments Arranged:   See your pcp within 1-2 weeks as needed for any medical problems not addressed in the hospital.   Follow up with pulmonology within 2 weeks.   See ENT when appointment is scheduled.         Test Results Pending at Discharge        Additional instructions  Return to the ED if your breathing gets worse.   Take your medications as prescribed.               DETAILS OF HOSPITAL STAY    Presenting Problem/History of Present Illness  COPD exacerbation (CMS/[facility]) [J44.1]      Hospital Course  Patient was admitted with a COPD exacerbation.  At home she had recently been on 10 mg of prednisone twice daily.  This was increased to  40 in the morning and 20 at night.  On discharge she will be prescribed a long slow taper.  Her home medications were continued.  She was started on azithromycin due to some increased sputum production.  She will be discharged to finish this course with 1 more day tomorrow.  She is at her baseline for oxygen.  She will follow-up with pulmonology as an outpatient.  She also was noted to have some chronic sinusitis with sinus pain.  She was started on Augmentin for this.  She is also on Flonase but was noted to not be using it properly.  This was discussed.  She will be referred to ENT as an outpatient.  Addendum: Patient was going to be discharged 7/27 however she was noted to have tachycardia in the 140s with any movement.  Therefore her discharge was held.  It seems that her oxygen had been turned down to 0.5-1 L although at home her baseline is 4 L of oxygen.  Once her oxygen was turned up to a minimum of 3 L she was much more comfortable and did not have a tachycardia.  She is being discharged in stable condition 7/28.      Operative Procedures Performed  None    Important tests on this admission:  X-ray chest 1 view   Final Result      XR chest portable 1 view   Final Result   IMPRESSION:   No active disease.                   X-ray Chest 1 View    Result Date: 7/24/2018  Narrative: NOTE: This study was received from an outside source for PACS Storage.    Xr Chest Portable 1 View    Result Date: 7/24/2018  Narrative: PORTABLE CHEST:  07/24/2018, 0210 hours. CLINICAL HISTORY:  Respiratory failure    PROCEDURE/VIEW(S): Portable AP upright bedside study COMPARISON(S): Prior portable chest, 02/16/2018, 0605 hours. FINDINGS: LUNGS: 0.3 cm LLL granuloma. HEART: Normal size. AORTA: Normal. PULMONARY VASCULARITY: Normal. PLEURAL SPACES: Dry. No pneumothorax. SOFT TISSUES: Normal. OSSEOUS STRUCTURES: Intact. TUBES AND LINES: None. COMPARISON STUDIES: The left basilar infiltrate atelectasis seen on the prior study has  cleared.     Impression: IMPRESSION: No active disease.            Labs:    Results from last 7 days  Lab Units 07/27/18  0828 07/26/18  0819   WBC AUTO 10*3/uL 10.2 11.0*   HEMOGLOBIN g/dL 11.6 11.3*   HEMATOCRIT % 35.6 35.1   PLATELETS AUTO 10*3/uL 294 289       Results from last 7 days  Lab Units 07/27/18  0829 07/26/18  0818   SODIUM mmol/L 144 144   POTASSIUM mmol/L 3.9 3.5   CHLORIDE mmol/L 104 105   CO2 mmol/L 33* 28   BUN mg/dL 17 22   CREATININE mg/dL 0.6 0.7   CALCIUM mg/dL 9.2 9.2   GLUCOSE mg/dL 91 90       Results from last 7 days  Lab Units 07/24/18  0257   POCT PH, ARTERIAL PH 7.36   POCT PCO2, ARTERIAL MMHG 46.7*   POCT PO2, ARTERIAL MMHG 103.4*   POCT HCO3, ARTERIAL mmol/L 26.0   BEART mmol/L 0.2                                                   Lab Results   Component Value Date    [id number] 651 06/14/2017       Results from last 7 days  Lab Units 07/26/18  0818   TSH uIU/ml 1.492       No results found for: PHENYTOIN, PHENOBARB, VPAT, CBMZ    No results found for:   No results found for: CEA  Lab Results   Component Value Date    DDIMER 0.23 04/09/2017           No results found for: HEPAIGM, HEPBIGM, HEPCAB, HEPBSAB, HEPBSAG  No components found for: HIVCOMBO    Lab Results   Component Value Date    OCCULTBLD NEG 04/15/2015    OCCULTBLD NEG 04/15/2015     Lab Results   Component Value Date    CALCIUM 9.2 07/27/2018    PHOS 3.8 02/14/2018     No results found for: HCGQUAL, HCGPREGUR    No results found for: SPEP, UPEP      Physical Exam at Discharge  Discharge Condition: fair  Heart Rate: 118  Resp: 18  BP: 112/78  Temp: 36.5 °C (97.7 °F)  Weight:        Patient consented to exam.  General: Appears comfortable and in no distress.   Neuro: No new focal deficits observed.  No motor or sensory deficits observed.  Cranial nerves intact.  Psychiatric: No hallucinations or delusions.   ENT: MMM and no acute oral pathology detected.  CVS: S1 S2 with equal pulses.  Resp: CTABL with good inspiratory  effort.  Abdomen: SNT with +BS. No guarding, rebound or tenderness.  Extremities: Good perfusion.  Radial pulses intact.        Time spent coordinating discharge: 35 minutes greater than 50% spent in counseling and coordination of care.  Patient seen on individual rounds as well as multidisciplinary rounds this morning.    ALBERTINA BULL MD

## 2018-07-27 NOTE — PLAN OF CARE
Problem: RESPIRATORY - ADULT  Goal: Achieves optimal ventilation and oxygenation  INTERVENTIONS:  1. Assess for changes in respiratory status  2. Assess for changes in mentation and behavior  3. Position to facilitate oxygenation and minimize respiratory effort  4. Oxygen supplementation based on oxygen saturation or ABGs  5. Assess patient's ability to cough effectively  6. Encourage broncho-pulmonary hygiene including cough, deep breathe, pursed-lip breathing and tripod positioning  7. Assess and instruct to report SOB or any respiratory difficulty  8. Administer as needed and scheduled meter dosed inhalers and nebulizer treatments    07/26/18 1000   Interventions Appropriate for this Patient   Achieves optimal ventilation and oxygenation Assess for changes in respiratory status;Assess for changes in mentation and behavior;Position to facilitate oxygenation and minimize respiratory effort;Oxygen supplementation based on oxygen saturation or arterial blood gases;Assess patient's ability to cough effectively;Assess and instruct to report shortness of breath or any respiratory difficulty

## 2018-07-28 VITALS
HEART RATE: 100 BPM | RESPIRATION RATE: 20 BRPM | TEMPERATURE: 97.5 F | SYSTOLIC BLOOD PRESSURE: 114 MMHG | OXYGEN SATURATION: 95 % | DIASTOLIC BLOOD PRESSURE: 75 MMHG

## 2018-07-28 LAB
ANION GAP SERPL CALC-SCNC: 6 MMOL/L (ref 3–11)
ANISOCYTOSIS PRESENCE IN BLOOD, ANALYZER: ABNORMAL
BASOPHILS # BLD AUTO: 0 10*3/UL
BASOPHILS NFR BLD AUTO: 0 % (ref 0–2)
BUN SERPL-MCNC: 21 MG/DL (ref 7–25)
CALCIUM SERPL-MCNC: 9.3 MG/DL (ref 8.6–10.3)
CHLORIDE SERPL-SCNC: 105 MMOL/L (ref 98–107)
CO2 SERPL-SCNC: 33 MMOL/L (ref 21–32)
CREAT SERPL-MCNC: 0.6 MG/DL (ref 0.6–1.2)
EOSINOPHIL # BLD AUTO: 0 10*3/UL
EOSINOPHIL NFR BLD AUTO: 0 % (ref 0–3)
ERYTHROCYTE [DISTWIDTH] IN BLOOD BY AUTOMATED COUNT: 19 % (ref 11.5–14)
GFR SERPL CREATININE-BSD FRML MDRD: 103 ML/MIN/1.73M*2
GLUCOSE SERPL-MCNC: 113 MG/DL (ref 70–105)
HCT VFR BLD AUTO: 34.8 % (ref 34–45)
HGB BLD-MCNC: 11.3 G/DL (ref 11.5–15.5)
HYPOCHROMIA PRESENCE IN BLOOD, ANALYZER: ABNORMAL
LYMPHOCYTES # BLD AUTO: 2.6 10*3/UL
LYMPHOCYTES NFR BLD AUTO: 28 % (ref 11–47)
MCH RBC QN AUTO: 25.9 PG (ref 28–33)
MCHC RBC AUTO-ENTMCNC: 32.5 G/DL (ref 32–36)
MCV RBC AUTO: 79.5 FL (ref 81–97)
MICROCYTOSIS PRESENCE IN BLOOD, ANALYZER: ABNORMAL
MONOCYTES # BLD AUTO: 0.9 10*3/UL
MONOCYTES NFR BLD AUTO: 10 % (ref 3–11)
NEUTROPHILS # BLD AUTO: 5.7 10*3/UL
NEUTROPHILS NFR BLD AUTO: 62 % (ref 41–81)
PLATELET # BLD AUTO: 273 10*3/UL (ref 140–350)
PMV BLD AUTO: 6.7 FL (ref 6.9–10.8)
POTASSIUM SERPL-SCNC: 3.7 MMOL/L (ref 3.5–5.1)
RBC # BLD AUTO: 4.38 10*6/ΜL (ref 3.7–5.3)
SODIUM SERPL-SCNC: 144 MMOL/L (ref 135–145)
THEOPHYLLINE SERPL-MCNC: 3.9 UG/ML (ref 10–20)
WBC # BLD AUTO: 9.3 10*3/UL (ref 4.5–10.5)

## 2018-07-28 PROCEDURE — 6360000200 HC RX 636 W HCPCS (ALT 250 FOR IP): Performed by: STUDENT IN AN ORGANIZED HEALTH CARE EDUCATION/TRAINING PROGRAM

## 2018-07-28 PROCEDURE — 6370000100 HC RX 637 (ALT 250 FOR IP): Performed by: FAMILY MEDICINE

## 2018-07-28 PROCEDURE — 6370000100 HC RX 637 (ALT 250 FOR IP): Performed by: INTERNAL MEDICINE

## 2018-07-28 PROCEDURE — 94640 AIRWAY INHALATION TREATMENT: CPT

## 2018-07-28 PROCEDURE — 2580000300 HC RX 258: Performed by: STUDENT IN AN ORGANIZED HEALTH CARE EDUCATION/TRAINING PROGRAM

## 2018-07-28 PROCEDURE — 94761 N-INVAS EAR/PLS OXIMETRY MLT: CPT

## 2018-07-28 PROCEDURE — 36415 COLL VENOUS BLD VENIPUNCTURE: CPT | Performed by: FAMILY MEDICINE

## 2018-07-28 PROCEDURE — 80198 ASSAY OF THEOPHYLLINE: CPT | Performed by: FAMILY MEDICINE

## 2018-07-28 PROCEDURE — 80048 BASIC METABOLIC PNL TOTAL CA: CPT | Performed by: FAMILY MEDICINE

## 2018-07-28 PROCEDURE — 6360000200 HC RX 636 W HCPCS (ALT 250 FOR IP): Performed by: INTERNAL MEDICINE

## 2018-07-28 PROCEDURE — 2580000300 HC RX 258: Performed by: NURSE PRACTITIONER

## 2018-07-28 PROCEDURE — 6370000100 HC RX 637 (ALT 250 FOR IP): Performed by: STUDENT IN AN ORGANIZED HEALTH CARE EDUCATION/TRAINING PROGRAM

## 2018-07-28 PROCEDURE — 85025 COMPLETE CBC W/AUTO DIFF WBC: CPT | Performed by: FAMILY MEDICINE

## 2018-07-28 RX ORDER — SODIUM CHLORIDE 9 MG/ML
500 INJECTION, SOLUTION INTRAVENOUS ONCE
Status: COMPLETED | OUTPATIENT
Start: 2018-07-28 | End: 2018-07-28

## 2018-07-28 RX ORDER — AMOXICILLIN AND CLAVULANATE POTASSIUM 875; 125 MG/1; MG/1
1 TABLET, FILM COATED ORAL 2 TIMES DAILY WITH MEALS
Qty: 10 TABLET | Refills: 0 | Status: SHIPPED | OUTPATIENT
Start: 2018-07-28 | End: 2018-08-02

## 2018-07-28 RX ORDER — PREDNISONE 10 MG/1
TABLET ORAL
Qty: 100 TABLET | Refills: 0 | Status: SHIPPED | OUTPATIENT
Start: 2018-07-28 | End: 2021-04-02 | Stop reason: ALTCHOICE

## 2018-07-28 RX ADMIN — LORATADINE 10 MG: 10 TABLET ORAL at 09:09

## 2018-07-28 RX ADMIN — THEOPHYLLINE ANHYDROUS 400 MG: 200 CAPSULE, EXTENDED RELEASE ORAL at 09:09

## 2018-07-28 RX ADMIN — LEVALBUTEROL 1.25 MG: 1.25 SOLUTION, CONCENTRATE RESPIRATORY (INHALATION) at 13:54

## 2018-07-28 RX ADMIN — IPRATROPIUM BROMIDE 0.5 MG: 0.5 SOLUTION RESPIRATORY (INHALATION) at 07:16

## 2018-07-28 RX ADMIN — OXYCODONE HYDROCHLORIDE AND ACETAMINOPHEN 500 MG: 500 TABLET ORAL at 09:09

## 2018-07-28 RX ADMIN — LEVALBUTEROL 1.25 MG: 1.25 SOLUTION, CONCENTRATE RESPIRATORY (INHALATION) at 07:16

## 2018-07-28 RX ADMIN — AMOXICILLIN AND CLAVULANATE POTASSIUM 1 TABLET: 875; 125 TABLET, FILM COATED ORAL at 09:09

## 2018-07-28 RX ADMIN — AZITHROMYCIN MONOHYDRATE 500 MG: 500 INJECTION, POWDER, LYOPHILIZED, FOR SOLUTION INTRAVENOUS at 05:00

## 2018-07-28 RX ADMIN — ENOXAPARIN SODIUM 40 MG: 100 INJECTION SUBCUTANEOUS at 13:21

## 2018-07-28 RX ADMIN — ROFLUMILAST 500 MCG: 500 TABLET ORAL at 12:00

## 2018-07-28 RX ADMIN — ACETAMINOPHEN 650 MG: 325 TABLET ORAL at 13:20

## 2018-07-28 RX ADMIN — ACETAMINOPHEN 650 MG: 325 TABLET ORAL at 09:09

## 2018-07-28 RX ADMIN — METOPROLOL TARTRATE 25 MG: 25 TABLET ORAL at 09:10

## 2018-07-28 RX ADMIN — GUAIFENESIN 600 MG: 600 TABLET, EXTENDED RELEASE ORAL at 09:09

## 2018-07-28 RX ADMIN — IPRATROPIUM BROMIDE 0.5 MG: 0.5 SOLUTION RESPIRATORY (INHALATION) at 13:54

## 2018-07-28 RX ADMIN — PREDNISONE 20 MG: 20 TABLET ORAL at 09:10

## 2018-07-28 RX ADMIN — SODIUM CHLORIDE 500 ML: 9 INJECTION, SOLUTION INTRAVENOUS at 02:16

## 2018-07-28 NOTE — INTERDISCIPLINARY/THERAPY
07/28/18 0943   O2 Discharge Determination   Activity Exercise   $ Multiple SpO2 Levels Obtained? Yes   O2 Discharge Determination Activity: Resting   Resting Oxygen Delivery Interface Nasal cannula   Resting Oxygen Requirement (lpm) 3 lpm   Resting SPO2 (%) on Final Need of Oxygen 95 %   Resting Comment Dr. Dias does not want O2 weaned below 3L.    O2 Discharge Determination Activity: Exercise   Exercise Oxygen Delivery Interface Nasal cannula   Exercise Oxygen Requirement (lpm) 5 lpm   Exercise SPO2 (%) on Final Need of Oxygen 92 %   Exercise SPO2 (%) on O2 at 4lpm 87 %   Pt ambulated around pod without incident. Will need 3L O2 with rest and 5L O2 with activity.

## 2018-07-28 NOTE — NURSING NOTE
Patient received supine with eyes open during rounds. Assumed care of patient and oriented to present nurse. Alert and orient. Resp even and regular. Skin warm and dry to touch. Denies any pain at this time. Further assessments noted in chart.

## 2018-07-28 NOTE — INTERDISCIPLINARY/THERAPY
Case Management:  [phone]    Updated 02 requirements faxed to 02 [facility] in [place].

## 2018-07-28 NOTE — NURSING END OF SHIFT
Nursing End of Shift Summary    Goals:  Clinical Goals for the Shift: stable respiration, rest    Narrative Summary of Progress Towards Clinical Goals: patient  patient presents stable respiration at rest, but unable to tolerate ambulation ADLs     Barriers for Transfer or Discharge: yes  Unstable respiration

## 2018-07-28 NOTE — NURSING END OF SHIFT
Nursing End of Shift Summary    Goals:  Clinical Goals for the Shift: Stable heart rate & respirations    Narrative Summary of Progress Towards Clinical Goals:  Heart rate maintained at 70-75 bpm this shift.     Barriers for Transfer or Discharge: no

## 2018-08-07 ENCOUNTER — TELEPHONE (OUTPATIENT)
Dept: PULMONOLOGY | Facility: CLINIC | Age: 58
End: 2018-08-07

## 2018-11-14 ENCOUNTER — TELEPHONE (OUTPATIENT)
Dept: PULMONOLOGY | Facility: CLINIC | Age: 58
End: 2018-11-14

## 2018-11-15 DIAGNOSIS — J44.1 CHRONIC OBSTRUCTIVE PULMONARY DISEASE WITH ACUTE EXACERBATION (CMS/HCC): ICD-10-CM

## 2018-11-15 RX ORDER — DOXYCYCLINE HYCLATE 100 MG
100 TABLET ORAL 2 TIMES DAILY
Qty: 20 TABLET | Refills: 0 | Status: SHIPPED | OUTPATIENT
Start: 2018-11-15 | End: 2018-11-16 | Stop reason: SDUPTHER

## 2018-11-16 DIAGNOSIS — J96.22 ACUTE ON CHRONIC RESPIRATORY FAILURE WITH HYPOXIA AND HYPERCAPNIA (CMS/HCC): ICD-10-CM

## 2018-11-16 DIAGNOSIS — J44.1 CHRONIC OBSTRUCTIVE PULMONARY DISEASE WITH ACUTE EXACERBATION (CMS/HCC): ICD-10-CM

## 2018-11-16 DIAGNOSIS — J96.21 ACUTE ON CHRONIC RESPIRATORY FAILURE WITH HYPOXIA AND HYPERCAPNIA (CMS/HCC): ICD-10-CM

## 2018-11-16 RX ORDER — DOXYCYCLINE HYCLATE 100 MG
100 TABLET ORAL 2 TIMES DAILY
Qty: 20 TABLET | Refills: 0 | Status: SHIPPED | OUTPATIENT
Start: 2018-11-16 | End: 2018-11-26

## 2018-11-16 RX ORDER — PREDNISONE 10 MG/1
TABLET ORAL
Qty: 35 TABLET | Refills: 0 | Status: SHIPPED | OUTPATIENT
Start: 2018-11-16 | End: 2018-12-16

## 2019-01-03 ENCOUNTER — TELEPHONE (OUTPATIENT)
Dept: PULMONOLOGY | Facility: CLINIC | Age: 59
End: 2019-01-03

## 2019-01-04 NOTE — TELEPHONE ENCOUNTER
Rafia called and left message that she is really sick and running a fever, hurts every where. Asking if could have prednisone. States may be from her kidneys. Per Dr Baker, she needs to be seen locally by pcp or ER. Pt states thinks kidneys, has seen nephrology in past and maybe needs to see them again. Her PCP has been out with a surgery and she doesn't want to use the ER. States she made an appt for the 16th with Dr Baker. Encouraged strongly to go to PCP or ER locally to be seen, get authorization to see nephrology again and keep appt on 16th. Pt agrees with plan.

## 2019-07-19 ENCOUNTER — TELEPHONE (OUTPATIENT)
Dept: PULMONOLOGY | Facility: CLINIC | Age: 59
End: 2019-07-19

## 2019-07-19 NOTE — TELEPHONE ENCOUNTER
Patient calls, states she needs to get an appointment before August. States her family is trying to get her in to see someone in [place] for stem cell treatment.   Informed patient that no appointment available until September.   Patient has missed numerous previous appointment.   Patient states she would like to speak with Dr Baker as he has been involved in her care for many years.   Informed her that Dr Baker is out until 7/29/19. Verbalizes understanding.   Appointment was made for patient in September. She would like a call if we have any cancellations.

## 2019-09-05 ENCOUNTER — OFFICE VISIT (OUTPATIENT)
Dept: PULMONOLOGY | Facility: CLINIC | Age: 59
End: 2019-09-05
Payer: COMMERCIAL

## 2019-09-05 VITALS
HEIGHT: 63 IN | BODY MASS INDEX: 30.12 KG/M2 | HEART RATE: 111 BPM | WEIGHT: 170 LBS | DIASTOLIC BLOOD PRESSURE: 80 MMHG | SYSTOLIC BLOOD PRESSURE: 110 MMHG | OXYGEN SATURATION: 95 %

## 2019-09-05 DIAGNOSIS — J44.9 CHRONIC OBSTRUCTIVE PULMONARY DISEASE, UNSPECIFIED COPD TYPE (CMS/HCC): Primary | ICD-10-CM

## 2019-09-05 PROCEDURE — 99214 OFFICE O/P EST MOD 30 MIN: CPT | Performed by: INTERNAL MEDICINE

## 2019-09-05 RX ORDER — AZITHROMYCIN 250 MG/1
TABLET, FILM COATED ORAL
Qty: 6 TABLET | Refills: 0 | Status: SHIPPED | OUTPATIENT
Start: 2019-09-05 | End: 2019-09-10

## 2019-09-05 ASSESSMENT — ENCOUNTER SYMPTOMS
FATIGUE: 1
ENDOCRINE NEGATIVE: 1
ARTHRALGIAS: 1
RHINORRHEA: 1
ADENOPATHY: 0
COUGH: 1
PSYCHIATRIC NEGATIVE: 1
DIAPHORESIS: 0
CHILLS: 0
BRUISES/BLEEDS EASILY: 0
WHEEZING: 1
UNEXPECTED WEIGHT CHANGE: 0
FEVER: 0
EYES NEGATIVE: 1
GASTROINTESTINAL NEGATIVE: 1
SHORTNESS OF BREATH: 1
LIGHT-HEADEDNESS: 0
DYSURIA: 1
SORE THROAT: 0

## 2019-09-05 ASSESSMENT — PAIN SCALES - GENERAL: PAINLEVEL: 8

## 2019-09-05 NOTE — LETTER
[facility]  [address] [place] [id number]  [phone]  Dept: [phone]  September 5, 2019     Denita Spring   [address]  [place] [address]      Patient: Denita Spring   YOB: 1960   Date of Visit: 9/5/2019       To Whom it May Concern:    Denita Spring was seen in my clinic on  9/5/2019 at [facility]. She has very severe COPD and requires continuous oxygen.    If you have any questions or concerns, please don't hesitate to call.         Sincerely,         JANICE RAPHAEL MD        CC: No Recipients

## 2019-09-05 NOTE — PATIENT INSTRUCTIONS
Stop spiriva  Yupelri 1 dose daily(in the morning) by nebulizer machine.  Call if it works better than spiriva and we will send in script.  Continue your current Oxygen and other medications and inhalers.  Talk to your primary care about arthritis and kidney infection.  Take 5 day z pack

## 2019-09-05 NOTE — LETTER
MEDICAL [facility]INIC PULMONOLOGY  [address] [facility] [place] [address]  [phone]  Dept: [phone]  September 5, 2019     [address] [place] [address]    Patient: Denita Spring   YOB: 1960   Date of Visit: 9/5/2019       To:  [facility]    Our mutual patient, Denita Spring, was seen in my office on 9/5/2019. Below are my notes.     JANICE RAPHAEL MD  9/5/2019  7:24 PM  Signed  Subjective   Denita Spring is a 59 y.o. female   Patient is in for follow-up of her end-stage COPD.  She was last admitted for an exacerbation in July 2018.  She has done reasonably well since then.  He has had lung transplant assessment and was not felt to be a candidate.    She now though is starting to cough up some greenish sputum.  She denies fevers chills or sweats.  She is dyspneic with minimal activity.  She remains on oxygen at 4 to 5 L/min.  She remains on albuterol by nebulizer 2-3 times a day, albuterol by metered-dose inhaler as needed, Advair, Spiriva, Daliresp, Mucinex, and montelukast.  She has been weaned off prednisone.    She has complaints of right knee pain and recurrent bladder infections.    She is looking into getting stem cell transplant but cannot get funding.  Current Medications:  Current Outpatient Medications   Medication Sig Dispense Refill   • calcium carbonate EX (TUMS E-X) 300 mg (750 mg) chewable tablet Take 1,500 mg by mouth 4 (four) times a day as needed for indigestion or heartburn.     • methyl salicylate-menthol 15-10 % cream See administration instructions. Apply a sufficient quantity to the affected area as directed for muscle or joint pain. **wash hands after use**     • ipratropium-albuterol (COMBIVENT RESPIMAT)  mcg/actuation inhaler Inhale 1 puff 4 (four) times a day.     • albuterol HFA (PROAIR HFA) 90 mcg/actuation inhaler Inhale 2 puffs every 4 (four) hours as needed for wheezing.     • sodium chloride (OCEAN) 0.65 % nasal  spray Administer 1 spray into each nostril 4 (four) times a day.     • ferrous sulfate 325 mg (65 mg iron) tablet Take 65 mg by mouth 2 (two) times a day.     • omega-3 fatty acids-fish oil (FISH OIL) 340-1,000 mg capsule Take 1,000 mg by mouth daily.     • guaiFENesin (MUCINEX) 600 mg 12 hr tablet Take 600 mg by mouth 3 (three) times a day as needed for cough.     • metFORMIN (GLUCOPHAGE) 500 mg tablet Take 500 mg by mouth 2 (two) times a day with meals.     • nystatin (MYCOSTATIN) cream Apply 1 application topically 3 (three) times a day.     • simethicone (MYLICON) 80 mg chewable tablet Take 80 mg by mouth 4 (four) times a day as needed for flatulence.     • cetirizine (ZyrTEC) 10 mg tablet Take 10 mg by mouth daily.     • acetaminophen (TYLENOL) 325 mg tablet Take 650 mg by mouth 4 (four) times a day as needed.     • hydrocortisone 1 % cream Apply 1 application topically 2 (two) times a day as needed.     • nystatin (MYCOSTATIN) 100,000 unit/mL suspension Take 4 mL (400,000 Units total) by mouth 4 (four) times a day. 300 mL 0   • albuterol 2.5 mg /3 mL nebulizer solution Take 3 mL (2.5 mg total) by nebulization every 6 (six) hours as needed for wheezing. 75 mL 1   • theophylline (THEODUR) 200 mg 12 hr tablet Take 200 mg by mouth 2 (two) times a day.       • roflumilast (DALIRESP) 500 mcg tablet Take 500 mcg by mouth daily with lunch.     • ergocalciferol (VITAMIN D2) 50,000 unit capsule Take 50,000 Units by mouth once a week. Take on Thursday.      • tiotropium (SPIRIVA WITH HANDIHALER) 1 capsule = per inhalation capsule Place 1 capsule into inhaler and inhale every morning.     • fluticasone (FLONASE) 50 mcg/actuation nasal spray Administer 1 spray into each nostril 2 (two) times a day. Not to exceed 2 sprays in each nostril daily.      • alendronate (FOSAMAX) 70 mg tablet Take 70 mg by mouth every 7 (seven) days. Take on Thursday.      • omeprazole (PriLOSEC) 20 mg capsule Take 40 mg by mouth daily.       •  montelukast (SINGULAIR) 10 mg tablet Take 10 mg by mouth nightly.       • ascorbic acid, vitamin C, (VITAMIN C) 500 mg tablet Take 1,000 mg by mouth daily.       • docusate sodium (COLACE) 100 mg capsule Take 100 mg by mouth 2 (two) times a day as needed. Takes after lunch and at bedtime      • fluticasone-salmeterol (ADVAIR DISKUS) 500-50 mcg/dose diskus inhaler Inhale 1 puff 2 (two) times a day.       • azithromycin (ZITHROMAX) 250 mg tablet Take 2 by mouth today then 1 daily for 4 days 6 tablet 0   • revefenacin (Yupelri) 175 mcg/3 mL solution for nebulization Inhale 175 mcg daily 28 vial 0   • predniSONE (DELTASONE) 10 mg tablet 4 tabs in the AM, 2 tabs PM for 5 days, then 4tab in the AM, 1 tab PM for 5 days, then 4 tab in the AM for 5 days, then 3 tab in AM then dis (Patient not taking: Reported on 9/5/2019 ) 100 tablet 0   • budesonide (PULMICORT) 0.5 mg/2 mL nebulizer solution Take 1 mg by nebulization 2 (two) times a day. Rinse mouth with water after use. Do not swallow.     • ranitidine (ZANTAC) 150 mg tablet Take 150 mg by mouth nightly.     • ibuprofen (ADVIL,MOTRIN) 800 mg tablet Take 800 mg by mouth every 8 (eight) hours as needed for pain scale 1-3/10.     • ALPRAZolam (XANAX) 0.25 mg tablet Take 0.25 mg by mouth nightly as needed for anxiety.     • metoprolol tartrate (LOPRESSOR) 25 mg tablet Take 1 tablet (25 mg total) by mouth 2 (two) times a day. (Patient not taking: Reported on 9/5/2019 ) 60 tablet 0   • diltiazem CD (CARDIZEM CD) 240 mg 24 hr capsule Take 1 capsule (240 mg total) by mouth daily. (Patient not taking: Reported on 9/5/2019 ) 30 capsule 0   • multivitamin (THERAGRAN) tablet tablet Take 1 tablet by mouth daily with lunch.         No current facility-administered medications for this visit.        Review of Systems   Constitutional: Positive for fatigue. Negative for chills, diaphoresis, fever and unexpected weight change.   HENT: Positive for postnasal drip and rhinorrhea. Negative  "for sore throat (recent strep throat).    Eyes: Negative.    Respiratory: Positive for cough, shortness of breath and wheezing.    Cardiovascular: Negative for leg swelling.   Gastrointestinal: Negative.    Endocrine: Negative.    Genitourinary: Positive for dysuria.   Musculoskeletal: Positive for arthralgias (right knee and hip arthralgias).   Allergic/Immunologic: Positive for environmental allergies.   Neurological: Negative for syncope and light-headedness.   Hematological: Negative for adenopathy. Does not bruise/bleed easily.   Psychiatric/Behavioral: Negative.        Objective     /80 (BP Location: Left arm, Patient Position: Sitting, Cuff Size: Reg)   Pulse 111   Ht 1.6 m (5' 3\")   Wt 77.1 kg (170 lb)   SpO2 95%   BMI 30.11 kg/m²      Physical Exam  Constitutional:       General: She is not in acute distress.     Appearance: Normal appearance. She is well-developed. She is not ill-appearing or diaphoretic.   HENT:      Head: Normocephalic.      Right Ear: Tympanic membrane, ear canal and external ear normal.      Left Ear: Tympanic membrane, ear canal and external ear normal.      Nose: Nose normal.      Mouth/Throat:      Mouth: Mucous membranes are moist.      Pharynx: Oropharynx is clear. No oropharyngeal exudate or posterior oropharyngeal erythema.   Eyes:      General: No scleral icterus.     Extraocular Movements: Extraocular movements intact.      Conjunctiva/sclera: Conjunctivae normal.      Pupils: Pupils are equal, round, and reactive to light.   Neck:      Thyroid: No thyromegaly.      Vascular: No JVD.      Trachea: No tracheal deviation.   Cardiovascular:      Rate and Rhythm: Normal rate and regular rhythm.      Heart sounds: No murmur. No gallop.    Pulmonary:      Effort: Pulmonary effort is normal. No respiratory distress.      Breath sounds: No stridor. Rhonchi present. No wheezing or rales.      Comments: Chest Resonant to percussion.  Breath sounds are severely decreased with " a few faint expiratory rhonchi.  There is no accessory muscle use.  The patient is in no acute distress.  She is wearing nasal cannula oxygen.  Musculoskeletal:         General: No deformity.      Right lower leg: No edema.      Left lower leg: No edema.   Lymphadenopathy:      Cervical: No cervical adenopathy.   Skin:     General: Skin is warm and dry.      Coloration: Skin is not jaundiced.      Findings: No rash.   Neurological:      General: No focal deficit present.      Mental Status: She is alert and oriented to person, place, and time.   Psychiatric:         Mood and Affect: Mood normal.         Behavior: Behavior normal.         Assessment/Plan   1.  End-stage COPD  2.  Hypoxemia  3.  Bronchitis  4.  Recurrent UTIs  5.  Knee pain  Discussion;  Patient has extremely advanced COPD but has been reasonably stable in the last year with no admissions to the hospital.  She is having a little bit of bronchitis right now.  I will give her a 5-day course of azithromycin which she does tolerate.  I will give her a trial of yupelri to replace the Spiriva.  If she feels it works better she should contact us and we will get a prescription sent in.    She should continue her other medical management and oxygen.  She should talk with her primary care provider about her knee pain and UTIs.  I will plan to follow-up with her in 4 months or sooner if needed.  She needs a flu shot this fall.    Pt voices understanding and agreement to plan as stated above. All questions answered.         A voice recognition program was used to aid in medical record documentation. Sometimes words are printed not exactly as they were spoken. While efforts were made to carefully edit and correct any inaccuracies, some errors may be present. Errors should be taken within the context of the discussion.  Please contact our office if you need assistance interpreting this medical record or notice any mistakes.                  If you have questions,  please do not hesitate to call me. I look forward to following your patient along with you.         Sincerely,        JANICE RAPHAEL MD        CC: No Recipients

## 2019-09-05 NOTE — PROGRESS NOTES
Subjective   Denita Spring is a 59 y.o. female   Patient is in for follow-up of her end-stage COPD.  She was last admitted for an exacerbation in July 2018.  She has done reasonably well since then.  He has had lung transplant assessment and was not felt to be a candidate.    She now though is starting to cough up some greenish sputum.  She denies fevers chills or sweats.  She is dyspneic with minimal activity.  She remains on oxygen at 4 to 5 L/min.  She remains on albuterol by nebulizer 2-3 times a day, albuterol by metered-dose inhaler as needed, Advair, Spiriva, Daliresp, Mucinex, and montelukast.  She has been weaned off prednisone.    She has complaints of right knee pain and recurrent bladder infections.    She is looking into getting stem cell transplant but cannot get funding.  Current Medications:  Current Outpatient Medications   Medication Sig Dispense Refill   • calcium carbonate EX (TUMS E-X) 300 mg (750 mg) chewable tablet Take 1,500 mg by mouth 4 (four) times a day as needed for indigestion or heartburn.     • methyl salicylate-menthol 15-10 % cream See administration instructions. Apply a sufficient quantity to the affected area as directed for muscle or joint pain. **wash hands after use**     • ipratropium-albuterol (COMBIVENT RESPIMAT)  mcg/actuation inhaler Inhale 1 puff 4 (four) times a day.     • albuterol HFA (PROAIR HFA) 90 mcg/actuation inhaler Inhale 2 puffs every 4 (four) hours as needed for wheezing.     • sodium chloride (OCEAN) 0.65 % nasal spray Administer 1 spray into each nostril 4 (four) times a day.     • ferrous sulfate 325 mg (65 mg iron) tablet Take 65 mg by mouth 2 (two) times a day.     • omega-3 fatty acids-fish oil (FISH OIL) 340-1,000 mg capsule Take 1,000 mg by mouth daily.     • guaiFENesin (MUCINEX) 600 mg 12 hr tablet Take 600 mg by mouth 3 (three) times a day as needed for cough.     • metFORMIN (GLUCOPHAGE) 500 mg tablet Take 500 mg by mouth 2 (two) times a  day with meals.     • nystatin (MYCOSTATIN) cream Apply 1 application topically 3 (three) times a day.     • simethicone (MYLICON) 80 mg chewable tablet Take 80 mg by mouth 4 (four) times a day as needed for flatulence.     • cetirizine (ZyrTEC) 10 mg tablet Take 10 mg by mouth daily.     • acetaminophen (TYLENOL) 325 mg tablet Take 650 mg by mouth 4 (four) times a day as needed.     • hydrocortisone 1 % cream Apply 1 application topically 2 (two) times a day as needed.     • nystatin (MYCOSTATIN) 100,000 unit/mL suspension Take 4 mL (400,000 Units total) by mouth 4 (four) times a day. 300 mL 0   • albuterol 2.5 mg /3 mL nebulizer solution Take 3 mL (2.5 mg total) by nebulization every 6 (six) hours as needed for wheezing. 75 mL 1   • theophylline (THEODUR) 200 mg 12 hr tablet Take 200 mg by mouth 2 (two) times a day.       • roflumilast (DALIRESP) 500 mcg tablet Take 500 mcg by mouth daily with lunch.     • ergocalciferol (VITAMIN D2) 50,000 unit capsule Take 50,000 Units by mouth once a week. Take on Thursday.      • tiotropium (SPIRIVA WITH HANDIHALER) 1 capsule = per inhalation capsule Place 1 capsule into inhaler and inhale every morning.     • fluticasone (FLONASE) 50 mcg/actuation nasal spray Administer 1 spray into each nostril 2 (two) times a day. Not to exceed 2 sprays in each nostril daily.      • alendronate (FOSAMAX) 70 mg tablet Take 70 mg by mouth every 7 (seven) days. Take on Thursday.      • omeprazole (PriLOSEC) 20 mg capsule Take 40 mg by mouth daily.       • montelukast (SINGULAIR) 10 mg tablet Take 10 mg by mouth nightly.       • ascorbic acid, vitamin C, (VITAMIN C) 500 mg tablet Take 1,000 mg by mouth daily.       • docusate sodium (COLACE) 100 mg capsule Take 100 mg by mouth 2 (two) times a day as needed. Takes after lunch and at bedtime      • fluticasone-salmeterol (ADVAIR DISKUS) 500-50 mcg/dose diskus inhaler Inhale 1 puff 2 (two) times a day.       • azithromycin (ZITHROMAX) 250 mg  tablet Take 2 by mouth today then 1 daily for 4 days 6 tablet 0   • revefenacin (Yupelri) 175 mcg/3 mL solution for nebulization Inhale 175 mcg daily 28 vial 0   • predniSONE (DELTASONE) 10 mg tablet 4 tabs in the AM, 2 tabs PM for 5 days, then 4tab in the AM, 1 tab PM for 5 days, then 4 tab in the AM for 5 days, then 3 tab in AM then dis (Patient not taking: Reported on 9/5/2019 ) 100 tablet 0   • budesonide (PULMICORT) 0.5 mg/2 mL nebulizer solution Take 1 mg by nebulization 2 (two) times a day. Rinse mouth with water after use. Do not swallow.     • ranitidine (ZANTAC) 150 mg tablet Take 150 mg by mouth nightly.     • ibuprofen (ADVIL,MOTRIN) 800 mg tablet Take 800 mg by mouth every 8 (eight) hours as needed for pain scale 1-3/10.     • ALPRAZolam (XANAX) 0.25 mg tablet Take 0.25 mg by mouth nightly as needed for anxiety.     • metoprolol tartrate (LOPRESSOR) 25 mg tablet Take 1 tablet (25 mg total) by mouth 2 (two) times a day. (Patient not taking: Reported on 9/5/2019 ) 60 tablet 0   • diltiazem CD (CARDIZEM CD) 240 mg 24 hr capsule Take 1 capsule (240 mg total) by mouth daily. (Patient not taking: Reported on 9/5/2019 ) 30 capsule 0   • multivitamin (THERAGRAN) tablet tablet Take 1 tablet by mouth daily with lunch.         No current facility-administered medications for this visit.        Review of Systems   Constitutional: Positive for fatigue. Negative for chills, diaphoresis, fever and unexpected weight change.   HENT: Positive for postnasal drip and rhinorrhea. Negative for sore throat (recent strep throat).    Eyes: Negative.    Respiratory: Positive for cough, shortness of breath and wheezing.    Cardiovascular: Negative for leg swelling.   Gastrointestinal: Negative.    Endocrine: Negative.    Genitourinary: Positive for dysuria.   Musculoskeletal: Positive for arthralgias (right knee and hip arthralgias).   Allergic/Immunologic: Positive for environmental allergies.   Neurological: Negative for  "syncope and light-headedness.   Hematological: Negative for adenopathy. Does not bruise/bleed easily.   Psychiatric/Behavioral: Negative.        Objective     /80 (BP Location: Left arm, Patient Position: Sitting, Cuff Size: Reg)   Pulse 111   Ht 1.6 m (5' 3\")   Wt 77.1 kg (170 lb)   SpO2 95%   BMI 30.11 kg/m²      Physical Exam  Constitutional:       General: She is not in acute distress.     Appearance: Normal appearance. She is well-developed. She is not ill-appearing or diaphoretic.   HENT:      Head: Normocephalic.      Right Ear: Tympanic membrane, ear canal and external ear normal.      Left Ear: Tympanic membrane, ear canal and external ear normal.      Nose: Nose normal.      Mouth/Throat:      Mouth: Mucous membranes are moist.      Pharynx: Oropharynx is clear. No oropharyngeal exudate or posterior oropharyngeal erythema.   Eyes:      General: No scleral icterus.     Extraocular Movements: Extraocular movements intact.      Conjunctiva/sclera: Conjunctivae normal.      Pupils: Pupils are equal, round, and reactive to light.   Neck:      Thyroid: No thyromegaly.      Vascular: No JVD.      Trachea: No tracheal deviation.   Cardiovascular:      Rate and Rhythm: Normal rate and regular rhythm.      Heart sounds: No murmur. No gallop.    Pulmonary:      Effort: Pulmonary effort is normal. No respiratory distress.      Breath sounds: No stridor. Rhonchi present. No wheezing or rales.      Comments: Chest Resonant to percussion.  Breath sounds are severely decreased with a few faint expiratory rhonchi.  There is no accessory muscle use.  The patient is in no acute distress.  She is wearing nasal cannula oxygen.  Musculoskeletal:         General: No deformity.      Right lower leg: No edema.      Left lower leg: No edema.   Lymphadenopathy:      Cervical: No cervical adenopathy.   Skin:     General: Skin is warm and dry.      Coloration: Skin is not jaundiced.      Findings: No rash.   Neurological: "      General: No focal deficit present.      Mental Status: She is alert and oriented to person, place, and time.   Psychiatric:         Mood and Affect: Mood normal.         Behavior: Behavior normal.         Assessment/Plan   1.  End-stage COPD  2.  Hypoxemia  3.  Bronchitis  4.  Recurrent UTIs  5.  Knee pain  Discussion;  Patient has extremely advanced COPD but has been reasonably stable in the last year with no admissions to the hospital.  She is having a little bit of bronchitis right now.  I will give her a 5-day course of azithromycin which she does tolerate.  I will give her a trial of yupelri to replace the Spiriva.  If she feels it works better she should contact us and we will get a prescription sent in.    She should continue her other medical management and oxygen.  She should talk with her primary care provider about her knee pain and UTIs.  I will plan to follow-up with her in 4 months or sooner if needed.  She needs a flu shot this fall.    Pt voices understanding and agreement to plan as stated above. All questions answered.         A voice recognition program was used to aid in medical record documentation. Sometimes words are printed not exactly as they were spoken. While efforts were made to carefully edit and correct any inaccuracies, some errors may be present. Errors should be taken within the context of the discussion.  Please contact our office if you need assistance interpreting this medical record or notice any mistakes.

## 2020-02-25 NOTE — PLAN OF CARE
Problem: Respiratory - Adult  Goal: Achieves optimal ventilation and oxygenation  INTERVENTIONS:  1. Assess for changes in respiratory status  2. Assess for changes in mentation and behavior  3. Position to facilitate oxygenation and minimize respiratory effort  4. Oxygen supplementation based on oxygen saturation or ABGs  5. Assess patient's ability to cough effectively  6. Encourage broncho-pulmonary hygiene including cough, deep breathe  7. Assess the need for suctioning   8. Assess and instruct to report SOB or any respiratory difficulty  9. Respiratory Therapy support as indicated, including medications and treatment.   Outcome: Progressing   02/14/18 2016   Interventions Appropriate for this Patient   Achieves optimal ventilation and oxygenation Assess for changes in respiratory status;Assess for changes in mentation and behavior;Position to facilitate oxygenation and minimize respiratory effort;Oxygen supplementation based on oxygen saturation or arterial blood gases;Assess patient's ability to cough effectively;Encourage broncho-pulmonary hygiene including cough, deep breathe;Assess the need for suctioning;Assess and instruct to report shortness of breath or any respiratory difficulty;Respiratory Therapy support as indicated, including medications and treatment          details…

## 2020-06-02 ENCOUNTER — TELEMEDICINE (OUTPATIENT)
Dept: PULMONOLOGY | Facility: CLINIC | Age: 60
End: 2020-06-02
Payer: COMMERCIAL

## 2020-06-02 DIAGNOSIS — J30.9 ALLERGIC RHINITIS, UNSPECIFIED SEASONALITY, UNSPECIFIED TRIGGER: Primary | ICD-10-CM

## 2020-06-02 DIAGNOSIS — J44.9 CHRONIC OBSTRUCTIVE PULMONARY DISEASE, UNSPECIFIED COPD TYPE (CMS/HCC): ICD-10-CM

## 2020-06-02 DIAGNOSIS — R09.02 HYPOXEMIA: ICD-10-CM

## 2020-06-02 PROCEDURE — 98968 PH1 ASSMT&MGMT NQHP 21-30: CPT | Performed by: INTERNAL MEDICINE

## 2020-06-02 RX ORDER — MINERAL OIL
180 ENEMA (ML) RECTAL DAILY
Qty: 30 TABLET | Refills: 11 | Status: SHIPPED | OUTPATIENT
Start: 2020-06-02 | End: 2021-06-02

## 2020-06-02 ASSESSMENT — ENCOUNTER SYMPTOMS
PSYCHIATRIC NEGATIVE: 1
DYSURIA: 1
GASTROINTESTINAL NEGATIVE: 1
CHILLS: 0
EYES NEGATIVE: 1
FATIGUE: 1
FEVER: 0
ADENOPATHY: 0
LIGHT-HEADEDNESS: 0
RHINORRHEA: 1
COUGH: 1
UNEXPECTED WEIGHT CHANGE: 0
WHEEZING: 1
ENDOCRINE NEGATIVE: 1
ARTHRALGIAS: 1
DIAPHORESIS: 0
SHORTNESS OF BREATH: 1
BRUISES/BLEEDS EASILY: 0
SORE THROAT: 0

## 2020-06-02 NOTE — PROGRESS NOTES
Subjective   Per discussion with JANICE RAPHAEL MD, Dneita, has verbally consented to be treated via telemedicine (video) visit: Yes.  Patient Location: Home  Provider Location: Clinic  Technology used by Provider: Phone    HPI  Denita Spring is a 60 y.o. female who presents for follow-up of severe COPD.    HPI  Telephone telemedicine visit was performed.  The patient was not able to do video visit.  She has severe COPD.  She is on oxygen at 3 to 5 L/min, theophylline, Daliresp Spiriva, and Advair.  She uses albuterol nebs once or twice a day.    Presently she is having little difficulty with nasal congestion eye itching that she attributes to allergies.  She would like me to send in a prescription for Allegra which she normally takes but has ran out.  She feels her breathing is pretty stable.    She is continuing to have knee pain and has not been able to bear weight for several months.  She apparently saw orthopedic surgery and the joint was imaged.  I do not have any other notes.  Further evaluation and treatment got stopped due to COVID-19 pandemic.    She has not required hospitalization for almost 2 years.    She denies any recent use of prednisone or antibiotics.  She is not been able to walk due to knee pain.    She is spending most of her time at home.  She denies recent colds or flu.  Weight is been stable.  She has had no fever chills or sweats.      The following have been reviewed and updated as appropriate in this visit:    Allergies   Allergen Reactions   • Diphenhydramine      DENIES   • Cefuroxime      SUNBURN   • Cefuroxime Axetil    • Diphenhydramine Hcl    • Erythromycin Lactobionate    • Methylphenidate      ON FIRE   • Metoclopramide      dizzy, sweating   • Propoxyphene    • Propoxyphene N-Acetaminophen    • Tramadol      PARALYSIS   • Codeine      oxygen gets really low   • Erythromycin Base      GI UPSET   • Ibuprofen      DENIES   • Latex      DRY MOUTH     Current Outpatient Medications    Medication Sig Dispense Refill   • fexofenadine (ALLEGRA) 180 mg tablet Take 1 tablet (180 mg total) by mouth daily 30 tablet 11   • calcium carbonate EX (TUMS E-X) 300 mg (750 mg) chewable tablet Take 1,500 mg by mouth 4 (four) times a day as needed for indigestion or heartburn.     • methyl salicylate-menthol 15-10 % cream See administration instructions. Apply a sufficient quantity to the affected area as directed for muscle or joint pain. **wash hands after use**     • budesonide (PULMICORT) 0.5 mg/2 mL nebulizer solution Take 1 mg by nebulization 2 (two) times a day. Rinse mouth with water after use. Do not swallow.     • ipratropium-albuterol (COMBIVENT RESPIMAT)  mcg/actuation inhaler Inhale 1 puff 4 (four) times a day.     • albuterol HFA (PROAIR HFA) 90 mcg/actuation inhaler Inhale 2 puffs every 4 (four) hours as needed for wheezing.     • sodium chloride (OCEAN) 0.65 % nasal spray Administer 1 spray into each nostril 4 (four) times a day.     • ferrous sulfate 325 mg (65 mg iron) tablet Take 65 mg by mouth 2 (two) times a day.     • guaiFENesin (MUCINEX) 600 mg 12 hr tablet Take 600 mg by mouth 3 (three) times a day as needed for cough.     • metFORMIN (GLUCOPHAGE) 500 mg tablet Take 500 mg by mouth 2 (two) times a day with meals.     • nystatin (MYCOSTATIN) cream Apply 1 application topically 3 (three) times a day.     • simethicone (MYLICON) 80 mg chewable tablet Take 80 mg by mouth 4 (four) times a day as needed for flatulence.     • ranitidine (ZANTAC) 150 mg tablet Take 150 mg by mouth nightly.     • acetaminophen (TYLENOL) 325 mg tablet Take 650 mg by mouth 4 (four) times a day as needed.     • hydrocortisone 1 % cream Apply 1 application topically 2 (two) times a day as needed.     • ALPRAZolam (XANAX) 0.25 mg tablet Take 0.25 mg by mouth nightly as needed for anxiety.     • nystatin (MYCOSTATIN) 100,000 unit/mL suspension Take 4 mL (400,000 Units total) by mouth 4 (four) times a day. 300 mL  0   • diltiazem CD (CARDIZEM CD) 240 mg 24 hr capsule Take 1 capsule (240 mg total) by mouth daily. 30 capsule 0   • albuterol 2.5 mg /3 mL nebulizer solution Take 3 mL (2.5 mg total) by nebulization every 6 (six) hours as needed for wheezing. 75 mL 1   • theophylline (THEODUR) 200 mg 12 hr tablet Take 200 mg by mouth 2 (two) times a day.       • roflumilast (DALIRESP) 500 mcg tablet Take 500 mcg by mouth daily with lunch.     • ergocalciferol (VITAMIN D2) 50,000 unit capsule Take 50,000 Units by mouth once a week. Take on Thursday.      • tiotropium (SPIRIVA WITH HANDIHALER) 1 capsule = per inhalation capsule Place 1 capsule into inhaler and inhale every morning.     • fluticasone (FLONASE) 50 mcg/actuation nasal spray Administer 1 spray into each nostril 2 (two) times a day. Not to exceed 2 sprays in each nostril daily.      • alendronate (FOSAMAX) 70 mg tablet Take 70 mg by mouth every 7 (seven) days. Take on Thursday.      • omeprazole (PriLOSEC) 20 mg capsule Take 40 mg by mouth daily.       • montelukast (SINGULAIR) 10 mg tablet Take 10 mg by mouth nightly.       • ascorbic acid, vitamin C, (VITAMIN C) 500 mg tablet Take 1,000 mg by mouth daily.       • docusate sodium (COLACE) 100 mg capsule Take 100 mg by mouth 2 (two) times a day as needed. Takes after lunch and at bedtime      • multivitamin (THERAGRAN) tablet tablet Take 1 tablet by mouth daily with lunch.       • fluticasone-salmeterol (ADVAIR DISKUS) 500-50 mcg/dose diskus inhaler Inhale 1 puff 2 (two) times a day.       • predniSONE (DELTASONE) 10 mg tablet 4 tabs in the AM, 2 tabs PM for 5 days, then 4tab in the AM, 1 tab PM for 5 days, then 4 tab in the AM for 5 days, then 3 tab in AM then dis (Patient not taking: Reported on 9/5/2019 ) 100 tablet 0   • omega-3 fatty acids-fish oil (FISH OIL) 340-1,000 mg capsule Take 1,000 mg by mouth daily.     • cetirizine (ZyrTEC) 10 mg tablet Take 10 mg by mouth daily.     • ibuprofen (ADVIL,MOTRIN) 800 mg  tablet Take 800 mg by mouth every 8 (eight) hours as needed for pain scale 1-3/10.     • metoprolol tartrate (LOPRESSOR) 25 mg tablet Take 1 tablet (25 mg total) by mouth 2 (two) times a day. (Patient not taking: Reported on 2019 ) 60 tablet 0     No current facility-administered medications for this visit.      Past Medical History:   Diagnosis Date   • Arthritis    • Asthma    • Cancer (CMS/HCC) (HCC)     cervical CA   • COPD (chronic obstructive pulmonary disease) (CMS/HCC) (HCC)    • History of transfusion    • Obesity (BMI 30.0-34.9) 2018   • Osteoporosis    • Pneumonia    • Wears dentures      Past Surgical History:   Procedure Laterality Date   • CERVICAL BIOPSY     •  SECTION      x4   • COLONOSCOPY  10/01/2016   • COLONOSCOPY  2004   • COSMETIC SURGERY     • OTHER SURGICAL HISTORY      Cervical ablation for HPV   • OTHER SURGICAL HISTORY      Endotracheal tube placement   • OTHER SURGICAL HISTORY      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      Family History   Problem Relation Age of Onset   • Diabetes Mother    • Hypertension Father    • Cataracts Father    • Other Father         Benign prostatic hypertrophy witout outflow obstruction   • Heart disease Brother      Social History     Occupational History   • Not on file   Tobacco Use   • Smoking status: Former Smoker     Packs/day: 1.00     Last attempt to quit:      Years since quittin.4   • Smokeless tobacco: Former User   Substance and Sexual Activity   • Alcohol use: No   • Drug use: No   • Sexual activity: Defer   Social History Narrative   • Not on file       Review of Systems   Constitutional: Positive for fatigue. Negative for chills, diaphoresis, fever and unexpected weight change.   HENT: Positive for postnasal drip and rhinorrhea. Negative for  sore throat (recent strep throat).    Eyes: Negative.    Respiratory: Positive for cough, shortness of breath and wheezing.    Cardiovascular: Negative for leg swelling.   Gastrointestinal: Negative.    Endocrine: Negative.    Genitourinary: Positive for dysuria.   Musculoskeletal: Positive for arthralgias (right knee and hip arthralgias).   Allergic/Immunologic: Positive for environmental allergies.   Neurological: Negative for syncope and light-headedness.   Hematological: Negative for adenopathy. Does not bruise/bleed easily.   Psychiatric/Behavioral: Negative.        Objective   Physical Exam  During telephone interview.  Speech was fluent.  Speech pattern did not suggest respiratory distress.  Responds to questions and questions asked were appropriate.  Assessment/Plan   Diagnoses and all orders for this visit:    Allergic rhinitis, unspecified seasonality, unspecified trigger  -     fexofenadine (ALLEGRA) 180 mg tablet; Take 1 tablet (180 mg total) by mouth daily    Chronic obstructive pulmonary disease, unspecified COPD type (CMS/HCC) (HCC)    Hypoxemia    Severe COPD  Hypoxemia  Knee pain    Discussion;  Patient has severe COPD.  She has not required recent admissions.  She has not had recent exacerbations.  I would leave her on current medications.  I will refill her Allegra.  It does not sound as if she needs any steroids at this point or antibiotics.  She should continue oxygen 24/7.    I would like to reassess her here in the clinic in a few months.  She should continue to practice hygiene measures and social distancing during COVID-19 pandemic.    A total of 21 minutes were required for this telemedicine (video) visit.

## 2020-07-08 ENCOUNTER — TELEPHONE (OUTPATIENT)
Dept: PULMONOLOGY | Facility: CLINIC | Age: 60
End: 2020-07-08

## 2020-07-08 NOTE — TELEPHONE ENCOUNTER
"Patient calls, states that she feels like she has the flu. States that her neighbors have tested positive for the covid 19. Patient is worried about getting to hospital. Patient is c/o upset stomach, kidneys, fevers, does not have thermometer. Patient is requesting prednisone. Patient states that local ambulance will not transport her to her local ER due to her condition. States that her O2 sats drop when they move her and they do not want to transport her with her high oxygen needs and inability to help herself.   Dr Baker informed of patients condition. Recommends patient to be call ambulance and go to her local ER if she feels she has been in contact with anyone with covid 19, we cannot treat anyone over the phone.   Patient called back and informed of Dr Baker's recommendation. Requests \"something\" to get her to ER. Informed patient that we cannot prescribe anything at this time. Verbalizes understanding.   "

## 2020-11-05 ENCOUNTER — TELEPHONE - BILLABLE (OUTPATIENT)
Dept: PULMONOLOGY | Facility: CLINIC | Age: 60
End: 2020-11-05
Payer: COMMERCIAL

## 2020-11-05 DIAGNOSIS — J44.9 CHRONIC OBSTRUCTIVE PULMONARY DISEASE, UNSPECIFIED COPD TYPE (CMS/HCC): Primary | ICD-10-CM

## 2020-11-05 DIAGNOSIS — R09.02 HYPOXEMIA: ICD-10-CM

## 2020-11-05 PROCEDURE — 98968 PH1 ASSMT&MGMT NQHP 21-30: CPT | Performed by: INTERNAL MEDICINE

## 2020-11-05 RX ORDER — AMOXICILLIN AND CLAVULANATE POTASSIUM 875; 125 MG/1; MG/1
1 TABLET, FILM COATED ORAL 2 TIMES DAILY
Qty: 14 TABLET | Refills: 0 | Status: SHIPPED | OUTPATIENT
Start: 2020-11-05 | End: 2020-11-12

## 2020-11-05 ASSESSMENT — ENCOUNTER SYMPTOMS
LIGHT-HEADEDNESS: 0
RHINORRHEA: 1
DYSURIA: 1
GASTROINTESTINAL NEGATIVE: 1
BRUISES/BLEEDS EASILY: 0
FATIGUE: 1
ARTHRALGIAS: 1
FEVER: 0
ENDOCRINE NEGATIVE: 1
EYES NEGATIVE: 1
CHILLS: 0
PSYCHIATRIC NEGATIVE: 1
DIAPHORESIS: 0
SHORTNESS OF BREATH: 1
SORE THROAT: 1
ADENOPATHY: 0
COUGH: 1
UNEXPECTED WEIGHT CHANGE: 0
WHEEZING: 1

## 2020-11-05 NOTE — PROGRESS NOTES
Subjective   Per discussion with JANICE RAPHAEL MD, Denita, has verbally consented to be treated via a telephone based visit: Yes. A total of 21 minutes were required for this telephone based visit.   Patient Location: Home  Provider Location: Clinic  Technology used by Provider: Phone    HPI  Denita Spring is a 60 y.o. female who presents for follow-up of COPD.    HPI  Telephone telemedicine visit was performed.  Patient has severe COPD.  She reports being ill in August with fever.  Her mother is also sick and ultimately .  Patient did not get hospitalized and is not clear if she got tested for Covid but ultimately got better.  She now is somewhat sick again with sore throat and some purulent nasal drainage.  Her breathing is a little bit worse than baseline.  O2 needs have not changed.  She remains on 4 to 5 L of oxygen.    She is wondering about getting some Actifed which helps congestion the past.    She is had some nausea but no vomiting.  She states weight is stable.  She still has not addressed problems with her knee.    Her COPD she remains on oxygen, albuterol, Spiriva, and theophylline.      The following have been reviewed and updated as appropriate in this visit:    Allergies   Allergen Reactions   • Diphenhydramine      DENIES   • Cefuroxime      SUNBURN   • Cefuroxime Axetil    • Diphenhydramine Hcl    • Erythromycin Lactobionate    • Methylphenidate      ON FIRE   • Metoclopramide      dizzy, sweating   • Propoxyphene    • Propoxyphene N-Acetaminophen    • Tramadol      PARALYSIS   • Codeine      oxygen gets really low   • Erythromycin Base      GI UPSET   • Ibuprofen      DENIES   • Latex      DRY MOUTH     Current Outpatient Medications   Medication Sig Dispense Refill   • fexofenadine (ALLEGRA) 180 mg tablet Take 1 tablet (180 mg total) by mouth daily 30 tablet 11   • calcium carbonate EX (TUMS E-X) 300 mg (750 mg) chewable tablet Take 1,500 mg by mouth 4 (four) times a day as needed for  indigestion or heartburn.     • methyl salicylate-menthol 15-10 % cream See administration instructions. Apply a sufficient quantity to the affected area as directed for muscle or joint pain. **wash hands after use**     • ipratropium-albuterol (COMBIVENT RESPIMAT)  mcg/actuation inhaler Inhale 1 puff 4 (four) times a day.     • albuterol HFA (PROAIR HFA) 90 mcg/actuation inhaler Inhale 2 puffs every 4 (four) hours as needed for wheezing.     • sodium chloride (OCEAN) 0.65 % nasal spray Administer 1 spray into each nostril 4 (four) times a day.     • ferrous sulfate 325 mg (65 mg iron) tablet Take 65 mg by mouth 2 (two) times a day.     • guaiFENesin (MUCINEX) 600 mg 12 hr tablet Take 600 mg by mouth 3 (three) times a day as needed for cough.     • metFORMIN (GLUCOPHAGE) 500 mg tablet Take 500 mg by mouth 2 (two) times a day with meals.     • nystatin (MYCOSTATIN) cream Apply 1 application topically 3 (three) times a day.     • simethicone (MYLICON) 80 mg chewable tablet Take 80 mg by mouth 4 (four) times a day as needed for flatulence.     • cetirizine (ZyrTEC) 10 mg tablet Take 10 mg by mouth daily.     • ranitidine (ZANTAC) 150 mg tablet Take 150 mg by mouth nightly.     • acetaminophen (TYLENOL) 325 mg tablet Take 650 mg by mouth 4 (four) times a day as needed.     • hydrocortisone 1 % cream Apply 1 application topically 2 (two) times a day as needed.     • ibuprofen (ADVIL,MOTRIN) 800 mg tablet Take 800 mg by mouth every 8 (eight) hours as needed for pain scale 1-3/10.     • ALPRAZolam (XANAX) 0.25 mg tablet Take 0.25 mg by mouth nightly as needed for anxiety.     • nystatin (MYCOSTATIN) 100,000 unit/mL suspension Take 4 mL (400,000 Units total) by mouth 4 (four) times a day. 300 mL 0   • diltiazem CD (CARDIZEM CD) 240 mg 24 hr capsule Take 1 capsule (240 mg total) by mouth daily. 30 capsule 0   • albuterol 2.5 mg /3 mL nebulizer solution Take 3 mL (2.5 mg total) by nebulization every 6 (six) hours as  needed for wheezing. 75 mL 1   • theophylline (THEODUR) 200 mg 12 hr tablet Take 200 mg by mouth 2 (two) times a day.       • ergocalciferol (Vitamin D2) 1,250 mcg (50,000 unit) capsule Take 50,000 Units by mouth once a week Take on Thursday.      • tiotropium (SPIRIVA WITH HANDIHALER) 1 capsule = per inhalation capsule Place 1 capsule into inhaler and inhale every morning.     • fluticasone (FLONASE) 50 mcg/actuation nasal spray Administer 1 spray into each nostril 2 (two) times a day. Not to exceed 2 sprays in each nostril daily.      • alendronate (FOSAMAX) 70 mg tablet Take 70 mg by mouth every 7 (seven) days. Take on Thursday.      • omeprazole (PriLOSEC) 20 mg capsule Take 40 mg by mouth daily.       • montelukast (SINGULAIR) 10 mg tablet Take 10 mg by mouth nightly.       • ascorbic acid, vitamin C, (VITAMIN C) 500 mg tablet Take 1,000 mg by mouth daily.       • fluticasone-salmeterol (ADVAIR DISKUS) 500-50 mcg/dose diskus inhaler Inhale 1 puff 2 (two) times a day.       • amoxicillin-pot clavulanate (AUGMENTIN) 875-125 mg per tablet Take 1 tablet by mouth 2 (two) times a day for 7 days 14 tablet 0   • chlorpheniramine-phenylephrine 4-10 mg per tablet Take 1 tablet by mouth every 6 (six) hours as needed for congestion for up to 10 days 20 tablet 0   • predniSONE (DELTASONE) 10 mg tablet 4 tabs in the AM, 2 tabs PM for 5 days, then 4tab in the AM, 1 tab PM for 5 days, then 4 tab in the AM for 5 days, then 3 tab in AM then dis (Patient not taking: Reported on 9/5/2019 ) 100 tablet 0   • budesonide (PULMICORT) 0.5 mg/2 mL nebulizer solution Take 1 mg by nebulization 2 (two) times a day. Rinse mouth with water after use. Do not swallow.     • omega-3 fatty acids-fish oil (FISH OIL) 340-1,000 mg capsule Take 1,000 mg by mouth daily.     • metoprolol tartrate (LOPRESSOR) 25 mg tablet Take 1 tablet (25 mg total) by mouth 2 (two) times a day. (Patient not taking: Reported on 9/5/2019 ) 60 tablet 0   • docusate sodium  (COLACE) 100 mg capsule Take 100 mg by mouth 2 (two) times a day as needed. Takes after lunch and at bedtime      • multivitamin (THERAGRAN) tablet tablet Take 1 tablet by mouth daily with lunch.         No current facility-administered medications for this visit.      Past Medical History:   Diagnosis Date   • Arthritis    • Asthma    • Cancer (CMS/HCC) (HCC)     cervical CA   • COPD (chronic obstructive pulmonary disease) (CMS/HCC) (HCC)    • History of transfusion    • Obesity (BMI 30.0-34.9) 2018   • Osteoporosis    • Pneumonia    • Wears dentures      Past Surgical History:   Procedure Laterality Date   • CERVICAL BIOPSY     •  SECTION      x4   • COLONOSCOPY  10/01/2016   • COLONOSCOPY  2004   • COSMETIC SURGERY     • OTHER SURGICAL HISTORY      Cervical ablation for HPV   • OTHER SURGICAL HISTORY      Endotracheal tube placement   • OTHER SURGICAL HISTORY      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     Family History   Problem Relation Age of Onset   • Diabetes Mother    • Hypertension Father    • Cataracts Father    • Other Father         Benign prostatic hypertrophy witout outflow obstruction   • Heart disease Brother      Social History     Occupational History   • Not on file   Tobacco Use   • Smoking status: Former Smoker     Packs/day: 1.00     Quit date:      Years since quittin.8   • Smokeless tobacco: Former User   Substance and Sexual Activity   • Alcohol use: No   • Drug use: No   • Sexual activity: Defer   Social History Narrative   • Not on file       Review of Systems   Constitutional: Positive for fatigue. Negative for chills, diaphoresis, fever and unexpected weight change.   HENT: Positive for postnasal drip, rhinorrhea and sore throat.    Eyes: Negative.    Respiratory: Positive for cough,  shortness of breath and wheezing.    Cardiovascular: Negative for leg swelling.   Gastrointestinal: Negative.    Endocrine: Negative.    Genitourinary: Positive for dysuria.   Musculoskeletal: Positive for arthralgias (right knee and hip arthralgias).   Allergic/Immunologic: Positive for environmental allergies.   Neurological: Negative for syncope and light-headedness.   Hematological: Negative for adenopathy. Does not bruise/bleed easily.   Psychiatric/Behavioral: Negative.        Objective   Physical Exam  During telephone interview speech was fluent.  Speech pattern did not suggest respiratory distress.  Responses to questioning were appropriate.  Assessment/Plan   Diagnoses and all orders for this visit:    Chronic obstructive pulmonary disease, unspecified COPD type (CMS/[facility]) ([facility])  -     amoxicillin-pot clavulanate (AUGMENTIN) 875-125 mg per tablet; Take 1 tablet by mouth 2 (two) times a day for 7 days  -     chlorpheniramine-phenylephrine 4-10 mg per tablet; Take 1 tablet by mouth every 6 (six) hours as needed for congestion for up to 10 days    Hypoxemia    Discussion;  Patient is extremely advanced COPD.  She has some symptoms suggesting sinusitis now.  She had an acute illness back in August that was manage as an outpatient.  It would be suspicious that she may have had Covid at that time.    I am to give her a week of Augmentin and sends in some Actifed to use a decongestant.  She should continue her current bronchodilators and oxygen.    I will plan to reassess her in 4 months or sooner if needed.  She should seek prompt medical attention if breathing worsens.

## 2020-11-05 NOTE — PATIENT INSTRUCTIONS
Continue current medications  Actifed 1 tablet every 6 hours as as needed for congestion  Augmentin 875-125 mg 1 capsule twice a day for 7 days

## 2020-11-05 NOTE — PROGRESS NOTES
Received phone call from [facility] pharmacy, states that the chlorpheniramine-phenylephrine is nonformulary. New rx placed and sent off for patient.

## 2021-02-02 ENCOUNTER — TELEPHONE - BILLABLE (OUTPATIENT)
Dept: PULMONOLOGY | Facility: CLINIC | Age: 61
End: 2021-02-02
Payer: COMMERCIAL

## 2021-02-02 DIAGNOSIS — R09.02 HYPOXEMIA: ICD-10-CM

## 2021-02-02 DIAGNOSIS — J44.9 CHRONIC OBSTRUCTIVE PULMONARY DISEASE, UNSPECIFIED COPD TYPE (CMS/HCC): Primary | ICD-10-CM

## 2021-02-02 PROCEDURE — 98967 PH1 ASSMT&MGMT NQHP 11-20: CPT | Performed by: INTERNAL MEDICINE

## 2021-02-02 ASSESSMENT — ENCOUNTER SYMPTOMS
CHILLS: 0
RHINORRHEA: 1
EYES NEGATIVE: 1
ARTHRALGIAS: 1
FATIGUE: 1
ADENOPATHY: 0
BRUISES/BLEEDS EASILY: 0
SHORTNESS OF BREATH: 1
PSYCHIATRIC NEGATIVE: 1
WHEEZING: 1
GASTROINTESTINAL NEGATIVE: 1
FEVER: 0
UNEXPECTED WEIGHT CHANGE: 0
ENDOCRINE NEGATIVE: 1
SORE THROAT: 0
COUGH: 1
DYSURIA: 1
DIAPHORESIS: 0
LIGHT-HEADEDNESS: 0

## 2021-02-02 NOTE — PROGRESS NOTES
Subjective   Per discussion with JANICE RAPHAEL MD, Denita, has verbally consented to be treated via a telephone based visit: Yes. A total of 22 minutes were required for this telephone based visit.   Patient Location: Home  Provider Location: Clinic  Technology used by Provider: Phone    HPI  Denita Spring is a 60 y.o. female who presents for follow-up of COPD.    HPI  Telephone telemedicine visit performed.  Patient has significant shortness of breath with any activity but overall she feeling is stable.  She is not any recent colds or flu.  She states she is having difficulty with some dysuria.  She states they want to see her at the [facility] or [place] due to her risk of getting exposed to Covid 19.    She has clear sputum production.  She denies chest pains.  She has had no edema.  She is on oxygen 4 L.  She remains on Advair, Spiriva, Combivent as needed, and albuterol nebs as needed.  She has not had any recent hospitalization or urgent care visits.  She has not been on prednisone or antibiotics recently.    She reports she continues to have knee pain and cannot ambulate.  She states she has not had any further evaluation on the arthralgias.  The following have been reviewed and updated as appropriate in this visit:    Allergies   Allergen Reactions   • Diphenhydramine      DENIES   • Cefuroxime      SUNBURN   • Cefuroxime Axetil    • Diphenhydramine Hcl    • Erythromycin Lactobionate    • Methylphenidate      ON FIRE   • Metoclopramide      dizzy, sweating   • Propoxyphene    • Propoxyphene N-Acetaminophen    • Tramadol      PARALYSIS   • Codeine      oxygen gets really low   • Erythromycin Base      GI UPSET   • Ibuprofen      DENIES   • Latex      DRY MOUTH     Current Outpatient Medications   Medication Sig Dispense Refill   • fexofenadine (ALLEGRA) 180 mg tablet Take 1 tablet (180 mg total) by mouth daily 30 tablet 11   • calcium carbonate EX (TUMS E-X) 300 mg (750 mg) chewable tablet Take 1,500  mg by mouth 4 (four) times a day as needed for indigestion or heartburn.     • methyl salicylate-menthol 15-10 % cream See administration instructions. Apply a sufficient quantity to the affected area as directed for muscle or joint pain. **wash hands after use**     • ipratropium-albuterol (COMBIVENT RESPIMAT)  mcg/actuation inhaler Inhale 1 puff 4 (four) times a day.     • albuterol HFA (PROAIR HFA) 90 mcg/actuation inhaler Inhale 2 puffs every 4 (four) hours as needed for wheezing.     • sodium chloride (OCEAN) 0.65 % nasal spray Administer 1 spray into each nostril 4 (four) times a day.     • ferrous sulfate 325 mg (65 mg iron) tablet Take 65 mg by mouth 2 (two) times a day.     • omega-3 fatty acids-fish oil (FISH OIL) 340-1,000 mg capsule Take 1,000 mg by mouth daily.     • guaiFENesin (MUCINEX) 600 mg 12 hr tablet Take 600 mg by mouth 3 (three) times a day as needed for cough.     • metFORMIN (GLUCOPHAGE) 500 mg tablet Take 500 mg by mouth 2 (two) times a day with meals.     • nystatin (MYCOSTATIN) cream Apply 1 application topically 3 (three) times a day.     • simethicone (MYLICON) 80 mg chewable tablet Take 80 mg by mouth 4 (four) times a day as needed for flatulence.     • acetaminophen (TYLENOL) 325 mg tablet Take 650 mg by mouth 4 (four) times a day as needed.     • hydrocortisone 1 % cream Apply 1 application topically 2 (two) times a day as needed.     • albuterol 2.5 mg /3 mL nebulizer solution Take 3 mL (2.5 mg total) by nebulization every 6 (six) hours as needed for wheezing. 75 mL 1   • theophylline (THEODUR) 200 mg 12 hr tablet Take 200 mg by mouth 2 (two) times a day.       • ergocalciferol (Vitamin D2) 1,250 mcg (50,000 unit) capsule Take 50,000 Units by mouth once a week Take on Thursday.      • tiotropium (SPIRIVA WITH HANDIHALER) 1 capsule = per inhalation capsule Place 1 capsule into inhaler and inhale every morning.     • fluticasone (FLONASE) 50 mcg/actuation nasal spray Administer  1 spray into each nostril 2 (two) times a day. Not to exceed 2 sprays in each nostril daily.      • alendronate (FOSAMAX) 70 mg tablet Take 70 mg by mouth every 7 (seven) days. Take on Thursday.      • omeprazole (PriLOSEC) 20 mg capsule Take 40 mg by mouth daily.       • montelukast (SINGULAIR) 10 mg tablet Take 10 mg by mouth nightly.       • ascorbic acid, vitamin C, (VITAMIN C) 500 mg tablet Take 1,000 mg by mouth daily.       • docusate sodium (COLACE) 100 mg capsule Take 100 mg by mouth 2 (two) times a day as needed. Takes after lunch and at bedtime      • multivitamin (THERAGRAN) tablet tablet Take 1 tablet by mouth daily with lunch.       • fluticasone-salmeterol (ADVAIR DISKUS) 500-50 mcg/dose diskus inhaler Inhale 1 puff 2 (two) times a day.       • triprolidine-pseudoephedrine 1.25-30 mg tablet Take 1 tablet by mouth 3 (three) times a day as needed (for congestion for up to 10 days) (Patient not taking: Reported on 2/2/2021 ) 20 tablet 0   • predniSONE (DELTASONE) 10 mg tablet 4 tabs in the AM, 2 tabs PM for 5 days, then 4tab in the AM, 1 tab PM for 5 days, then 4 tab in the AM for 5 days, then 3 tab in AM then dis (Patient not taking: Reported on 9/5/2019 ) 100 tablet 0   • budesonide (PULMICORT) 0.5 mg/2 mL nebulizer solution Take 1 mg by nebulization 2 (two) times a day. Rinse mouth with water after use. Do not swallow.     • cetirizine (ZyrTEC) 10 mg tablet Take 10 mg by mouth daily.     • ranitidine (ZANTAC) 150 mg tablet Take 150 mg by mouth nightly.     • ibuprofen (ADVIL,MOTRIN) 800 mg tablet Take 800 mg by mouth every 8 (eight) hours as needed for pain scale 1-3/10.     • ALPRAZolam (XANAX) 0.25 mg tablet Take 0.25 mg by mouth nightly as needed for anxiety.     • nystatin (MYCOSTATIN) 100,000 unit/mL suspension Take 4 mL (400,000 Units total) by mouth 4 (four) times a day. 300 mL 0   • metoprolol tartrate (LOPRESSOR) 25 mg tablet Take 1 tablet (25 mg total) by mouth 2 (two) times a day. (Patient  not taking: Reported on 2019 ) 60 tablet 0   • diltiazem CD (CARDIZEM CD) 240 mg 24 hr capsule Take 1 capsule (240 mg total) by mouth daily. (Patient not taking: Reported on 2021 ) 30 capsule 0     No current facility-administered medications for this visit.      Past Medical History:   Diagnosis Date   • Arthritis    • Asthma    • Cancer (CMS/HCC) (HCC)     cervical CA   • COPD (chronic obstructive pulmonary disease) (CMS/HCC) (HCC)    • History of transfusion    • Obesity (BMI 30.0-34.9) 2018   • Osteoporosis    • Pneumonia    • Wears dentures      Past Surgical History:   Procedure Laterality Date   • CERVICAL BIOPSY     •  SECTION      x4   • COLONOSCOPY  10/01/2016   • COLONOSCOPY     • COSMETIC SURGERY     • OTHER SURGICAL HISTORY      Cervical ablation for HPV   • OTHER SURGICAL HISTORY      Endotracheal tube placement   • OTHER SURGICAL HISTORY      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     Family History   Problem Relation Age of Onset   • Diabetes Mother    • Hypertension Father    • Cataracts Father    • Other Father         Benign prostatic hypertrophy witout outflow obstruction   • Heart disease Brother      Social History     Occupational History   • Not on file   Tobacco Use   • Smoking status: Former Smoker     Packs/day: 1.00     Quit date:      Years since quittin.1   • Smokeless tobacco: Former User   Substance and Sexual Activity   • Alcohol use: No   • Drug use: No   • Sexual activity: Defer   Social History Narrative   • Not on file       Review of Systems   Constitutional: Positive for fatigue. Negative for chills, diaphoresis, fever and unexpected weight change.   HENT: Positive for postnasal drip and rhinorrhea. Negative for sore throat.    Eyes: Negative.    Respiratory: Positive for  cough, shortness of breath and wheezing.    Cardiovascular: Negative for leg swelling.   Gastrointestinal: Negative.    Endocrine: Negative.    Genitourinary: Positive for dysuria.   Musculoskeletal: Positive for arthralgias (right knee and hip arthralgias).   Allergic/Immunologic: Positive for environmental allergies.   Neurological: Negative for syncope and light-headedness.   Hematological: Negative for adenopathy. Does not bruise/bleed easily.   Psychiatric/Behavioral: Negative.        Objective   Physical Exam  During telephone interview speech was fluent.  Speech pattern did not suggest acute respiratory distress  Assessment/Plan   Diagnoses and all orders for this visit:    Chronic obstructive pulmonary disease, unspecified COPD type (CMS/HCC) ([facility])    Hypoxemia    Discussion;.  Patient is extremely advanced COPD.  She is not felt to be a candidate for lung transplant.  She said no recent exacerbations.  Sounds as if she has a number of other health issues need to be addressed.  I am not going to make any changes to her COPD care she should continue current bronchodilators.  She should continue current oxygen supplementation.    I do not know what the difficulty is with her getting care for her urinary symptoms and orthopedic issues.  She states she will get a hold of her visiting nurse and try to get evaluation initiated.    She should get COVID-19 vaccine when available to her.  I will arrange for her to get follow-up in pulmonary clinic in 4 months.

## 2021-04-02 ENCOUNTER — TELEPHONE - BILLABLE (OUTPATIENT)
Dept: PULMONOLOGY | Facility: CLINIC | Age: 61
End: 2021-04-02
Payer: COMMERCIAL

## 2021-04-02 DIAGNOSIS — J44.9 CHRONIC OBSTRUCTIVE PULMONARY DISEASE, UNSPECIFIED COPD TYPE (CMS/HCC): Primary | ICD-10-CM

## 2021-04-02 DIAGNOSIS — R09.02 HYPOXEMIA: ICD-10-CM

## 2021-04-02 PROCEDURE — 98967 PH1 ASSMT&MGMT NQHP 11-20: CPT | Performed by: INTERNAL MEDICINE

## 2021-04-02 RX ORDER — ARFORMOTEROL TARTRATE 15 UG/2ML
15 SOLUTION RESPIRATORY (INHALATION) 2 TIMES DAILY
Qty: 120 ML | Refills: 11 | Status: SHIPPED | OUTPATIENT
Start: 2021-04-02 | End: 2022-04-02

## 2021-04-02 RX ORDER — FLUTICASONE PROPIONATE AND SALMETEROL 500; 50 UG/1; UG/1
1 POWDER RESPIRATORY (INHALATION) 2 TIMES DAILY
Qty: 3 INHALER | Refills: 3 | Status: SHIPPED | OUTPATIENT
Start: 2021-04-02 | End: 2021-04-02 | Stop reason: ALTCHOICE

## 2021-04-02 ASSESSMENT — ENCOUNTER SYMPTOMS
FEVER: 0
EYES NEGATIVE: 1
FATIGUE: 1
NERVOUS/ANXIOUS: 1
GASTROINTESTINAL NEGATIVE: 1
RHINORRHEA: 1
WHEEZING: 0
SHORTNESS OF BREATH: 1
UNEXPECTED WEIGHT CHANGE: 0
SORE THROAT: 0
BRUISES/BLEEDS EASILY: 0
COUGH: 1
DIAPHORESIS: 0
ARTHRALGIAS: 1
ADENOPATHY: 0
CHILLS: 0
DYSURIA: 1
LIGHT-HEADEDNESS: 0
ENDOCRINE NEGATIVE: 1

## 2021-04-02 NOTE — PROGRESS NOTES
Subjective   Per discussion with JANICE RAPHAEL MD, Denita, has verbally consented to be treated via a telephone based visit: Yes. A total of 22 minutes were required for this telephone based visit.   Patient Location: Home  Provider Location: Clinic  Technology used by Provider: Phone    HPI  Denita Spring is a 61 y.o. female who presents for follow-up of COPD..    HPI  Telephone telemedicine visit was performed. The patient has extremely advanced COPD. She is evaluated a couple times the past for lung transplantation and was never felt to be a candidate. She did not want to travel for a face-to-face visit today.    Patient reports she is having little more trouble with breathing due to smoke near her home.  Is very difficult to sort out exactly what medication she is taking.  As best I can tell after questioning her multiple times she is on Brovana once a day, Spiriva once a day, albuterol nebulizer a couple times a day, theophylline 200 mg twice a day, montelukast and oxygen at 4 to 6 L.    She has not required prednisone.  She is had no urgent care visits.  She has not been hospitalized.  She reports that her urinary tract infections were evaluated some and apparently she had a stone.  I am not clear if this was a bladder stone or nephrolithiasis.  She states she has not seen anybody about her knee problems and remains wheelchair-bound.  She states she try to get Covid vaccine but her son was unable to get her into the clinic and no one would come out to give her the shot.    She denies chest pains.  She has had no edema.  She has mild cough and occasionally some yellow sputum.  The following have been reviewed and updated as appropriate in this visit:    Allergies   Allergen Reactions   • Cefuroxime Axetil    • Diphenhydramine Hcl      Patient denies   • Erythromycin Lactobionate    • Haloperidol      Other reaction(s): Unknown/Not Verified   • Methylphenidate      ON FIRE   • Metoclopramide      dizzy,  sweating   • Propoxyphene    • Tramadol      PARALYSIS   • Codeine      oxygen gets really low   • Latex      DRY MOUTH     Current Outpatient Medications   Medication Sig Dispense Refill   • fexofenadine (ALLEGRA) 180 mg tablet Take 1 tablet (180 mg total) by mouth daily 30 tablet 11   • calcium carbonate EX (TUMS E-X) 300 mg (750 mg) chewable tablet Take 1,500 mg by mouth 4 (four) times a day as needed for indigestion or heartburn.     • methyl salicylate-menthol 15-10 % cream See administration instructions. Apply a sufficient quantity to the affected area as directed for muscle or joint pain. **wash hands after use**     • ipratropium-albuterol (COMBIVENT RESPIMAT)  mcg/actuation inhaler Inhale 1 puff 4 (four) times a day.     • albuterol HFA (PROAIR HFA) 90 mcg/actuation inhaler Inhale 2 puffs every 4 (four) hours as needed for wheezing.     • sodium chloride (OCEAN) 0.65 % nasal spray Administer 1 spray into each nostril 4 (four) times a day.     • ferrous sulfate 325 mg (65 mg iron) tablet Take 325 mg by mouth daily       • omega-3 fatty acids-fish oil (FISH OIL) 340-1,000 mg capsule Take 1,000 mg by mouth daily.     • guaiFENesin (MUCINEX) 600 mg 12 hr tablet Take 600 mg by mouth 3 (three) times a day as needed for cough.     • metFORMIN (GLUCOPHAGE) 500 mg tablet Take 500 mg by mouth 2 (two) times a day with meals.     • simethicone (MYLICON) 80 mg chewable tablet Take 80 mg by mouth 4 (four) times a day as needed for flatulence.     • cetirizine (ZyrTEC) 10 mg tablet Take 10 mg by mouth daily.     • acetaminophen (TYLENOL) 325 mg tablet Take 650 mg by mouth 4 (four) times a day as needed.     • hydrocortisone 1 % cream Apply 1 application topically 2 (two) times a day as needed.     • ibuprofen (ADVIL,MOTRIN) 800 mg tablet Take 800 mg by mouth every 8 (eight) hours as needed for pain scale 1-3/10.     • albuterol 2.5 mg /3 mL nebulizer solution Take 3 mL (2.5 mg total) by nebulization every 6 (six)  hours as needed for wheezing. 75 mL 1   • theophylline (THEODUR) 200 mg 12 hr tablet Take 200 mg by mouth 2 (two) times a day.       • ergocalciferol (Vitamin D2) 1,250 mcg (50,000 unit) capsule Take 50,000 Units by mouth once a week Take on Thursday.      • tiotropium (SPIRIVA WITH HANDIHALER) 1 capsule = per inhalation capsule Place 1 capsule into inhaler and inhale every morning.     • fluticasone (FLONASE) 50 mcg/actuation nasal spray Administer 1 spray into each nostril 2 (two) times a day. Not to exceed 2 sprays in each nostril daily.      • alendronate (FOSAMAX) 70 mg tablet Take 70 mg by mouth every 7 (seven) days. Take on Thursday.      • omeprazole (PriLOSEC) 20 mg capsule Take 40 mg by mouth daily.       • montelukast (SINGULAIR) 10 mg tablet Take 10 mg by mouth nightly.       • ascorbic acid, vitamin C, (VITAMIN C) 500 mg tablet Take 1,000 mg by mouth daily.       • docusate sodium (COLACE) 100 mg capsule Take 100 mg by mouth 2 (two) times a day as needed. Takes after lunch and at bedtime      • multivitamin (THERAGRAN) tablet tablet Take 1 tablet by mouth daily with lunch.       • triprolidine-pseudoephedrine 1.25-30 mg tablet Take 1 tablet by mouth 3 (three) times a day as needed (for congestion for up to 10 days) (Patient not taking: Reported on 2/2/2021 ) 20 tablet 0   • predniSONE (DELTASONE) 10 mg tablet 4 tabs in the AM, 2 tabs PM for 5 days, then 4tab in the AM, 1 tab PM for 5 days, then 4 tab in the AM for 5 days, then 3 tab in AM then dis (Patient not taking: Reported on 9/5/2019 ) 100 tablet 0   • budesonide (PULMICORT) 0.5 mg/2 mL nebulizer solution Take 1 mg by nebulization 2 (two) times a day. Rinse mouth with water after use. Do not swallow.     • nystatin (MYCOSTATIN) cream Apply 1 application topically 3 (three) times a day.     • ranitidine (ZANTAC) 150 mg tablet Take 150 mg by mouth nightly.     • ALPRAZolam (XANAX) 0.25 mg tablet Take 0.25 mg by mouth nightly as needed for anxiety.      • nystatin (MYCOSTATIN) 100,000 unit/mL suspension Take 4 mL (400,000 Units total) by mouth 4 (four) times a day. (Patient not taking: Reported on 2021 ) 300 mL 0   • metoprolol tartrate (LOPRESSOR) 25 mg tablet Take 1 tablet (25 mg total) by mouth 2 (two) times a day. (Patient not taking: Reported on 2019 ) 60 tablet 0   • diltiazem CD (CARDIZEM CD) 240 mg 24 hr capsule Take 1 capsule (240 mg total) by mouth daily. (Patient not taking: Reported on 2021 ) 30 capsule 0   • fluticasone-salmeterol (ADVAIR DISKUS) 500-50 mcg/dose diskus inhaler Inhale 1 puff 2 (two) times a day.         No current facility-administered medications for this visit.     Past Medical History:   Diagnosis Date   • Arthritis    • Asthma    • Cancer (CMS/HCC) (HCC)     cervical CA   • COPD (chronic obstructive pulmonary disease) (CMS/HCC) (HCC)    • History of transfusion    • Obesity (BMI 30.0-34.9) 2018   • Osteoporosis    • Pneumonia    • Wears dentures      Past Surgical History:   Procedure Laterality Date   • CERVICAL BIOPSY     •  SECTION      x4   • COLONOSCOPY  10/01/2016   • COLONOSCOPY  2004   • COSMETIC SURGERY     • OTHER SURGICAL HISTORY      Cervical ablation for HPV   • OTHER SURGICAL HISTORY      Endotracheal tube placement   • OTHER SURGICAL HISTORY      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     Family History   Problem Relation Age of Onset   • Diabetes Mother    • Hypertension Father    • Cataracts Father    • Other Father         Benign prostatic hypertrophy witout outflow obstruction   • Heart disease Brother      Social History     Occupational History   • Not on file   Tobacco Use   • Smoking status: Former Smoker     Packs/day: 1.00     Quit date:      Years since quittin.2   • Smokeless tobacco: Former User    Substance and Sexual Activity   • Alcohol use: No   • Drug use: No   • Sexual activity: Defer   Social History Narrative   • Not on file       Review of Systems   Constitutional: Positive for fatigue. Negative for chills, diaphoresis, fever and unexpected weight change.   HENT: Positive for postnasal drip and rhinorrhea. Negative for sore throat.    Eyes: Negative.    Respiratory: Positive for cough and shortness of breath. Negative for wheezing.    Cardiovascular: Negative for leg swelling.   Gastrointestinal: Negative.    Endocrine: Negative.    Genitourinary: Positive for dysuria.   Musculoskeletal: Positive for arthralgias (right knee and hip arthralgias).   Allergic/Immunologic: Positive for environmental allergies.   Neurological: Negative for syncope and light-headedness.   Hematological: Negative for adenopathy. Does not bruise/bleed easily.   Psychiatric/Behavioral: The patient is nervous/anxious.        Objective   Physical Exam  During telephone interview speech was fluent.  Speech pattern did not suggest acute respiratory distress.  Assessment/Plan   Diagnoses and all orders for this visit:    Chronic obstructive pulmonary disease, unspecified COPD type (CMS/HCC) ([facility])    Hypoxemia    Discussion;  Patient has essentially end-stage lung disease.  She has not had prednisone for a few years and states she does not tolerate budesonide by nebulizer.  I think for right now we can manage her with Brovana which she should be taking twice a day, Spiriva, theophylline and albuterol as needed.  He should continue current oxygen supplementation.  I do not understand why she did not get Covid vaccination.  Is also unclear why she has not got any orthopedic care if that is indeed accurate.  I think she needs ongoing follow-up in pulmonary but really needs to be seen in person to sort out how she is doing.  I talk with her about my upcoming residential in all have her get back to clinic in 3 months with a new  provider.  She needs to bring in her medications and nebulizer drugs.  I did send in a new prescription for Latasha to take twice a day to call clinic.  She states she is getting the medication there now.    She should contact the clinic if breathing worsens and we can get her assessed sooner.  She should continue to try to get Covid vaccination through her primary care provider.

## 2021-04-02 NOTE — PATIENT INSTRUCTIONS
Take Brovana nebulizer twice a day  Continue Spiriva 1 dose daily  Use albuterol nebulizer or Combivent 3-4 times a day as needed  Continue oxygen  Continue theophylline  Bring in your medications at next visit with new pulmonologist to review

## 2021-12-01 ENCOUNTER — OFFICE VISIT (OUTPATIENT)
Dept: PULMONOLOGY | Facility: CLINIC | Age: 61
End: 2021-12-01
Payer: COMMERCIAL

## 2021-12-01 VITALS
DIASTOLIC BLOOD PRESSURE: 82 MMHG | SYSTOLIC BLOOD PRESSURE: 128 MMHG | HEIGHT: 63 IN | WEIGHT: 175 LBS | HEART RATE: 126 BPM | RESPIRATION RATE: 18 BRPM | BODY MASS INDEX: 31.01 KG/M2 | OXYGEN SATURATION: 97 %

## 2021-12-01 DIAGNOSIS — J44.9 CHRONIC OBSTRUCTIVE PULMONARY DISEASE, UNSPECIFIED COPD TYPE (CMS/HCC): Primary | ICD-10-CM

## 2021-12-01 PROCEDURE — 99215 OFFICE O/P EST HI 40 MIN: CPT | Performed by: PHYSICIAN ASSISTANT

## 2021-12-01 NOTE — Clinical Note
[facility] PULMONOLOGY  [address] [place] [address]  [phone]  Dept: [phone]  12/02/21      [address] [place] [address]        To: [facility]      Thank you for referring your patient, Denita Spring, to receive care in my office. I have enclosed a summary of the care provided to Denita on 12/02/21.    Please contact me with any questions you may have regarding the visit.    Sincerely,         HARMONY Fried  [address] [address]  [phone]    CC: No Recipients

## 2021-12-01 NOTE — PATIENT INSTRUCTIONS
Use the Brovana in the nebulizer twice a day    Use the Spiriva inhaler 2 puffs every morning    Use the combivent OR the albuterol inhaler as needed up to 2 puffs every 4-6 hours    Take the montelukast daily    OK to stop the theophylline    I sent an order for nebulizer kits to [facility]

## 2021-12-01 NOTE — LETTER
[facility] PULMONOLOGY  [address] [facility] [place] [address]  [phone]  Dept: [phone]  December 2, 2021     [address] [place] [address]    Patient: Denita Spring   YOB: 1960   Date of Visit: 12/1/2021       To:  [facility]    Our mutual patient, Denita Spring, was seen in my office on 12/1/2021. Below are my notes.     HARMONY Fried  12/2/2021 10:54 AM  Signed    PULMONARY NOTE      HPI:  Denita Spring is a 61 y.o. female patient who presents for follow-up regarding end-stage lung disease.  She is a former patient of Dr. Perkins.  FEV1 was 0.47 L (20% predicted) when checked in 2018.  She has been evaluated at [facility] as well as [facility] several years ago and was not deemed acceptable candidate for lung transplant at either facility.  She has not been seen here in person for several years.  Previously the patient was on prednisone chronically.  She has been off now for 1 to 2 years.  She reports she has not had any antibiotics recently for lung infections.  Sounds like she has really not had much healthcare over the past 12 to 18 months due to the pandemic.  She tells me she has significant knee pain which limits her mobility.  She tells me she cannot put much weight on her knee.  She had been told in the past she may need knee surgery but reports she has not seen orthopedics for at least a year.  She also complains of some left wrist pain weakness in her hand.  She complains of abdominal discomfort which she reports has been chronic.  She has not addressed this with her primary care provider.  As far as her breathing goes she is quite limited in her functional capacity.  She can get around her house quite minimally on her own.  When she goes out of the house she needs to use a wheelchair.  She does have a wheelchair of her own but according to her son it is in quite poor repair and needs to be replaced.   Her son reports she is needing me to talk to someone at [facility] about getting her a new wheelchair with an O2 tank taylor.  She reports daily cough with phlegm production which has been stable.  She will notice wheeze from time to time which is not increased from baseline.  For medication she is using Brovana nebulized twice a day.  She has a Spiriva inhaler but is only using this as needed.  She reports she is sometimes taking the theophylline although complains that it gives her headaches.  She has a Combivent which she uses probably 3-4 times per day.  She also has albuterol inhaler and nebulizer solution but reports she only uses these 'in an emergency.' She does not think she is dosing her PRN meds any sooner than every 4 hours.   She wears oxygen at 3-6L.         Review of Systems  Pertinent positives and negatives listed in the HPI.    The following portions of the patient's history were reviewed and updated as appropriate: allergies, current medications, past family history, past medical history, past social history, past surgical history and problem list.    History:  Social History     Tobacco Use   • Smoking status: Former Smoker     Packs/day: 1.00     Quit date:      Years since quittin.9   • Smokeless tobacco: Former User   Substance Use Topics   • Alcohol use: No   • Drug use: No         Immunizations:  Immunization History   Administered Date(s) Administered   • Fluarix/Flulaval/Fluzone Quad 2016       Medications:    Current Outpatient Medications:   •  chlorpheniramine/phenylephrine (ACTIFED COLD-ALLERGY ORAL), Take 1 capsule by mouth daily as needed, Disp: , Rfl:   •  arformoteroL (BROVANA) 15 mcg/2 mL nebulizer solution, Take 2 mL (15 mcg total) by nebulization 2 (two) times a day, Disp: 120 mL, Rfl: 11  •  calcium carbonate EX (TUMS E-X) 300 mg (750 mg) chewable tablet, Take 1,500 mg by mouth 4 (four) times a day as needed for indigestion or heartburn., Disp: , Rfl:   •   methyl salicylate-menthol 15-10 % cream, See administration instructions. Apply a sufficient quantity to the affected area as directed for muscle or joint pain. **wash hands after use**, Disp: , Rfl:   •  budesonide (PULMICORT) 0.5 mg/2 mL nebulizer solution, Take 1 mg by nebulization 2 (two) times a day. Rinse mouth with water after use. Do not swallow., Disp: , Rfl:   •  ipratropium-albuterol (COMBIVENT RESPIMAT)  mcg/actuation inhaler, Inhale 1 puff 4 (four) times a day., Disp: , Rfl:   •  albuterol HFA (PROAIR HFA) 90 mcg/actuation inhaler, Inhale 2 puffs every 4 (four) hours as needed for wheezing., Disp: , Rfl:   •  sodium chloride (OCEAN) 0.65 % nasal spray, Administer 1 spray into each nostril 4 (four) times a day., Disp: , Rfl:   •  ferrous sulfate 325 mg (65 mg iron) tablet, Take 325 mg by mouth daily  , Disp: , Rfl:   •  omega-3 fatty acids-fish oil (FISH OIL) 340-1,000 mg capsule, Take 1,000 mg by mouth daily., Disp: , Rfl:   •  guaiFENesin (MUCINEX) 600 mg 12 hr tablet, Take 600 mg by mouth 3 (three) times a day as needed for cough., Disp: , Rfl:   •  metFORMIN (GLUCOPHAGE) 500 mg tablet, Take 500 mg by mouth 2 (two) times a day with meals., Disp: , Rfl:   •  nystatin (MYCOSTATIN) cream, Apply 1 application topically 3 (three) times a day., Disp: , Rfl:   •  simethicone (MYLICON) 80 mg chewable tablet, Take 80 mg by mouth 4 (four) times a day as needed for flatulence., Disp: , Rfl:   •  cetirizine (ZyrTEC) 10 mg tablet, Take 10 mg by mouth daily., Disp: , Rfl:   •  ranitidine (ZANTAC) 150 mg tablet, Take 150 mg by mouth nightly., Disp: , Rfl:   •  acetaminophen (TYLENOL) 325 mg tablet, Take 650 mg by mouth 4 (four) times a day as needed., Disp: , Rfl:   •  hydrocortisone 1 % cream, Apply 1 application topically 2 (two) times a day as needed., Disp: , Rfl:   •  ibuprofen (ADVIL,MOTRIN) 800 mg tablet, Take 800 mg by mouth every 8 (eight) hours as needed for pain scale 1-3/10., Disp: , Rfl:   •   ALPRAZolam (XANAX) 0.25 mg tablet, Take 0.25 mg by mouth nightly as needed for anxiety., Disp: , Rfl:   •  nystatin (MYCOSTATIN) 100,000 unit/mL suspension, Take 4 mL (400,000 Units total) by mouth 4 (four) times a day., Disp: 300 mL, Rfl: 0  •  diltiazem CD (CARDIZEM CD) 240 mg 24 hr capsule, Take 1 capsule (240 mg total) by mouth daily., Disp: 30 capsule, Rfl: 0  •  albuterol 2.5 mg /3 mL nebulizer solution, Take 3 mL (2.5 mg total) by nebulization every 6 (six) hours as needed for wheezing., Disp: 75 mL, Rfl: 1  •  ergocalciferol (Vitamin D2) 1,250 mcg (50,000 unit) capsule, Take 50,000 Units by mouth once a week Take on Thursday. , Disp: , Rfl:   •  tiotropium (SPIRIVA WITH HANDIHALER) 1 capsule = per inhalation capsule, Place 1 capsule into inhaler and inhale every morning., Disp: , Rfl:   •  fluticasone (FLONASE) 50 mcg/actuation nasal spray, Administer 1 spray into each nostril 2 (two) times a day. Not to exceed 2 sprays in each nostril daily. , Disp: , Rfl:   •  alendronate (FOSAMAX) 70 mg tablet, Take 70 mg by mouth every 7 (seven) days. Take on Thursday. , Disp: , Rfl:   •  omeprazole (PriLOSEC) 20 mg capsule, Take 40 mg by mouth daily.  , Disp: , Rfl:   •  montelukast (SINGULAIR) 10 mg tablet, Take 10 mg by mouth nightly.  , Disp: , Rfl:   •  ascorbic acid, vitamin C, (VITAMIN C) 500 mg tablet, Take 1,000 mg by mouth daily.  , Disp: , Rfl:   •  docusate sodium (COLACE) 100 mg capsule, Take 100 mg by mouth 2 (two) times a day as needed. Takes after lunch and at bedtime , Disp: , Rfl:   •  multivitamin (THERAGRAN) tablet tablet, Take 1 tablet by mouth daily with lunch.  , Disp: , Rfl:     Allergies:  Allergies   Allergen Reactions   • Cefuroxime Axetil    • Diphenhydramine Hcl      Patient denies   • Erythromycin Lactobionate    • Haloperidol      Other reaction(s): Unknown/Not Verified   • Methylphenidate      ON FIRE   • Metoclopramide      dizzy, sweating   • Propoxyphene    • Pseudoephedrine Other (see  "comments)     Patient reports this makes her drowsy, vision issue, gi issues, tremors   • Tramadol      PARALYSIS   • Codeine      oxygen gets really low   • Latex      DRY MOUTH       Objective:  Vitals:    12/01/21 1224   BP: 128/82   BP Location: Left arm   Patient Position: Sitting   Cuff Size: Long Adult   Pulse: 126   Resp: 18   SpO2: 97%   Weight: 79.4 kg (175 lb)   Height: 1.6 m (5' 3\")       Physical Exam  Vitals and nursing note reviewed.   Constitutional:       General: She is not in acute distress.     Appearance: Normal appearance. She is not ill-appearing.   Cardiovascular:      Rate and Rhythm: Normal rate and regular rhythm.      Heart sounds: Normal heart sounds. No murmur heard.      Pulmonary:      Effort: Pulmonary effort is normal. No tachypnea or respiratory distress.      Breath sounds: Decreased air movement present. Decreased breath sounds present. No wheezing, rhonchi or rales.   Neurological:      Mental Status: She is alert.               Assessment:  1.  Severe end-stage COPD    PLAN:  1. Discussed with patient that it is very important for her to re-establish with her PCP to have her general health needs assessed. Also discussed importance of orthopedic visit to assess her knee and wrist pains.   2.  Patient's pulmonary medications reviewed in detail.  We discussed continuing the Brovana nebulized twice a day.  Discussed that she should use the Spiriva inhaler 2 puffs every day.  She can stop the theophylline since she thinks it is giving her headaches.  In the past she had been on Daliresp but I do not see this on her current list.  We reviewed not using albuterol more than every 4 hours.  She likes the Combivent best and so will use this as her primary rescue inhaler.  She will reserve the albuterol nebs for when she feels she cannot use the inhaler.  She will continue oxygen at 3 to 6 L.  I will send an order for new nebulizer supplies to her DME.  3.  I will write an order for a new " wheelchair with an oxygen tank taylor through [facility].  4. Patient has significant tracheobronchomalacia according to prior imaging and records from [facility]. Treatment for this was not mentioned in those records. She is not on CPAP or BiPAP although has worn BiPAP while inpatient in the past. Consider ABG to see if patient would be candidate for home BiPAP.       Follow-up in 6 months or sooner as needed    1. Chronic obstructive pulmonary disease, unspecified COPD type (CMS/HCC) ([facility])             Pt voices understanding and agreement to plan as stated above. All questions answered.          On this date of service 52 minutes of total time was spent on this encounter.        A voice recognition program was used to aid in medical record documentation. Sometimes words are printed not exactly as they were spoken. While efforts were made to carefully edit and correct any inaccuracies, some errors may be present. Errors should be taken within the context of the discussion.  Please contact our office if you need assistance interpreting this medical record or notice any mistakes.                  If you have questions, please do not hesitate to call me. I look forward to following your patient along with you.         Sincerely,        HARMONY Fried        CC: No Recipients

## 2021-12-02 NOTE — PROGRESS NOTES
PULMONARY NOTE      HPI:  Denita Spring is a 61 y.o. female patient who presents for follow-up regarding end-stage lung disease.  She is a former patient of Dr. Perkins.  FEV1 was 0.47 L (20% predicted) when checked in 2018.  She has been evaluated at [facility] as well as [facility] several years ago and was not deemed acceptable candidate for lung transplant at either facility.  She has not been seen here in person for several years.  Previously the patient was on prednisone chronically.  She has been off now for 1 to 2 years.  She reports she has not had any antibiotics recently for lung infections.  Sounds like she has really not had much healthcare over the past 12 to 18 months due to the pandemic.  She tells me she has significant knee pain which limits her mobility.  She tells me she cannot put much weight on her knee.  She had been told in the past she may need knee surgery but reports she has not seen orthopedics for at least a year.  She also complains of some left wrist pain weakness in her hand.  She complains of abdominal discomfort which she reports has been chronic.  She has not addressed this with her primary care provider.  As far as her breathing goes she is quite limited in her functional capacity.  She can get around her house quite minimally on her own.  When she goes out of the house she needs to use a wheelchair.  She does have a wheelchair of her own but according to her son it is in quite poor repair and needs to be replaced.  Her son reports she is needing me to talk to someone at [facility] about getting her a new wheelchair with an O2 tank taylor.  She reports daily cough with phlegm production which has been stable.  She will notice wheeze from time to time which is not increased from baseline.  For medication she is using Brovana nebulized twice a day.  She has a Spiriva inhaler but is only using this as needed.  She reports she is sometimes taking  the theophylline although complains that it gives her headaches.  She has a Combivent which she uses probably 3-4 times per day.  She also has albuterol inhaler and nebulizer solution but reports she only uses these 'in an emergency.' She does not think she is dosing her PRN meds any sooner than every 4 hours.   She wears oxygen at 3-6L.         Review of Systems  Pertinent positives and negatives listed in the HPI.    The following portions of the patient's history were reviewed and updated as appropriate: allergies, current medications, past family history, past medical history, past social history, past surgical history and problem list.    History:  Social History     Tobacco Use   • Smoking status: Former Smoker     Packs/day: 1.00     Quit date:      Years since quittin.9   • Smokeless tobacco: Former User   Substance Use Topics   • Alcohol use: No   • Drug use: No         Immunizations:  Immunization History   Administered Date(s) Administered   • Fluarix/Flulaval/Fluzone Quad 2016       Medications:    Current Outpatient Medications:   •  chlorpheniramine/phenylephrine (ACTIFED COLD-ALLERGY ORAL), Take 1 capsule by mouth daily as needed, Disp: , Rfl:   •  arformoteroL (BROVANA) 15 mcg/2 mL nebulizer solution, Take 2 mL (15 mcg total) by nebulization 2 (two) times a day, Disp: 120 mL, Rfl: 11  •  calcium carbonate EX (TUMS E-X) 300 mg (750 mg) chewable tablet, Take 1,500 mg by mouth 4 (four) times a day as needed for indigestion or heartburn., Disp: , Rfl:   •  methyl salicylate-menthol 15-10 % cream, See administration instructions. Apply a sufficient quantity to the affected area as directed for muscle or joint pain. **wash hands after use**, Disp: , Rfl:   •  budesonide (PULMICORT) 0.5 mg/2 mL nebulizer solution, Take 1 mg by nebulization 2 (two) times a day. Rinse mouth with water after use. Do not swallow., Disp: , Rfl:   •  ipratropium-albuterol (COMBIVENT RESPIMAT)  mcg/actuation  inhaler, Inhale 1 puff 4 (four) times a day., Disp: , Rfl:   •  albuterol HFA (PROAIR HFA) 90 mcg/actuation inhaler, Inhale 2 puffs every 4 (four) hours as needed for wheezing., Disp: , Rfl:   •  sodium chloride (OCEAN) 0.65 % nasal spray, Administer 1 spray into each nostril 4 (four) times a day., Disp: , Rfl:   •  ferrous sulfate 325 mg (65 mg iron) tablet, Take 325 mg by mouth daily  , Disp: , Rfl:   •  omega-3 fatty acids-fish oil (FISH OIL) 340-1,000 mg capsule, Take 1,000 mg by mouth daily., Disp: , Rfl:   •  guaiFENesin (MUCINEX) 600 mg 12 hr tablet, Take 600 mg by mouth 3 (three) times a day as needed for cough., Disp: , Rfl:   •  metFORMIN (GLUCOPHAGE) 500 mg tablet, Take 500 mg by mouth 2 (two) times a day with meals., Disp: , Rfl:   •  nystatin (MYCOSTATIN) cream, Apply 1 application topically 3 (three) times a day., Disp: , Rfl:   •  simethicone (MYLICON) 80 mg chewable tablet, Take 80 mg by mouth 4 (four) times a day as needed for flatulence., Disp: , Rfl:   •  cetirizine (ZyrTEC) 10 mg tablet, Take 10 mg by mouth daily., Disp: , Rfl:   •  ranitidine (ZANTAC) 150 mg tablet, Take 150 mg by mouth nightly., Disp: , Rfl:   •  acetaminophen (TYLENOL) 325 mg tablet, Take 650 mg by mouth 4 (four) times a day as needed., Disp: , Rfl:   •  hydrocortisone 1 % cream, Apply 1 application topically 2 (two) times a day as needed., Disp: , Rfl:   •  ibuprofen (ADVIL,MOTRIN) 800 mg tablet, Take 800 mg by mouth every 8 (eight) hours as needed for pain scale 1-3/10., Disp: , Rfl:   •  ALPRAZolam (XANAX) 0.25 mg tablet, Take 0.25 mg by mouth nightly as needed for anxiety., Disp: , Rfl:   •  nystatin (MYCOSTATIN) 100,000 unit/mL suspension, Take 4 mL (400,000 Units total) by mouth 4 (four) times a day., Disp: 300 mL, Rfl: 0  •  diltiazem CD (CARDIZEM CD) 240 mg 24 hr capsule, Take 1 capsule (240 mg total) by mouth daily., Disp: 30 capsule, Rfl: 0  •  albuterol 2.5 mg /3 mL nebulizer solution, Take 3 mL (2.5 mg total) by  "nebulization every 6 (six) hours as needed for wheezing., Disp: 75 mL, Rfl: 1  •  ergocalciferol (Vitamin D2) 1,250 mcg (50,000 unit) capsule, Take 50,000 Units by mouth once a week Take on Thursday. , Disp: , Rfl:   •  tiotropium (SPIRIVA WITH HANDIHALER) 1 capsule = per inhalation capsule, Place 1 capsule into inhaler and inhale every morning., Disp: , Rfl:   •  fluticasone (FLONASE) 50 mcg/actuation nasal spray, Administer 1 spray into each nostril 2 (two) times a day. Not to exceed 2 sprays in each nostril daily. , Disp: , Rfl:   •  alendronate (FOSAMAX) 70 mg tablet, Take 70 mg by mouth every 7 (seven) days. Take on Thursday. , Disp: , Rfl:   •  omeprazole (PriLOSEC) 20 mg capsule, Take 40 mg by mouth daily.  , Disp: , Rfl:   •  montelukast (SINGULAIR) 10 mg tablet, Take 10 mg by mouth nightly.  , Disp: , Rfl:   •  ascorbic acid, vitamin C, (VITAMIN C) 500 mg tablet, Take 1,000 mg by mouth daily.  , Disp: , Rfl:   •  docusate sodium (COLACE) 100 mg capsule, Take 100 mg by mouth 2 (two) times a day as needed. Takes after lunch and at bedtime , Disp: , Rfl:   •  multivitamin (THERAGRAN) tablet tablet, Take 1 tablet by mouth daily with lunch.  , Disp: , Rfl:     Allergies:  Allergies   Allergen Reactions   • Cefuroxime Axetil    • Diphenhydramine Hcl      Patient denies   • Erythromycin Lactobionate    • Haloperidol      Other reaction(s): Unknown/Not Verified   • Methylphenidate      ON FIRE   • Metoclopramide      dizzy, sweating   • Propoxyphene    • Pseudoephedrine Other (see comments)     Patient reports this makes her drowsy, vision issue, gi issues, tremors   • Tramadol      PARALYSIS   • Codeine      oxygen gets really low   • Latex      DRY MOUTH       Objective:  Vitals:    12/01/21 1224   BP: 128/82   BP Location: Left arm   Patient Position: Sitting   Cuff Size: Long Adult   Pulse: 126   Resp: 18   SpO2: 97%   Weight: 79.4 kg (175 lb)   Height: 1.6 m (5' 3\")       Physical Exam  Vitals and nursing note " reviewed.   Constitutional:       General: She is not in acute distress.     Appearance: Normal appearance. She is not ill-appearing.   Cardiovascular:      Rate and Rhythm: Normal rate and regular rhythm.      Heart sounds: Normal heart sounds. No murmur heard.      Pulmonary:      Effort: Pulmonary effort is normal. No tachypnea or respiratory distress.      Breath sounds: Decreased air movement present. Decreased breath sounds present. No wheezing, rhonchi or rales.   Neurological:      Mental Status: She is alert.               Assessment:  1.  Severe end-stage COPD    PLAN:  1. Discussed with patient that it is very important for her to re-establish with her PCP to have her general health needs assessed. Also discussed importance of orthopedic visit to assess her knee and wrist pains.   2.  Patient's pulmonary medications reviewed in detail.  We discussed continuing the Brovana nebulized twice a day.  Discussed that she should use the Spiriva inhaler 2 puffs every day.  She can stop the theophylline since she thinks it is giving her headaches.  In the past she had been on Daliresp but I do not see this on her current list.  We reviewed not using albuterol more than every 4 hours.  She likes the Combivent best and so will use this as her primary rescue inhaler.  She will reserve the albuterol nebs for when she feels she cannot use the inhaler.  She will continue oxygen at 3 to 6 L.  I will send an order for new nebulizer supplies to her DME.  3.  I will write an order for a new wheelchair with an oxygen tank taylor through [facility].  4. Patient has significant tracheobronchomalacia according to prior imaging and records from [facility]. Treatment for this was not mentioned in those records. She is not on CPAP or BiPAP although has worn BiPAP while inpatient in the past. Consider ABG to see if patient would be candidate for home BiPAP.       Follow-up in 6 months or sooner as needed    1. Chronic obstructive  pulmonary disease, unspecified COPD type (CMS/HCC) (HCC)             Pt voices understanding and agreement to plan as stated above. All questions answered.          On this date of service 52 minutes of total time was spent on this encounter.        A voice recognition program was used to aid in medical record documentation. Sometimes words are printed not exactly as they were spoken. While efforts were made to carefully edit and correct any inaccuracies, some errors may be present. Errors should be taken within the context of the discussion.  Please contact our office if you need assistance interpreting this medical record or notice any mistakes.

## 2022-02-16 ENCOUNTER — TELEPHONE (OUTPATIENT)
Dept: TRANSPLANT | Facility: CLINIC | Age: 62
End: 2022-02-16
Payer: MEDICAID

## 2022-02-16 NOTE — TELEPHONE ENCOUNTER
Patient Call: General    Reason for call: Patient requested to speak to Bertha writer attempted to inform patient she is not being followed by [facility] program but patient states Dr. Zavala is already aware of what she needs.    States it's in regards to needing a stent and Dr. Zavala will be sending a referral for her    Call back needed? Yes  Return Call Needed  Same as documented in contacts section

## 2022-02-23 NOTE — TELEPHONE ENCOUNTER
Contacted Denise to follow-up on phone call.  Confirmed that this writer is only working intermittently with program, but will convey her questions to Dr. Zavala.   Denise is wondering if [facility] would be able to place stent in her airway that has bronchomalacia. Discussed with Denise that will discuss with Dr. Zavala, but thought this has been explored with pulmonary and was not recommended.  Will contact Denise after review with Dr. Zavala.

## 2022-03-28 ENCOUNTER — TELEPHONE (OUTPATIENT)
Dept: TRANSPLANT | Facility: CLINIC | Age: 62
End: 2022-03-28
Payer: MEDICAID

## 2022-03-28 NOTE — TELEPHONE ENCOUNTER
Attempt made to reach out to patient to follow up on question regarding ability to stent her airways.     Lung Transplant team declined Denise for transplant in 2018 due to tracheobronchomalacia letter of decline was sent. Patient inquired at that time if  whether her airways could be stented.   CT was reviewed by Dr Zavala and with interventional who noted this could not stented.     Patient recently reached out to her previous lung transplant coordinator to re ask if stenting her airways was a possibility. Dr Zavala confirmed her airways could NOT be stented.

## 2022-06-08 ENCOUNTER — TELEPHONE - BILLABLE (OUTPATIENT)
Dept: PULMONOLOGY | Facility: CLINIC | Age: 62
End: 2022-06-08
Payer: COMMERCIAL

## 2022-06-08 DIAGNOSIS — J44.9 END STAGE COPD (CMS/HCC): Primary | ICD-10-CM

## 2022-06-08 PROCEDURE — 98967 PH1 ASSMT&MGMT NQHP 11-20: CPT | Mod: GT,NC | Performed by: PHYSICIAN ASSISTANT

## 2022-06-08 NOTE — PROGRESS NOTES
Subjective   Per discussion with HARMONY Fried, Denita, has verbally consented to be treated via a telephone based visit: Yes. A total of 13 minutes were required for this telephone based visit.   Patient Location: Home  Provider Location: Clinic  Technology used by Provider: Phone    HPI  Denita Spring is a 62 y.o. female who presents for follow-up regarding end-stage lung disease.  FEV1 was 0.47 L (20% predicted) when checked in 2018.  She has been evaluated at [facility] as well as [facility] several years ago and was not deemed acceptable candidate for lung transplant or stenting for tracheobronchomalacia.   Today the patient reports she has not been feeling well for the past few days.  She reports her grandson who cares for her tested positive for COVID yesterday.  He has been feeling ill since last week.  Patient reports 2 to 3 days of headaches as well as fever and chills.  She has not been checking her temperature.  She reports some dizziness.  She reports cough productive of phlegm.  She reports increased dyspnea.  She has not been seen or tested for her symptoms.  Otherwise reports breathing has been largely stable since her visit in December.  She continues on the Brovana neb and the Spiriva.  She is using her Combivent 3-4 times a day.  She continues oxygen at 3 to 6 L.    HPI    The following have been reviewed and updated as appropriate in this visit:    Allergies   Allergen Reactions   • Cefuroxime Axetil    • Diphenhydramine Hcl      Patient denies   • Erythromycin Lactobionate    • Haloperidol      Other reaction(s): Unknown/Not Verified   • Methylphenidate      ON FIRE   • Metoclopramide      dizzy, sweating   • Propoxyphene    • Pseudoephedrine Other (see comments)     Patient reports this makes her drowsy, vision issue, gi issues, tremors   • Tramadol      PARALYSIS   • Codeine      oxygen gets really low   • Latex      DRY MOUTH     Current Outpatient Medications    Medication Sig Dispense Refill   • chlorpheniramine/phenylephrine (ACTIFED COLD-ALLERGY ORAL) Take 1 capsule by mouth daily as needed     • calcium carbonate EX (TUMS EX) 300 mg (750 mg) chewable tablet Take 1,500 mg by mouth 4 (four) times a day as needed for indigestion or heartburn.     • methyl salicylate-menthol 15-10 % cream See administration instructions. Apply a sufficient quantity to the affected area as directed for muscle or joint pain. **wash hands after use**     • ipratropium-albuteroL (COMBIVENT RESPIMAT)  mcg/actuation inhaler Inhale 1 puff 4 (four) times a day.     • albuterol HFA 90 mcg/actuation inhaler Inhale 2 puffs every 4 (four) hours as needed for wheezing.     • sodium chloride (OCEAN) 0.65 % nasal spray Administer 1 spray into each nostril 4 (four) times a day.     • ferrous sulfate 325 mg (65 mg iron) tablet Take 325 mg by mouth daily       • omega-3 fatty acids-fish oil 340-1,000 mg capsule Take 1,000 mg by mouth daily.     • guaiFENesin (MUCINEX) 600 mg 12 hr tablet Take 600 mg by mouth 3 (three) times a day as needed for cough.     • metFORMIN (GLUCOPHAGE) 500 mg tablet Take 500 mg by mouth 2 (two) times a day with meals.     • nystatin (MYCOSTATIN) cream Apply 1 application topically 3 (three) times a day.     • simethicone (MYLICON) 80 mg chewable tablet Take 80 mg by mouth 4 (four) times a day as needed for flatulence.     • cetirizine (ZyrTEC) 10 mg tablet Take 10 mg by mouth daily.     • ranitidine (ZANTAC) 150 mg tablet Take 150 mg by mouth nightly.     • acetaminophen (TYLENOL) 325 mg tablet Take 650 mg by mouth 4 (four) times a day as needed.     • hydrocortisone 1 % cream Apply 1 application topically 2 (two) times a day as needed.     • ibuprofen (ADVIL,MOTRIN) 800 mg tablet Take 800 mg by mouth every 8 (eight) hours as needed for pain scale 1-3/10.     • ALPRAZolam (XANAX) 0.25 mg tablet Take 0.25 mg by mouth nightly as needed for anxiety.     • nystatin (MYCOSTATIN)  100,000 unit/mL suspension Take 4 mL (400,000 Units total) by mouth 4 (four) times a day. 300 mL 0   • diltiazem CD (CARDIZEM CD) 240 mg 24 hr capsule Take 1 capsule (240 mg total) by mouth daily. 30 capsule 0   • albuterol 2.5 mg /3 mL nebulizer solution Take 3 mL (2.5 mg total) by nebulization every 6 (six) hours as needed for wheezing. 75 mL 1   • ergocalciferol (Vitamin D2) 1,250 mcg (50,000 unit) capsule Take 50,000 Units by mouth once a week Take on Thursday.      • tiotropium (SPIRIVA WITH HANDIHALER) 1 capsule = per inhalation capsule Place 1 capsule into inhaler and inhale every morning.     • fluticasone (FLONASE) 50 mcg/actuation nasal spray Administer 1 spray into each nostril 2 (two) times a day. Not to exceed 2 sprays in each nostril daily.      • alendronate (FOSAMAX) 70 mg tablet Take 70 mg by mouth every 7 (seven) days. Take on Thursday.      • omeprazole (PriLOSEC) 20 mg capsule Take 40 mg by mouth daily.       • montelukast (SINGULAIR) 10 mg tablet Take 10 mg by mouth nightly.       • ascorbic acid, vitamin C, (VITAMIN C) 500 mg tablet Take 1,000 mg by mouth daily.       • docusate sodium (COLACE) 100 mg capsule Take 100 mg by mouth 2 (two) times a day as needed. Takes after lunch and at bedtime      • multivitamin (THERAGRAN) tablet tablet Take 1 tablet by mouth daily with lunch.         No current facility-administered medications for this visit.     Past Medical History:   Diagnosis Date   • Arthritis    • Asthma    • Cancer (CMS/HCC) (HCC)     cervical CA   • COPD (chronic obstructive pulmonary disease) (CMS/HCC) (HCC)    • History of transfusion    • Obesity (BMI 30.0-34.9) 2018   • Osteoporosis    • Pneumonia    • Wears dentures      Past Surgical History:   Procedure Laterality Date   • CERVICAL BIOPSY     •  SECTION      x4   • COLONOSCOPY  10/01/2016   • COLONOSCOPY  2004   • COSMETIC SURGERY     • OTHER SURGICAL HISTORY      Cervical ablation for HPV   • OTHER SURGICAL  HISTORY      Endotracheal tube placement   • OTHER SURGICAL HISTORY      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     Family History   Problem Relation Age of Onset   • Diabetes Mother    • Hypertension Father    • Cataracts Father    • Other Father         Benign prostatic hypertrophy witout outflow obstruction   • Heart disease Brother      Social History     Socioeconomic History   • Marital status:    Tobacco Use   • Smoking status: Former Smoker     Packs/day: 1.00     Quit date:      Years since quittin.4   • Smokeless tobacco: Former User   Substance and Sexual Activity   • Alcohol use: No   • Drug use: No   • Sexual activity: Defer     Social Determinants of Health     Tobacco Use: Medium Risk   • Smoking Tobacco Use: Former Smoker   • Smokeless Tobacco Use: Former User       Review of Systems Pertinent positives and negatives listed in the HPI.      Objective   Physical Exam Patient is appropriate verbally, able to complete full sentences, attentive in conversation, and asks appropriate questions.        Assessment/Plan   Diagnoses and all orders for this visit:    End stage COPD (CMS/[facility]) ([facility])      Patient with end-stage COPD who has recent exposure to COVID-19 positive individual and has been having increased respiratory and constitutional symptoms over the past few days.  I recommended she present to local urgent care or the ED for evaluation and testing.  Patient at first refuses and states she would just like some Sudafed to help with her congestion.  We again discussed the importance of appropriate care.  Discussed that I cannot appropriately evaluate her over the phone.  She states there is a field nurse who she will call to see if he will come to her house to test her for COVID.  Again I recommended she present  to local ED or urgent care for evaluation.  We will not make any changes to her respiratory regimen at this time.    We will check on her over the phone next week for an update.

## 2022-11-18 DIAGNOSIS — J44.1 CHRONIC OBSTRUCTIVE PULMONARY DISEASE WITH ACUTE EXACERBATION (CMS/HCC): Primary | ICD-10-CM

## 2022-11-22 ENCOUNTER — TELEPHONE (OUTPATIENT)
Dept: PULMONOLOGY | Facility: CLINIC | Age: 62
End: 2022-11-22
Payer: COMMERCIAL

## 2022-11-22 DIAGNOSIS — J44.1 CHRONIC OBSTRUCTIVE PULMONARY DISEASE WITH ACUTE EXACERBATION (CMS/HCC): ICD-10-CM

## 2023-01-03 ENCOUNTER — TELEPHONE (OUTPATIENT)
Dept: PULMONOLOGY | Facility: CLINIC | Age: 63
End: 2023-01-03
Payer: COMMERCIAL

## 2023-02-22 ENCOUNTER — TELEPHONE (OUTPATIENT)
Dept: INTERNAL MEDICINE | Facility: CLINIC | Age: 63
End: 2023-02-22
Payer: COMMERCIAL

## 2023-02-22 NOTE — TELEPHONE ENCOUNTER
HARMONY Fried LPN  Phone Number: [phone]     She has to be seen in person. I cannot address her acute issues over the phone. She will need to go to the local ED or urgent care or her PCP for that.           Previous Messages       ----- Message -----   From: Hannah Sal LPN   Sent: 2/21/2023  10:41 AM MST   To: HARMONY Fried   Subject: FW: Pt phone call-[place]                           Im not sure exactly how to handle since she is hard to get in but really needs to be seen in person as it has been well over a year.   ----- Message -----   From: Nurys Andersen   Sent: 2/20/2023   2:47 PM MST   To: , *   Subject: Pt phone call-[place]                               I did get Denita rescheduled for her appointment but she wants to go over symptoms and issues she is having as she felt to weak to make it to today's appointment and had to turn around and go back home. She was requesting telephone appointment, but Medicaid does not cover for telephone/audio appointments only video. Please call to further discuss her concerns. Thanks, Nurys

## 2023-03-15 ENCOUNTER — TELEPHONE (OUTPATIENT)
Dept: INTERNAL MEDICINE | Facility: CLINIC | Age: 63
End: 2023-03-15
Payer: COMMERCIAL

## 2023-03-15 NOTE — TELEPHONE ENCOUNTER
Patient called wanting to set up telephone appt. I discussed with the patient that it is unsafe for us to not see her in person atleast once a year and that if she got a ride to please us know and  I could get her scheduled.

## 2023-04-28 ENCOUNTER — TELEPHONE (OUTPATIENT)
Dept: INTERNAL MEDICINE | Facility: CLINIC | Age: 63
End: 2023-04-28
Payer: COMMERCIAL

## 2023-04-28 DIAGNOSIS — J44.1 CHRONIC OBSTRUCTIVE PULMONARY DISEASE WITH ACUTE EXACERBATION (CMS/HCC): ICD-10-CM

## 2023-04-28 NOTE — TELEPHONE ENCOUNTER
Patient calls and states that if she were to get some theophillin she could possibly make it to an appt. She also states that she needs new lungs and that. Dr. Perkins had started the paperwork for them. I advised the patient that she would need to be seen in order for us to day anything as its been a very long time since seeing her. She then stated that she thought lora wanted to care for her and I again reiterated that she does but that it is unsafe for us to no see her to treat her. She then rquested a new doctor. I let her know that all of the doctors in out clinic work together and that they would require her to be seen as well. I did let her know that i'd send you a note but that it would most likely state she needs to be seen or go to PCP. Patient then ended the call.

## 2023-05-01 RX ORDER — THEOPHYLLINE 200 MG/1
200 TABLET, EXTENDED RELEASE ORAL 2 TIMES DAILY
Qty: 60 TABLET | Refills: 0 | Status: SHIPPED | OUTPATIENT
Start: 2023-05-01 | End: 2024-04-30

## 2023-05-01 RX ORDER — ALBUTEROL SULFATE 90 UG/1
2 INHALANT RESPIRATORY (INHALATION) EVERY 4 HOURS PRN
Qty: 18 G | Refills: 0 | Status: ON HOLD | OUTPATIENT
Start: 2023-05-01 | End: 2023-06-22 | Stop reason: DRUGHIGH

## 2023-05-01 RX ORDER — TIOTROPIUM BROMIDE 18 UG/1
1 CAPSULE ORAL; RESPIRATORY (INHALATION) EVERY MORNING
Qty: 30 CAPSULE | Refills: 1 | Status: ON HOLD | OUTPATIENT
Start: 2023-05-01 | End: 2023-06-24 | Stop reason: ALTCHOICE

## 2023-05-01 NOTE — TELEPHONE ENCOUNTER
Pt had reached to [facility] last year. This is the note from them in care everywhere.  Telephone Encounter - Yasmin Willis, RN - 03/28/2022 5:06 PM CDT  Formatting of this note might be different from the original.  Attempt made to reach out to patient to follow up on question regarding ability to stent her airways.     Lung Transplant team declined Denita for transplant in 2018 due to tracheobronchomalacia letter of decline was sent. Patient inquired at that time if whether her airways could be stented.   CT was reviewed by Dr Wei and with interventional who noted this could not stented.     Patient recently reached out to her previous lung transplant coordinator to re ask if stenting her airways was a possibility. Dr Wei confirmed her airways could NOT be stented.

## 2023-05-03 NOTE — TELEPHONE ENCOUNTER
Message received from [facility] and they are unable to get the theophyline as its on back order. Is there something else we can send for her. I did call and ask her if there was another pharmacy and she states there is not.

## 2023-05-08 NOTE — TELEPHONE ENCOUNTER
Patient is calling stating that she is needing something for her itchy eyes and all over itching, possibly zyrtec. She also states that she needs something to be able to make it to an appt. Here like steroids. Please advise on how to proceed.

## 2023-06-22 ENCOUNTER — ANCILLARY PROCEDURE (OUTPATIENT)
Dept: RADIOLOGY | Facility: HOSPITAL | Age: 63
End: 2023-06-22
Payer: COMMERCIAL

## 2023-06-22 ENCOUNTER — APPOINTMENT (OUTPATIENT)
Dept: RADIOLOGY | Facility: HOSPITAL | Age: 63
DRG: 208 | End: 2023-06-22
Payer: COMMERCIAL

## 2023-06-22 ENCOUNTER — ANCILLARY PROCEDURE (OUTPATIENT)
Dept: ULTRASOUND IMAGING | Facility: HOSPITAL | Age: 63
DRG: 208 | End: 2023-06-22
Payer: COMMERCIAL

## 2023-06-22 ENCOUNTER — HOSPITAL ENCOUNTER (INPATIENT)
Facility: HOSPITAL | Age: 63
LOS: 6 days | Discharge: HOME | DRG: 208 | End: 2023-06-28
Attending: INTERNAL MEDICINE | Admitting: INTERNAL MEDICINE
Payer: COMMERCIAL

## 2023-06-22 DIAGNOSIS — R06.02 SHORTNESS OF BREATH: ICD-10-CM

## 2023-06-22 DIAGNOSIS — J44.1 COPD WITH ACUTE EXACERBATION (CMS/HCC): ICD-10-CM

## 2023-06-22 DIAGNOSIS — J96.01 ACUTE RESPIRATORY FAILURE WITH HYPOXIA (CMS/HCC): Primary | ICD-10-CM

## 2023-06-22 DIAGNOSIS — Z01.818 ENCOUNTER FOR INTUBATION: ICD-10-CM

## 2023-06-22 DIAGNOSIS — R51.9 NONINTRACTABLE HEADACHE, UNSPECIFIED CHRONICITY PATTERN, UNSPECIFIED HEADACHE TYPE: ICD-10-CM

## 2023-06-22 PROBLEM — J96.22 ACUTE ON CHRONIC RESPIRATORY FAILURE WITH HYPERCAPNIA (CMS/HCC): Status: ACTIVE | Noted: 2017-12-02

## 2023-06-22 LAB
ABO GROUP (TYPE) IN BLOOD: NORMAL
ANION GAP SERPL CALC-SCNC: 11 MMOL/L (ref 3–11)
ANTIBODY SCREEN: NORMAL
BASE EXCESS BLDA CALC-SCNC: -3.3 MMOL/L (ref -2–2)
BUN SERPL-MCNC: 17 MG/DL (ref 7–25)
CALCIUM SERPL-MCNC: 8.7 MG/DL (ref 8.6–10.3)
CHLORIDE SERPL-SCNC: 103 MMOL/L (ref 98–107)
CO2 BLDA-SCNC: 24 MMOL/L (ref 19–24)
CO2 SERPL-SCNC: 24 MMOL/L (ref 21–32)
CREAT SERPL-MCNC: 0.77 MG/DL (ref 0.6–1.1)
D AG BLD QL: NORMAL
DEVICE (RT): ABNORMAL
ERYTHROCYTE [DISTWIDTH] IN BLOOD BY AUTOMATED COUNT: 15.4 % (ref 11.5–14)
FIO2: 40 %
GFR SERPL CREATININE-BSD FRML MDRD: 88 ML/MIN/1.73M*2
GLUCOSE BLDC GLUCOMTR-SCNC: 129 MG/DL (ref 70–105)
GLUCOSE BLDC GLUCOMTR-SCNC: 190 MG/DL (ref 70–105)
GLUCOSE SERPL-MCNC: 201 MG/DL (ref 70–105)
HCO3 BLDA-SCNC: 24.8 MMOL/L (ref 23–29)
HCT VFR BLD AUTO: 32.4 % (ref 34–45)
HGB BLD-MCNC: 10.3 G/DL (ref 11.5–15.5)
MCH RBC QN AUTO: 27.8 PG (ref 28–33)
MCHC RBC AUTO-ENTMCNC: 31.7 G/DL (ref 32–36)
MCV RBC AUTO: 87.8 FL (ref 81–97)
MODE (RT): ABNORMAL
MV (RT): 5.13 L/MIN
P/F RATIO: 192 %
PATIENT POSITION: ABNORMAL
PCO2 BLDA: 59.4 MMHG (ref 35–45)
PEEP SET (RT): 8 CM/H2O
PH BLDA: 7.23 PH (ref 7.35–7.45)
PLATELET # BLD AUTO: 260 10*3/UL (ref 140–350)
PMV BLD AUTO: 7.4 FL (ref 6.9–10.8)
PO2 BLDA: 76.8 MMHG (ref 60–80)
POCT HEMOGLOBIN (MEASURED), ARTERIAL: 10 G/DL (ref 11.5–15.5)
POCT OXYHEMOGLOBIN, ARTERIAL: 90.7 %
POTASSIUM SERPL-SCNC: 4.2 MMOL/L (ref 3.5–5.1)
RBC # BLD AUTO: 3.69 10*6/ΜL (ref 3.7–5.3)
RR SET (RT): 18 B/MIN
RR TOTAL (RT): 18 B/MIN
SECOND/CONFIRMATORY ABORH PERFORMED: NORMAL
SODIUM SERPL-SCNC: 138 MMOL/L (ref 135–145)
SPO2 (RT): 96 %
VT OBSERVED (RT): 285 ML
VT SET (RT): 310 ML
WBC # BLD AUTO: 13.8 10*3/UL (ref 4.5–10.5)

## 2023-06-22 PROCEDURE — 2580000300 HC RX 258: Performed by: INTERNAL MEDICINE

## 2023-06-22 PROCEDURE — 36600 WITHDRAWAL OF ARTERIAL BLOOD: CPT

## 2023-06-22 PROCEDURE — 94002 VENT MGMT INPAT INIT DAY: CPT

## 2023-06-22 PROCEDURE — 82805 BLOOD GASES W/O2 SATURATION: CPT

## 2023-06-22 PROCEDURE — 2500000200 HC RX 250 WO HCPCS

## 2023-06-22 PROCEDURE — C1751 CATH, INF, PER/CENT/MIDLINE: HCPCS

## 2023-06-22 PROCEDURE — 99291 CRITICAL CARE FIRST HOUR: CPT | Performed by: INTERNAL MEDICINE

## 2023-06-22 PROCEDURE — 2590000100 HC RX 259: Performed by: INTERNAL MEDICINE

## 2023-06-22 PROCEDURE — 6360000200 HC RX 636 W HCPCS (ALT 250 FOR IP): Performed by: INTERNAL MEDICINE

## 2023-06-22 PROCEDURE — 039Y3ZZ DRAINAGE OF UPPER ARTERY, PERCUTANEOUS APPROACH: ICD-10-PCS | Performed by: INTERNAL MEDICINE

## 2023-06-22 PROCEDURE — (BLANK) HC ROOM ICU MEDICAL

## 2023-06-22 PROCEDURE — 94799 UNLISTED PULMONARY SVC/PX: CPT

## 2023-06-22 PROCEDURE — 36415 COLL VENOUS BLD VENIPUNCTURE: CPT | Performed by: INTERNAL MEDICINE

## 2023-06-22 PROCEDURE — 36410 VNPNXR 3YR/> PHY/QHP DX/THER: CPT

## 2023-06-22 PROCEDURE — 82947 ASSAY GLUCOSE BLOOD QUANT: CPT | Mod: QW

## 2023-06-22 PROCEDURE — 85027 COMPLETE CBC AUTOMATED: CPT | Performed by: INTERNAL MEDICINE

## 2023-06-22 PROCEDURE — 86901 BLOOD TYPING SEROLOGIC RH(D): CPT

## 2023-06-22 PROCEDURE — 80048 BASIC METABOLIC PNL TOTAL CA: CPT | Performed by: INTERNAL MEDICINE

## 2023-06-22 PROCEDURE — 6370000100 HC RX 637 (ALT 250 FOR IP): Performed by: INTERNAL MEDICINE

## 2023-06-22 PROCEDURE — 94640 AIRWAY INHALATION TREATMENT: CPT

## 2023-06-22 PROCEDURE — 76937 US GUIDE VASCULAR ACCESS: CPT

## 2023-06-22 PROCEDURE — 5A1935Z RESPIRATORY VENTILATION, LESS THAN 24 CONSECUTIVE HOURS: ICD-10-PCS | Performed by: INTERNAL MEDICINE

## 2023-06-22 RX ORDER — CHOLECALCIFEROL (VITAMIN D3) 25 MCG
1000 TABLET ORAL DAILY
COMMUNITY

## 2023-06-22 RX ORDER — ROFLUMILAST 500 UG/1
500 TABLET ORAL DAILY
COMMUNITY

## 2023-06-22 RX ORDER — SODIUM CHLORIDE, SODIUM LACTATE, POTASSIUM CHLORIDE, AND CALCIUM CHLORIDE .6; .31; .03; .02 G/100ML; G/100ML; G/100ML; G/100ML
1000 INJECTION, SOLUTION INTRAVENOUS ONCE
Status: COMPLETED | OUTPATIENT
Start: 2023-06-22 | End: 2023-06-22

## 2023-06-22 RX ORDER — ENOXAPARIN SODIUM 100 MG/ML
40 INJECTION SUBCUTANEOUS
Status: DISCONTINUED | OUTPATIENT
Start: 2023-06-22 | End: 2023-06-23 | Stop reason: SDUPTHER

## 2023-06-22 RX ORDER — PROPOFOL 10 MG/ML
5-50 INJECTION, EMULSION INTRAVENOUS
Status: DISCONTINUED | OUTPATIENT
Start: 2023-06-22 | End: 2023-06-23

## 2023-06-22 RX ORDER — FENTANYL CITRATE/PF 50 MCG/ML
25-150 PLASTIC BAG, INJECTION (ML) INTRAVENOUS
Status: DISCONTINUED | OUTPATIENT
Start: 2023-06-22 | End: 2023-06-23

## 2023-06-22 RX ORDER — DEXTROSE 40 %
15 GEL (GRAM) ORAL
Status: DISCONTINUED | OUTPATIENT
Start: 2023-06-22 | End: 2023-06-25

## 2023-06-22 RX ORDER — BACITRACIN 500 [USP'U]/G
1 OINTMENT TOPICAL 2 TIMES DAILY
COMMUNITY

## 2023-06-22 RX ORDER — DILTIAZEM HYDROCHLORIDE 180 MG/1
180 CAPSULE, EXTENDED RELEASE ORAL DAILY
COMMUNITY

## 2023-06-22 RX ORDER — ALBUTEROL SULFATE 0.83 MG/ML
2.5 SOLUTION RESPIRATORY (INHALATION)
Status: DISCONTINUED | OUTPATIENT
Start: 2023-06-22 | End: 2023-06-28 | Stop reason: HOSPADM

## 2023-06-22 RX ORDER — FORMOTEROL FUMARATE DIHYDRATE 20 UG/2ML
20 SOLUTION RESPIRATORY (INHALATION)
Status: DISCONTINUED | OUTPATIENT
Start: 2023-06-22 | End: 2023-06-23

## 2023-06-22 RX ORDER — MINERAL OIL
180 ENEMA (ML) RECTAL DAILY
COMMUNITY

## 2023-06-22 RX ORDER — FLUTICASONE PROPIONATE AND SALMETEROL 500; 50 UG/1; UG/1
1 POWDER RESPIRATORY (INHALATION) 2 TIMES DAILY
Status: ON HOLD | COMMUNITY
End: 2023-06-28 | Stop reason: SDUPTHER

## 2023-06-22 RX ORDER — NOREPINEPHRINE BITARTRATE 0.03 MG/ML
.5-3 INJECTION, SOLUTION INTRAVENOUS
Status: DISCONTINUED | OUTPATIENT
Start: 2023-06-22 | End: 2023-06-23

## 2023-06-22 RX ORDER — ETHYL ALCOHOL 62 ML/100ML
1 SWAB TOPICAL 2 TIMES DAILY
Status: DISCONTINUED | OUTPATIENT
Start: 2023-06-22 | End: 2023-06-22

## 2023-06-22 RX ORDER — SODIUM CHLORIDE, SODIUM LACTATE, POTASSIUM CHLORIDE, CALCIUM CHLORIDE 600; 310; 30; 20 MG/100ML; MG/100ML; MG/100ML; MG/100ML
100 INJECTION, SOLUTION INTRAVENOUS CONTINUOUS
Status: DISCONTINUED | OUTPATIENT
Start: 2023-06-22 | End: 2023-06-23

## 2023-06-22 RX ORDER — LORATADINE 10 MG/1
10 TABLET ORAL DAILY
COMMUNITY

## 2023-06-22 RX ORDER — ETHYL ALCOHOL 62 ML/100ML
1 SWAB TOPICAL 2 TIMES DAILY
Status: DISCONTINUED | OUTPATIENT
Start: 2023-06-22 | End: 2023-06-28 | Stop reason: HOSPADM

## 2023-06-22 RX ORDER — BISACODYL 10 MG/1
10 SUPPOSITORY RECTAL EVERY 6 HOURS PRN
Status: DISCONTINUED | OUTPATIENT
Start: 2023-06-22 | End: 2023-06-28 | Stop reason: HOSPADM

## 2023-06-22 RX ORDER — DEXTROSE 50 % IN WATER (D50W) INTRAVENOUS SYRINGE
15-30
Status: DISCONTINUED | OUTPATIENT
Start: 2023-06-22 | End: 2023-06-25

## 2023-06-22 RX ORDER — SODIUM CHLORIDE 0.9 % (FLUSH) 0.9 %
10 SYRINGE (ML) INJECTION AS NEEDED
Status: DISCONTINUED | OUTPATIENT
Start: 2023-06-22 | End: 2023-06-28 | Stop reason: HOSPADM

## 2023-06-22 RX ORDER — NOREPINEPHRINE BITARTRATE 0.03 MG/ML
INJECTION, SOLUTION INTRAVENOUS
Status: COMPLETED
Start: 2023-06-22 | End: 2023-06-22

## 2023-06-22 RX ORDER — ALBUTEROL SULFATE 90 UG/1
2 INHALANT RESPIRATORY (INHALATION) EVERY 6 HOURS PRN
COMMUNITY

## 2023-06-22 RX ORDER — ACETAMINOPHEN 325 MG/1
650 TABLET ORAL EVERY 6 HOURS PRN
Status: DISCONTINUED | OUTPATIENT
Start: 2023-06-22 | End: 2023-06-28 | Stop reason: HOSPADM

## 2023-06-22 RX ORDER — SENNOSIDES 8.6 MG/1
17.2 TABLET ORAL NIGHTLY
Status: DISCONTINUED | OUTPATIENT
Start: 2023-06-22 | End: 2023-06-28 | Stop reason: HOSPADM

## 2023-06-22 RX ORDER — FAMOTIDINE 10 MG/ML
20 INJECTION INTRAVENOUS 2 TIMES DAILY
Status: DISCONTINUED | OUTPATIENT
Start: 2023-06-22 | End: 2023-06-25

## 2023-06-22 RX ORDER — BUDESONIDE 1 MG/2ML
1 INHALANT ORAL 2 TIMES DAILY
Status: DISCONTINUED | OUTPATIENT
Start: 2023-06-22 | End: 2023-06-23

## 2023-06-22 RX ADMIN — SODIUM CHLORIDE, POTASSIUM CHLORIDE, SODIUM LACTATE AND CALCIUM CHLORIDE 100 ML/HR: 600; 310; 30; 20 INJECTION, SOLUTION INTRAVENOUS at 18:18

## 2023-06-22 RX ADMIN — SODIUM CHLORIDE, POTASSIUM CHLORIDE, SODIUM LACTATE AND CALCIUM CHLORIDE 1000 ML: 600; 310; 30; 20 INJECTION, SOLUTION INTRAVENOUS at 17:03

## 2023-06-22 RX ADMIN — PROPOFOL 40 MCG/KG/MIN: 10 INJECTION, EMULSION INTRAVENOUS at 18:31

## 2023-06-22 RX ADMIN — PROPOFOL 40 MCG/KG/MIN: 10 INJECTION, EMULSION INTRAVENOUS at 17:50

## 2023-06-22 RX ADMIN — FAMOTIDINE 20 MG: 10 INJECTION INTRAVENOUS at 20:24

## 2023-06-22 RX ADMIN — BUDESONIDE 1 MG: 1 SUSPENSION RESPIRATORY (INHALATION) at 19:36

## 2023-06-22 RX ADMIN — NOREPINEPHRINE BITARTRATE 8 MG: 0.03 INJECTION, SOLUTION INTRAVENOUS at 17:36

## 2023-06-22 RX ADMIN — FORMOTEROL FUMARATE DIHYDRATE 20 MCG: 20 SOLUTION RESPIRATORY (INHALATION) at 19:35

## 2023-06-22 RX ADMIN — PROPOFOL 40 MCG/KG/MIN: 10 INJECTION, EMULSION INTRAVENOUS at 18:30

## 2023-06-22 RX ADMIN — ETHYL ALCOHOL 1 APPLICATION: 62 SWAB TOPICAL at 20:20

## 2023-06-22 RX ADMIN — NOREPINEPHRINE BITARTRATE 12 MCG/MIN: 0.03 INJECTION, SOLUTION INTRAVENOUS at 18:30

## 2023-06-22 RX ADMIN — METHYLPREDNISOLONE SODIUM SUCCINATE 60 MG: 125 INJECTION, POWDER, FOR SOLUTION INTRAMUSCULAR; INTRAVENOUS at 17:36

## 2023-06-22 RX ADMIN — SENNOSIDES 17.2 MG: 8.6 TABLET, FILM COATED ORAL at 20:24

## 2023-06-22 RX ADMIN — PROPOFOL 40 MCG/KG/MIN: 10 INJECTION, EMULSION INTRAVENOUS at 22:15

## 2023-06-22 ASSESSMENT — ACTIVITIES OF DAILY LIVING (ADL): ADEQUATE_TO_COMPLETE_ADL: UNABLE TO ASSESS

## 2023-06-22 NOTE — PLAN OF CARE
Problem: Safety - Medical Restraint  Goal: Remains free of injury from restraints (Restraint for Interference with Medical Device)  Description: INTERVENTIONS:  1. Determine that other, less restrictive measures have been tried or would not be effective before applying the restraint  2. Evaluate the patient's condition at the time of restraint application  3. Inform patient/family regarding the reason for restraint  4. Monitor safety, psychosocial status, comfort, nutrition and hydration  5. Assess continued need for restraints  6. Identify and implement measures to help patient regain control  Outcome: Progressing     Problem: Knowledge Deficit  Goal: Patient/family/caregiver demonstrates understanding of disease process, treatment plan, medications, and discharge instructions  Description: INTERVENTIONS:   1. Complete learning assessment and assess knowledge base  2. Provide teaching at level of understanding   3. Provide teaching via preferred learning methods  Outcome: Progressing     Problem: Potential for Compromised Skin Integrity  Goal: Skin Integrity is Maintained or Improved  Description: INTERVENTIONS:  1. Assess and monitor skin integrity  2. Collaborate with interdisciplinary team and initiate plans and interventions as needed  3. Alternate a full bath with partial baths for elderly   4. Monitor patient's hygiene practices   5. Collaborate with wound, ostomy, and continence nurse  Outcome: Progressing  Goal: Nutritional status is improving  Description: INTERVENTIONS:  1. Monitor and assess patient for malnutrition (ex- brittle hair, bruises, dry skin, pale skin and conjunctiva, muscle wasting, smooth red tongue, and disorientation)  2. Monitor patient's weight and dietary intake as ordered or per policy  3. Determine patient's food preferences and provide high-protein, high-caloric foods as appropriate  4. Assist patient with eating   5. Allow adequate time for meals   6. Encourage patient to take  dietary supplement as ordered   7. Collaborate with dietitian  8. Include patient/family/caregiver in decisions related to nutrition  Outcome: Progressing  Goal: MOBILITY IS MAINTAINED OR IMPROVED  Description: INTERVENTIONS  1. Collaborate with interdisciplinary team and initiate plan and interventions as ordered (PT/OT)  2. Encourage ambulation  3. Up to chair for meals  4. Monitor for signs of deconditioning  Outcome: Progressing     Problem: Urinary Incontinence  Goal: Perineal skin integrity is maintained or improved  Description: INTERVENTIONS:  1. Assess genitourinary system, perineal skin, labs (urinalysis), and history of incontinence to include past management, aggravating, and alleviating factors  2. Collaborate with interdisciplinary team including wound, ostomy, and continence nurse and initiate plans and interventions as needed  4. Consider urine containment device  5. Apply skin protectant   6. Develop skin care regimen  7. Provide privacy when changing patient's incontinence device to maintain their dignity  Outcome: Progressing     Problem: Safety Adult - Fall  Goal: Free from fall injury  Description: INTERVENTIONS:    Inpatient - Please reference Cares/Safety Flowsheet under [facility] Fall Risk for interventions.  Pediatrics - Please reference Peds Daily Cares/Safety Flowsheet under [facility] Pediatric Fall Assessment Fall Bundle for interventions  LD/OB - Please reference OB Shift Screening Flowsheet under OB Fall Risk for interventions.  Outcome: Progressing

## 2023-06-22 NOTE — H&P
Critical care H&P:    HPI  Denita Spring is a 63 y.o. female history of severe COPD last seen in 2021 by Chasity ANTUNEZ in pulmonary clinic presented to [facility] in acute respiratory distress with a pH of 7.17 PCO2 of 84 and PO2 of 178 requiring intubation and was transferred to [facility] for higher level of care.  Patient is sedated intubated at the time of evaluation.  The was also told and report that the patient became hypotensive at the time of intubation received 1 L of normal saline and was started on LR at 100 cc an.  Patient also given 125 mg Solu-Medrol at [facility] and albuterol [facility].  She was a 1 pack/day smoker quit .  The last note says that the patient was on Brovana twice daily Spiriva as needed and Combivent which he uses about 4 times a day.  It is not clear whether these are the same medication she uses now.  She is also on's approximately 6 L of nasal cannula at home.  Chest x-ray at [facility] was unremarkable except for hyper ventilation.  While WBC count is elevated no clear evidence for sepsis patient did receive Levaquin and vancomycin at [facility].  For present will not continue antibiotics here.  Will continue Solu-Medrol Perforomist Pulmicort and albuterol as needed.  Patient has been evaluated in lung transplant status to [place] in [place] but was declined for unclear reasons.  PFT 2015 showed an FVC of 1.37 and an FEV1 of 0.46 with a ratio of 34%    Medical History:  Past Medical History:   Diagnosis Date    Arthritis     Asthma     Cancer (CMS/[facility])     cervical CA    COPD (chronic obstructive pulmonary disease) (CMS/[facility])     History of transfusion     Obesity (BMI 30.0-34.9) 2018    Osteoporosis     Pneumonia     Wears dentures     .    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       Social History:  Social History     Socioeconomic History    Marital status:      Spouse name: Not on file    Number of children: Not on file    Years of education: Not on file    Highest education level: Not on file   Occupational History    Not on file   Tobacco Use    Smoking status: Former     Packs/day: 1.00     Types: Cigarettes     Quit date:      Years since quittin.4    Smokeless tobacco: Former   Substance and Sexual Activity    Alcohol use: No    Drug use: No    Sexual activity: Defer   Other Topics Concern    Not on file   Social History Narrative    Not on file     Social Determinants of Health     Financial Resource Strain: Not on file   Food Insecurity: Not on file   Transportation Needs: Not on file   Physical Activity: Not on file   Stress: Not on file   Social Connections: Not on file   Intimate Partner Violence: Not on file   Housing Stability: Not on file       Family History:   family history includes Cataracts in her father; Diabetes in her mother; Heart disease in her brother; Hypertension in her father; Other in her father.    Allergies:  Allergies   Allergen Reactions    Cefuroxime Axetil     Diphenhydramine Hcl      Patient denies    Erythromycin Lactobionate     Haloperidol      Other reaction(s): Unknown/Not Verified    Methylphenidate      ON FIRE    Metoclopramide      dizzy, sweating    Propoxyphene     Pseudoephedrine Other (see comments)     Patient reports this makes her drowsy, vision issue, gi issues, tremors    Tramadol      PARALYSIS    Codeine      oxygen gets really low    Latex      DRY  MOUTH       Medications:   Medications Prior to Admission   Medication Sig    albuterol HFA 90 mcg/actuation inhaler Inhale 2 puffs every 4 (four) hours as needed for wheezing    ipratropium-albuteroL (COMBIVENT RESPIMAT)  mcg/actuation inhaler Inhale 1 puff 4 (four) times a day    tiotropium (Spiriva with HandiHaler) 1 capsule = per inhalation capsule Place 1 capsule (18 mcg total) into inhaler and inhale every morning    theophylline (THEODUR) 200 mg 12 hr tablet Take 1 tablet (200 mg total) by mouth 2 (two) times a day    chlorpheniramine/phenylephrine (ACTIFED COLD-ALLERGY ORAL) Take 1 capsule by mouth daily as needed    calcium carbonate EX (TUMS EX) 300 mg (750 mg) chewable tablet Take 1,500 mg by mouth 4 (four) times a day as needed for indigestion or heartburn.    methyl salicylate-menthol 15-10 % cream See administration instructions. Apply a sufficient quantity to the affected area as directed for muscle or joint pain. **wash hands after use**    sodium chloride (OCEAN) 0.65 % nasal spray Administer 1 spray into each nostril 4 (four) times a day.    ferrous sulfate 325 mg (65 mg iron) tablet Take 325 mg by mouth daily      omega-3 fatty acids-fish oil 340-1,000 mg capsule Take 1,000 mg by mouth daily.    guaiFENesin (MUCINEX) 600 mg 12 hr tablet Take 600 mg by mouth 3 (three) times a day as needed for cough.    metFORMIN (GLUCOPHAGE) 500 mg tablet Take 500 mg by mouth 2 (two) times a day with meals.    nystatin (MYCOSTATIN) cream Apply 1 application topically 3 (three) times a day.    simethicone (MYLICON) 80 mg chewable tablet Take 80 mg by mouth 4 (four) times a day as needed for flatulence.    cetirizine (ZyrTEC) 10 mg tablet Take 10 mg by mouth daily.    ranitidine (ZANTAC) 150 mg tablet Take 150 mg by mouth nightly.    acetaminophen (TYLENOL) 325 mg tablet Take 650 mg by mouth 4 (four) times a day as needed.    hydrocortisone 1 % cream Apply 1 application topically 2 (two) times a day as needed.     ibuprofen (ADVIL,MOTRIN) 800 mg tablet Take 800 mg by mouth every 8 (eight) hours as needed for pain scale 1-3/10.    ALPRAZolam (XANAX) 0.25 mg tablet Take 0.25 mg by mouth nightly as needed for anxiety.    nystatin (MYCOSTATIN) 100,000 unit/mL suspension Take 4 mL (400,000 Units total) by mouth 4 (four) times a day.    diltiazem CD (CARDIZEM CD) 240 mg 24 hr capsule Take 1 capsule (240 mg total) by mouth daily.    albuterol 2.5 mg /3 mL nebulizer solution Take 3 mL (2.5 mg total) by nebulization every 6 (six) hours as needed for wheezing.    ergocalciferol (Vitamin D2) 1,250 mcg (50,000 unit) capsule Take 50,000 Units by mouth once a week Take on Thursday.     fluticasone (FLONASE) 50 mcg/actuation nasal spray Administer 1 spray into each nostril 2 (two) times a day. Not to exceed 2 sprays in each nostril daily.     alendronate (FOSAMAX) 70 mg tablet Take 70 mg by mouth every 7 (seven) days. Take on Thursday.     omeprazole (PriLOSEC) 20 mg capsule Take 40 mg by mouth daily.      montelukast (SINGULAIR) 10 mg tablet Take 10 mg by mouth nightly.      ascorbic acid, vitamin C, (VITAMIN C) 500 mg tablet Take 1,000 mg by mouth daily.      docusate sodium (COLACE) 100 mg capsule Take 100 mg by mouth 2 (two) times a day as needed. Takes after lunch and at bedtime     multivitamin (THERAGRAN) tablet tablet Take 1 tablet by mouth daily with lunch.         Review of Systems:  Review of Systems  Unable to obtain    Objective     Vital signs:  Vitals:    06/22/23 1635 06/22/23 1645 06/22/23 1700   BP: 129/79 118/81 110/63   BP Location: Left arm  Left arm   Patient Position: Supine  Supine   Pulse: 100 101 97   Resp: 16 16 16   Temp: 36.7 °C (98.1 °F)     TempSrc: Axillary     SpO2: 98% 98% 94%   Weight: 57.1 kg (125 lb 14.1 oz)         Exam:  Physical Exam  General:  intubated sedated  Cv: RRR, peripheral pulses palpable  Resp: No wheezes rhonchi heard diminished air entry bilaterally  Abd: soft, NTND  Ext: no edema,  warm  Neuro: Sedated intubated but moving all 4 extremities    Assessment/Plan       Denita Spring is a 63 y.o. female with end-stage COPD presented with hypercapnic respiratory failure requiring intubation to [facility] transferred for further care to [facility].  We will continue aggressive bronchodilator therapy as well as steroids.  No clear indication for antibiotics and she is allergic to several of them.  May consider using ceftriaxone alone.  GI DVT prophylaxis be provided she will need to be extubated as soon as possible as she may be difficult to wean for prolonged ventilator support no family available at this time to discuss..     Active Hospital Problems    Diagnosis Date Noted    *Acute respiratory failure (CMS/[facility]) 12/02/2017     Priority: 1 - High    COPD exacerbation (CMS/HCC) 12/02/2017     Priority: 1 - High    Acute on chronic respiratory failure with hypercapnia (CMS/HCC) 12/02/2017     Priority: 1 - High      Resolved Hospital Problems         ICU Liberation Bundle:   Pain/Analagosedation: CPOT< 2, NRS< 3   Agitation/Sedation: RASS 1 to -2;   Delirium interventions/precautions: CAM ICU monitoring   Early Mobility/Rehabilitation: PT/OT consulted- early mobility protocol, appreciate assistance.   Sleep: Monitoring nightly    MV: LPV, SBT daily yes  Patient height not recorded    Fluids: restrictive fluid strategy; L  Electrolytes: Replacement per protocol  Nutrition:       Dietary Orders   (From admission, onward)                 Start     Ordered    06/22/23 1637  NPO Diet  Diet effective now         06/22/23 1639                    Bowel Regimen: Senokot Colace    Blood transfusion(s): goal Hb > 7     Glycemic control: goal<180    Wound(s): None    VTE Prophylaxis: Lovenox  Stress Ulcer Prophylaxis: Pepcid; Indication:   VAP Prophylaxis: HOB 30, oral cares     Lines/Tubes:  Urinary Catheter Date: 6/22/2023  Justification: Strict I/O    CVC(s): None  Ett: Present  Other  lines/tubes: PIV    Code Status/Goals of Care: Full Code;     Disposition/Discharge Plan: To be determined    Critical Care Time: 60 minutes    BRONWYN BURR MD   Critical Care Medicine

## 2023-06-23 ENCOUNTER — APPOINTMENT (OUTPATIENT)
Dept: RADIOLOGY | Facility: HOSPITAL | Age: 63
DRG: 208 | End: 2023-06-23
Payer: COMMERCIAL

## 2023-06-23 LAB
ALBUMIN SERPL-MCNC: 4 G/DL (ref 3.5–5.3)
ALP SERPL-CCNC: 91 U/L (ref 50–130)
ALT SERPL-CCNC: 85 U/L (ref 7–52)
ANION GAP SERPL CALC-SCNC: 13 MMOL/L (ref 3–11)
AST SERPL-CCNC: 72 U/L
BASE EXCESS BLDA CALC-SCNC: -2 MMOL/L (ref -2–2)
BASE EXCESS BLDA CALC-SCNC: 1.3 MMOL/L (ref -2–2)
BILIRUB SERPL-MCNC: 0.3 MG/DL (ref 0.2–1.4)
BUN SERPL-MCNC: 13 MG/DL (ref 7–25)
CALCIUM ALBUM COR SERPL-MCNC: 9.4 MG/DL (ref 8.6–10.3)
CALCIUM SERPL-MCNC: 9.4 MG/DL (ref 8.6–10.3)
CHLORIDE SERPL-SCNC: 107 MMOL/L (ref 98–107)
CO2 BLDA-SCNC: 22.7 MMOL/L (ref 19–24)
CO2 BLDA-SCNC: 25.7 MMOL/L (ref 19–24)
CO2 SERPL-SCNC: 23 MMOL/L (ref 21–32)
CREAT SERPL-MCNC: 0.66 MG/DL (ref 0.6–1.1)
DEVICE (RT): ABNORMAL
DEVICE (RT): ABNORMAL
FIO2: 30 %
FIO2: 40 %
FLOW: 40 L/M
GFR SERPL CREATININE-BSD FRML MDRD: 100 ML/MIN/1.73M*2
GLUCOSE BLDC GLUCOMTR-SCNC: 115 MG/DL (ref 70–105)
GLUCOSE BLDC GLUCOMTR-SCNC: 129 MG/DL (ref 70–105)
GLUCOSE BLDC GLUCOMTR-SCNC: 130 MG/DL (ref 70–105)
GLUCOSE SERPL-MCNC: 137 MG/DL (ref 70–105)
HCO3 BLDA-SCNC: 24 MMOL/L (ref 23–29)
HCO3 BLDA-SCNC: 27.3 MMOL/L (ref 23–29)
MODE (RT): ABNORMAL
MV (RT): 6.49 L/MIN
P/F RATIO: 203 %
P/F RATIO: 208 %
PATIENT POSITION: ABNORMAL
PATIENT POSITION: ABNORMAL
PCO2 BLDA: 45.5 MMHG (ref 35–45)
PCO2 BLDA: 49 MMHG (ref 35–45)
PEEP SET (RT): 8 CM/H2O
PH BLDA: 7.33 PH (ref 7.35–7.45)
PH BLDA: 7.36 PH (ref 7.35–7.45)
PIP OBSERVED (RT): 21 CM/H2O
PO2 BLDA: 61 MMHG (ref 60–80)
PO2 BLDA: 83.2 MMHG (ref 60–80)
POCT HEMOGLOBIN (MEASURED), ARTERIAL: 10.4 G/DL (ref 11.5–15.5)
POCT HEMOGLOBIN (MEASURED), ARTERIAL: 10.6 G/DL (ref 11.5–15.5)
POCT OXYHEMOGLOBIN, ARTERIAL: 88.2 %
POCT OXYHEMOGLOBIN, ARTERIAL: 93.8 %
POTASSIUM SERPL-SCNC: 4.7 MMOL/L (ref 3.5–5.1)
PROT SERPL-MCNC: 6.2 G/DL (ref 6–8.3)
RR SET (RT): 22 B/MIN
RR TOTAL (RT): 22 B/MIN
SODIUM SERPL-SCNC: 143 MMOL/L (ref 135–145)
SPO2 (RT): 92 %
SPO2 (RT): 94 %
VT OBSERVED (RT): 280 ML
VT SET (RT): 310 ML

## 2023-06-23 PROCEDURE — (BLANK) HC ROOM ICU MEDICAL

## 2023-06-23 PROCEDURE — 94640 AIRWAY INHALATION TREATMENT: CPT

## 2023-06-23 PROCEDURE — 2500000200 HC RX 250 WO HCPCS: Performed by: INTERNAL MEDICINE

## 2023-06-23 PROCEDURE — 94799 UNLISTED PULMONARY SVC/PX: CPT

## 2023-06-23 PROCEDURE — 94003 VENT MGMT INPAT SUBQ DAY: CPT

## 2023-06-23 PROCEDURE — 36415 COLL VENOUS BLD VENIPUNCTURE: CPT | Performed by: INTERNAL MEDICINE

## 2023-06-23 PROCEDURE — 6360000200 HC RX 636 W HCPCS (ALT 250 FOR IP): Performed by: INTERNAL MEDICINE

## 2023-06-23 PROCEDURE — 6370000100 HC RX 637 (ALT 250 FOR IP): Performed by: INTERNAL MEDICINE

## 2023-06-23 PROCEDURE — 71045 X-RAY EXAM CHEST 1 VIEW: CPT

## 2023-06-23 PROCEDURE — 99291 CRITICAL CARE FIRST HOUR: CPT | Mod: NC | Performed by: NURSE PRACTITIONER

## 2023-06-23 PROCEDURE — 6360000200 HC RX 636 W HCPCS (ALT 250 FOR IP): Performed by: NURSE PRACTITIONER

## 2023-06-23 PROCEDURE — 2580000300 HC RX 258: Performed by: INTERNAL MEDICINE

## 2023-06-23 PROCEDURE — 36600 WITHDRAWAL OF ARTERIAL BLOOD: CPT

## 2023-06-23 PROCEDURE — 82947 ASSAY GLUCOSE BLOOD QUANT: CPT | Mod: QW

## 2023-06-23 PROCEDURE — 94644 CONT INHLJ TX 1ST HOUR: CPT

## 2023-06-23 PROCEDURE — 80053 COMPREHEN METABOLIC PANEL: CPT | Performed by: INTERNAL MEDICINE

## 2023-06-23 PROCEDURE — 82805 BLOOD GASES W/O2 SATURATION: CPT

## 2023-06-23 PROCEDURE — 99291 CRITICAL CARE FIRST HOUR: CPT | Performed by: INTERNAL MEDICINE

## 2023-06-23 PROCEDURE — 6370000100 HC RX 637 (ALT 250 FOR IP): Performed by: NURSE PRACTITIONER

## 2023-06-23 RX ORDER — BUTALBITAL, ACETAMINOPHEN AND CAFFEINE 50; 325; 40 MG/1; MG/1; MG/1
2 TABLET ORAL EVERY 4 HOURS PRN
Status: DISCONTINUED | OUTPATIENT
Start: 2023-06-23 | End: 2023-06-28 | Stop reason: HOSPADM

## 2023-06-23 RX ORDER — FLUTICASONE PROPIONATE 50 MCG
2 SPRAY, SUSPENSION (ML) NASAL 3 TIMES DAILY
Status: DISCONTINUED | OUTPATIENT
Start: 2023-06-23 | End: 2023-06-26

## 2023-06-23 RX ORDER — ALBUTEROL SULFATE 0.83 MG/ML
20 SOLUTION RESPIRATORY (INHALATION) ONCE
Status: COMPLETED | OUTPATIENT
Start: 2023-06-23 | End: 2023-06-23

## 2023-06-23 RX ORDER — LORATADINE 10 MG/1
10 TABLET ORAL DAILY
Status: DISCONTINUED | OUTPATIENT
Start: 2023-06-23 | End: 2023-06-28 | Stop reason: HOSPADM

## 2023-06-23 RX ORDER — FUROSEMIDE 10 MG/ML
20 INJECTION INTRAMUSCULAR; INTRAVENOUS ONCE
Status: COMPLETED | OUTPATIENT
Start: 2023-06-23 | End: 2023-06-23

## 2023-06-23 RX ORDER — TALC
6 POWDER (GRAM) TOPICAL NIGHTLY PRN
Status: DISCONTINUED | OUTPATIENT
Start: 2023-06-23 | End: 2023-06-26

## 2023-06-23 RX ORDER — DILTIAZEM HYDROCHLORIDE 180 MG/1
180 CAPSULE, COATED, EXTENDED RELEASE ORAL DAILY
Status: DISCONTINUED | OUTPATIENT
Start: 2023-06-23 | End: 2023-06-28

## 2023-06-23 RX ORDER — IPRATROPIUM BROMIDE AND ALBUTEROL SULFATE 2.5; .5 MG/3ML; MG/3ML
3 SOLUTION RESPIRATORY (INHALATION)
Status: DISCONTINUED | OUTPATIENT
Start: 2023-06-23 | End: 2023-06-25

## 2023-06-23 RX ADMIN — FUROSEMIDE 20 MG: 20 INJECTION, SOLUTION INTRAMUSCULAR; INTRAVENOUS at 21:04

## 2023-06-23 RX ADMIN — METHYLPREDNISOLONE SODIUM SUCCINATE 60 MG: 125 INJECTION, POWDER, FOR SOLUTION INTRAMUSCULAR; INTRAVENOUS at 17:42

## 2023-06-23 RX ADMIN — NOREPINEPHRINE BITARTRATE 10 MCG/MIN: 0.03 INJECTION, SOLUTION INTRAVENOUS at 03:00

## 2023-06-23 RX ADMIN — SODIUM CHLORIDE, POTASSIUM CHLORIDE, SODIUM LACTATE AND CALCIUM CHLORIDE 100 ML/HR: 600; 310; 30; 20 INJECTION, SOLUTION INTRAVENOUS at 00:22

## 2023-06-23 RX ADMIN — FORMOTEROL FUMARATE DIHYDRATE 20 MCG: 20 SOLUTION RESPIRATORY (INHALATION) at 07:36

## 2023-06-23 RX ADMIN — IPRATROPIUM BROMIDE AND ALBUTEROL SULFATE 3 ML: .5; 3 SOLUTION RESPIRATORY (INHALATION) at 15:45

## 2023-06-23 RX ADMIN — Medication 10 ML: at 21:03

## 2023-06-23 RX ADMIN — APIXABAN 2.5 MG: 2.5 TABLET, FILM COATED ORAL at 13:46

## 2023-06-23 RX ADMIN — APIXABAN 2.5 MG: 2.5 TABLET, FILM COATED ORAL at 21:04

## 2023-06-23 RX ADMIN — METHYLPREDNISOLONE SODIUM SUCCINATE 60 MG: 125 INJECTION, POWDER, FOR SOLUTION INTRAMUSCULAR; INTRAVENOUS at 23:46

## 2023-06-23 RX ADMIN — PROPOFOL 34 MCG/KG/MIN: 10 INJECTION, EMULSION INTRAVENOUS at 06:14

## 2023-06-23 RX ADMIN — METHYLPREDNISOLONE SODIUM SUCCINATE 60 MG: 125 INJECTION, POWDER, FOR SOLUTION INTRAMUSCULAR; INTRAVENOUS at 00:20

## 2023-06-23 RX ADMIN — SODIUM CHLORIDE, POTASSIUM CHLORIDE, SODIUM LACTATE AND CALCIUM CHLORIDE 100 ML/HR: 600; 310; 30; 20 INJECTION, SOLUTION INTRAVENOUS at 04:13

## 2023-06-23 RX ADMIN — ACETAMINOPHEN 650 MG: 325 TABLET ORAL at 09:07

## 2023-06-23 RX ADMIN — ETHYL ALCOHOL 1 APPLICATION: 62 SWAB TOPICAL at 21:03

## 2023-06-23 RX ADMIN — BUDESONIDE 1 MG: 1 SUSPENSION RESPIRATORY (INHALATION) at 07:36

## 2023-06-23 RX ADMIN — IPRATROPIUM BROMIDE AND ALBUTEROL SULFATE 3 ML: .5; 3 SOLUTION RESPIRATORY (INHALATION) at 11:03

## 2023-06-23 RX ADMIN — ETHYL ALCOHOL 1 APPLICATION: 62 SWAB TOPICAL at 07:58

## 2023-06-23 RX ADMIN — ACETAMINOPHEN 650 MG: 325 TABLET ORAL at 15:54

## 2023-06-23 RX ADMIN — ALBUTEROL SULFATE 20 MG: 2.5 SOLUTION RESPIRATORY (INHALATION) at 20:30

## 2023-06-23 RX ADMIN — SODIUM CHLORIDE, POTASSIUM CHLORIDE, SODIUM LACTATE AND CALCIUM CHLORIDE 100 ML/HR: 600; 310; 30; 20 INJECTION, SOLUTION INTRAVENOUS at 13:54

## 2023-06-23 RX ADMIN — FLUTICASONE PROPIONATE 2 SPRAY: 50 SPRAY, METERED NASAL at 21:03

## 2023-06-23 RX ADMIN — NOREPINEPHRINE BITARTRATE 10 MCG/MIN: 0.03 INJECTION, SOLUTION INTRAVENOUS at 02:59

## 2023-06-23 RX ADMIN — METHYLPREDNISOLONE SODIUM SUCCINATE 60 MG: 125 INJECTION, POWDER, FOR SOLUTION INTRAMUSCULAR; INTRAVENOUS at 12:28

## 2023-06-23 RX ADMIN — METHYLPREDNISOLONE SODIUM SUCCINATE 60 MG: 125 INJECTION, POWDER, FOR SOLUTION INTRAMUSCULAR; INTRAVENOUS at 06:15

## 2023-06-23 RX ADMIN — LORATADINE 10 MG: 10 TABLET ORAL at 21:12

## 2023-06-23 RX ADMIN — FAMOTIDINE 20 MG: 10 INJECTION INTRAVENOUS at 07:58

## 2023-06-23 RX ADMIN — FAMOTIDINE 20 MG: 10 INJECTION INTRAVENOUS at 21:04

## 2023-06-23 NOTE — MEDICATION HISTORY SPECIALIST NOTES
[facility] [id number]    CSN: [id number]  : [id number]    Patient unable to verify medications at time of interview.  Patient intubated  Med list updated per resources available at this time:  [place] med list        Dose changes:  Diltiazem 240 mg to 180 mg    Discrepancies:  Theophylline 200 mg  not picked up issued 23        Medication discontinued:  Ergocalciferol 50,000 unit older than 6 months old 2017  Hydrocortisone 1% cram older than 6 months 2018   mg older than 6 months old 2018  Metformin 500 mg  older than 6 months 2018  Nystatin 100,000 suspension older than 6 months 2018  Nystatin cream older than 6 months old 2018  Ranitidine 150 mg older than 6 months old 2018  Cetirizine 10 mg  older than 6 months old 2018  Alprazolam 0.25 mg last filled   Alendronate 70 mg  older than 6 months old   Actifed cold-allergy older than 6 months old     Medication added:  Apixaban 2.5 mg   Bacitracin oint  Cholecalciferol D3 1000 unit  Roflumilast 500 mg  Tiotropium bromide  respimat   Loratidine 10 mg  Wixela 500/50  [id number] mg  Carboxymethlcellulose 0.5% oph     **High Alert**  Apixaban (Eliquis)    Medication History Specialist will continue to follow-up for 3 days.   See  for medication resources.

## 2023-06-23 NOTE — PLAN OF CARE
Problem: Knowledge Deficit  Goal: Patient/family/caregiver demonstrates understanding of disease process, treatment plan, medications, and discharge instructions  Description: INTERVENTIONS:   1. Complete learning assessment and assess knowledge base  2. Provide teaching at level of understanding   3. Provide teaching via preferred learning methods  Outcome: Progressing     Problem: Urinary Incontinence  Goal: Perineal skin integrity is maintained or improved  Description: INTERVENTIONS:  1. Assess genitourinary system, perineal skin, labs (urinalysis), and history of incontinence to include past management, aggravating, and alleviating factors  2. Collaborate with interdisciplinary team including wound, ostomy, and continence nurse and initiate plans and interventions as needed  4. Consider urine containment device  5. Apply skin protectant   6. Develop skin care regimen  7. Provide privacy when changing patient's incontinence device to maintain their dignity  Outcome: Progressing     Problem: Safety Adult - Fall  Goal: Free from fall injury  Description: INTERVENTIONS:    Inpatient - Please reference Cares/Safety Flowsheet under Guillaume Fall Risk for interventions.  Pediatrics - Please reference Peds Daily Cares/Safety Flowsheet under Bernal Pediatric Fall Assessment Fall Bundle for interventions  LD/OB - Please reference OB Shift Screening Flowsheet under OB Fall Risk for interventions.  Outcome: Progressing

## 2023-06-23 NOTE — INTERDISCIPLINARY/THERAPY
NUTRITION ASSESSMENT:    MALNUTRITION:  Parameters for Malnutrition: Risk for malnutrition    Starting po post extubation, pt SOB so ? intake  No recent wt or diet history available-discuss with pt when feeding better    OTHER NUTRITION PROBLEMS:   Sev COPD - need goals of care discussion per ICU rounds provider    NUTRITION INTERVENTIONS:  Allow Regular diet as tolerated (no restrictions in order to encourage intake)      Discharge nutrition recommendations: pending progress during hospitalization.  _______________________________________________________________________________  NUTRITION ASSESSMENT/REASSESSMENT DATA    PERTINENT MEDICAL DIAGNOSIS/PROBLEMS:     Current Diagnosis:      acute respiratory failure, sev COPD exacerbation  PMH: COPD,  osteoporosis, wears dentures        NUTRITION PRESCRIPTION:  Total Energy Estimated Needs: 25 cals/kg ABW (57.1kg) = 1430 cals (may be up to 30 cals/kg ABW = 1700 cals w/sev COPD)  Total Protein Estimated Needs: 1.2g PRO/kg IBW = 63g  Total Fluid Estimated Needs: 30ml/kg ABW (57.1kg)=1715ml         Dietary Orders   (From admission, onward)                 Start     Ordered    06/23/23 1127  Diet Regular  Diet effective now        Question Answer Comment   Nutrition Therapy Protocol (Dietitian May Adjust Diet and Nourishments) Yes    Diet type Regular        06/23/23 1127                  MONITORING/EVALUATION:  Pertinent Info: ICU, extubated this am but very SOB so did not visit with pt today, on 6L O2 at home, has been evaluated for lung transplant in [place] but declined for unclear reasons, per ICU discussion need to go over goals of care w/pt&family  Neuro:  follows commands  Intake:   Average Percent Meals Eaten (%): 0 Avg %     GI:   no complaints, stool PTA  Wounds:    none  Edema (per nursing documentation):   none  Pertinent Meds:  Pepcid, Solu-Medrol, Senna, levophed (2mcg/min)  MIVF:    LR 100ml/hr    Labs:    Results from last 4 days   Lab Units 06/23/23  8402  "  POTASSIUM MMOL/L 4.7   CHLORIDE mmol/L 107   SODIUM mmol/L 143   BUN mg/dL 13   CREATININE mg/dL 0.66   CO2 mmol/L 23   ANION GAP mmol/L 13*   GLUCOSE mg/dL 137*   CALCIUM mg/dL 9.4   AST U/L 72*   ALT U/L 85*   ALK PHOS U/L 91   TOTAL PROTEIN g/dL 6.2   ALBUMIN g/dL 4.0   BILIRUBIN TOTAL mg/dL 0.30   EGFR mL/min/1.73m*2 100      ANTHROPOMETRICS:  Ht Readings from Last 3 Encounters:   06/23/23 1.6 m (5' 2.99\")   12/01/21 1.6 m (5' 3\")   09/05/19 1.6 m (5' 3\")     Weights (Current Encounter Only) (last 180 days)       Date/Time Weight    06/23/23 0000 58.2 kg (128 lb 4.9 oz)    06/22/23 1635 57.1 kg (125 lb 14.1 oz)          Wt Readings from Last 10 Encounters:   06/23/23 58.2 kg (128 lb 4.9 oz)   12/01/21 79.4 kg (175 lb)   09/05/19 77.1 kg (170 lb)   07/20/18 74.8 kg (165 lb)   03/08/18 79.4 kg (175 lb)   02/16/18 77.8 kg (171 lb 8.3 oz)   12/05/17 78.7 kg (173 lb 8 oz)   08/15/17 79.4 kg (175 lb)   06/28/17 79.6 kg (175 lb 6.4 oz)   02/17/17 65.8 kg (145 lb)     Admit Weight: 57.1 kg (125 lb 14.1 oz) 6/22/2023  Admit BMI (kg/m2): 22.5  IBW/kg (Calculated) Female: 52.4 kg  Weight Category: Acceptable  Wt Change:large wt drop from 12/2021 - no recent wt history available    ORAL/DENTAL STATUS:  Teeth: Intact       FOOD ALLERGIES:  none  Religion/CULTURAL REQUESTS:      None  Cultural Requests During Hospitalization: none  Spiritual Requests During Hospitalization: none    NUTRITION FOCUSED PHYSICAL EXAM (NFPE): N/A    "

## 2023-06-23 NOTE — NURSING END OF SHIFT
ICU Nursing Shift Summary:    Patient: Denita Spring  MRN: [id number]  : 1960, Age: 63 y.o.    Location: [address] [place] [address]    Admit date: 2023  Primary Care Provider: [facility]  Attending Provider: Michael Rivas MD    23    Nursing Goals    Clinical Goals for the Shift: Monitor and maintain respiratory and hemodynamics.  Promote comfort and safety    NShift Summary:    Major Event(s): none    Brief Narratives  Changes in VS and Rhythm: No   Changes in Exam: No     Significant Events & Communications to Providers (last 12 hours)       Last 5 Values       Row Name 23                   Provider Notification    Reason for Communication Evaluate  patient able to sit up and bend over with ETT in and almost pulled out x2 with in 25min.  Easily re-oriented.  Notified provider of Mitts placed with soft restraints.  -EE        Provider Name Cecil PARMAR  -EE                  User Key  (r) = Recorded By, (t) = Taken By, (c) = Cosigned By      Initials Name    Violeta Higuera RN                  Oxygen Usage (last 12 hours)       Last 5 Values       Row Name 23 1645 23 1700 23 1715 23 1718 23 1730       Oxygen Therapy    SpO2 98 % 94 % 95 % 95 % 96 %    O2 Delivery Interface -- Endotracheal tube -- -- --    FiO2 (%) 40 % 40 % 40 % 40 % 40 %    Vent Mode -- -- -- VC/AC --      Row Name 23 1745 23 1754 23 1800 23 1815 23 1900       Oxygen Therapy    SpO2 96 % 98 % 99 % 100 % 98 %    FiO2 (%) 40 % 40 % 40 % 40 % --      Row Name 23 1930 23 19323 2000 23 2100 23 2200       Oxygen Therapy    SpO2 98 % 98 % 97 % 99 % 98 %    O2 Delivery Interface -- -- Endotracheal tube Endotracheal tube Endotracheal tube    FiO2 (%) 40 % 40 % -- -- --    Vent Mode VC/AC -- -- -- --      Row Name 23 2300 23 2347 23 0000 23 0100 23 0200       Oxygen Therapy    SpO2 98 % 99 % 92 % 93  % 94 %    O2 Delivery Interface Endotracheal tube -- Endotracheal tube Endotracheal tube Endotracheal tube    FiO2 (%) -- 30 % -- -- --    Vent Mode -- VC/AC -- -- --      Row Name 06/23/23 0300 06/23/23 0310 06/23/23 0400             Oxygen Therapy    SpO2 94 % 91 % 93 %      O2 Delivery Interface Endotracheal tube -- Endotracheal tube      FiO2 (%) -- 30 % --      Vent Mode -- VC/AC --                    Mobility (last 12 hours)       Last 5 Values       Row Name 06/22/23 1700 06/22/23 1900 06/22/23 2000 06/22/23 2100 06/22/23 2200       Mobility    Patient Position Supine Supine Supine Supine Supine    Turning Every 2 hours;Pillow support Every 2 hours;Pillow support Every 2 hours;Pillow support Every 2 hours;Pillow support Every 2 hours;Pillow support      Row Name 06/22/23 2300 06/23/23 0000 06/23/23 0100 06/23/23 0200 06/23/23 0300       Mobility    Patient Position Supine Supine Supine Supine Supine    Turning Every 2 hours;Pillow support Every 2 hours;Pillow support Every 2 hours;Pillow support Every 2 hours;Pillow support Every 2 hours      Row Name 06/23/23 0400                   Mobility    Patient Position Supine        Turning Every 2 hours;Pillow support                      Urethral Catheter       Active Urethral Catheter       Name Placement date Placement time Site Days    Urethral Catheter 06/22/23  --  -- 1                  Active Lines       Active Central venous catheter / Peripherally inserted central catheter / Implantable Port / Hemodialysis catheter / Midline Catheter       Name Placement date Placement time Site Days    Midline Peripheral 06/22/23 Right Brachial 06/22/23  1725  Brachial  less than 1                  Infusing Medications   Medication Dose Last Rate    LR  100 mL/hr 100 mL/hr (06/23/23 0413)    norEPINEPHrine  0.5-30 mcg/min 9 mcg/min (06/23/23 0400)    propofol  5-50 mcg/kg/min 38 mcg/kg/min (06/23/23 0400)    fentaNYL (PF) 50 mcg/mL IV infusion orderable   mcg/hr          Current LOC: Intensive Care

## 2023-06-23 NOTE — PLAN OF CARE
Patient: Denita Spring  MRN: [id number]  : 1960, Age: 63 y.o.    Location: [address] [place] [address]    Nursing Goals  Clinical Goals for the Shift: Monitor and maintain respiratory and hemodynamics.  Promote comfort and safety     Problem: Knowledge Deficit  Goal: Patient/family/caregiver demonstrates understanding of disease process, treatment plan, medications, and discharge instructions  Description: INTERVENTIONS:   1. Complete learning assessment and assess knowledge base  2. Provide teaching at level of understanding   3. Provide teaching via preferred learning methods  Outcome: Progressing     Problem: Safety - Medical Restraint  Goal: Remains free of injury from restraints (Restraint for Interference with Medical Device)  Description: INTERVENTIONS:  1. Determine that other, less restrictive measures have been tried or would not be effective before applying the restraint  2. Evaluate the patient's condition at the time of restraint application  3. Inform patient/family regarding the reason for restraint  4. Monitor safety, psychosocial status, comfort, nutrition and hydration  5. Assess continued need for restraints  6. Identify and implement measures to help patient regain control  Outcome: Not Progressing     Problem: Potential for Compromised Skin Integrity  Goal: Skin Integrity is Maintained or Improved  Description: INTERVENTIONS:  1. Assess and monitor skin integrity  2. Collaborate with interdisciplinary team and initiate plans and interventions as needed  3. Alternate a full bath with partial baths for elderly   4. Monitor patient's hygiene practices   5. Collaborate with wound, ostomy, and continence nurse  Outcome: Not Progressing  Goal: Nutritional status is improving  Description: INTERVENTIONS:  1. Monitor and assess patient for malnutrition (ex- brittle hair, bruises, dry skin, pale skin and conjunctiva, muscle wasting, smooth red tongue, and disorientation)  2. Monitor patient's weight and  dietary intake as ordered or per policy  3. Determine patient's food preferences and provide high-protein, high-caloric foods as appropriate  4. Assist patient with eating   5. Allow adequate time for meals   6. Encourage patient to take dietary supplement as ordered   7. Collaborate with dietitian  8. Include patient/family/caregiver in decisions related to nutrition  Outcome: Not Progressing  Goal: MOBILITY IS MAINTAINED OR IMPROVED  Description: INTERVENTIONS  1. Collaborate with interdisciplinary team and initiate plan and interventions as ordered (PT/OT)  2. Encourage ambulation  3. Up to chair for meals  4. Monitor for signs of deconditioning  Outcome: Not Progressing     Problem: Urinary Incontinence  Goal: Perineal skin integrity is maintained or improved  Description: INTERVENTIONS:  1. Assess genitourinary system, perineal skin, labs (urinalysis), and history of incontinence to include past management, aggravating, and alleviating factors  2. Collaborate with interdisciplinary team including wound, ostomy, and continence nurse and initiate plans and interventions as needed  4. Consider urine containment device  5. Apply skin protectant   6. Develop skin care regimen  7. Provide privacy when changing patient's incontinence device to maintain their dignity  Outcome: Not Progressing     Problem: Safety Adult - Fall  Goal: Free from fall injury  Description: INTERVENTIONS:    Inpatient - Please reference Cares/Safety Flowsheet under Guillaume Fall Risk for interventions.  Pediatrics - Please reference Peds Daily Cares/Safety Flowsheet under Bernal Pediatric Fall Assessment Fall Bundle for interventions  LD/OB - Please reference OB Shift Screening Flowsheet under OB Fall Risk for interventions.  Outcome: Not Progressing

## 2023-06-23 NOTE — PROGRESS NOTES
Critical care progress note:    COLLEEN Spring is a 63 y.o. female history of severe COPD last seen in December 2021 by Chasity ANTUNEZ in pulmonary clinic presented to [facility] in acute respiratory distress with a pH of 7.17 PCO2 of 84 and PO2 of 178 requiring intubation and was transferred to [facility] for higher level of care.  Patient is sedated intubated at the time of evaluation.  The was also told and report that the patient became hypotensive at the time of intubation received 1 L of normal saline and was started on LR at 100 cc an.  Patient also given 125 mg Solu-Medrol at [facility] and albuterol [facility].  She was a 1 pack/day smoker quit 1999.  The last note says that the patient was on Brovana twice daily Spiriva as needed and Combivent which he uses about 4 times a day.  It is not clear whether these are the same medication she uses now.  She is also on's approximately 6 L of nasal cannula at home.  Chest x-ray at [facility] was unremarkable except for hyper ventilation.  While WBC count is elevated no clear evidence for sepsis patient did receive Levaquin and vancomycin at [facility].  For present will not continue antibiotics here.  Will continue Solu-Medrol Perforomist Pulmicort and albuterol as needed.  Patient has been evaluated in lung transplant status to [place] in [place] but was declined for unclear reasons.  PFT July 2015 showed an FVC of 1.37 and an FEV1 of 0.46 with a ratio of 34%    6/23/2023  Patient is awake and alert even while on 36 mics of propofol.  Sedation was turned off patient was placed on spontaneous breathing trial.  The patient was tachypneic with low tidal volumes and arteries.  RSBI  varied from 94 - 140 but given that she is end-stage COPD it was decided to extubate patient to BiPAP which she stated that she was intolerant and was placed on heated high flow nasal cannula maintaining saturations around 96%.  Tachypneic at rest unable  to speak in complete sentences..  Had a goals of discussion conversation with patient and patient's daughter about an hour after extubation and she stated that she wants to be full code and wants everything done.  Medications are reviewed with pharmacist and it appears that she was on Brovana till 2022 and 10 being stopped by her clinic.  She is on Wixela Spiriva albuterol nebulizers Combivent inhaler.  She has previously been on theophylline and Daliresp both of which she was not compliant with  Medical History:  Past Medical History:   Diagnosis Date    Arthritis     Asthma     Cancer (CMS/[facility])     cervical CA    COPD (chronic obstructive pulmonary disease) (CMS/[facility])     History of transfusion     Obesity (BMI 30.0-34.9) 2018    Osteoporosis     Pneumonia     Wears dentures     .    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       Social History:  Social History     Socioeconomic History    Marital status:      Spouse name: Not on file    Number of children: Not on file    Years of education: Not on file    Highest education level: Not on file   Occupational History    Not on file   Tobacco Use    Smoking status: Former     Packs/day: 1.00     Types: Cigarettes     Quit date:      Years since quittin.4    Smokeless tobacco: Former   Substance and Sexual Activity    Alcohol use: No    Drug use: No    Sexual activity: Defer   Other Topics Concern    Not on file   Social History Narrative     Not on file     Social Determinants of Health     Financial Resource Strain: Not on file   Food Insecurity: Not on file   Transportation Needs: Not on file   Physical Activity: Not on file   Stress: Not on file   Social Connections: Not on file   Intimate Partner Violence: Not on file   Housing Stability: Not on file       Family History:   family history includes Cataracts in her father; Diabetes in her mother; Heart disease in her brother; Hypertension in her father; Other in her father.    Allergies:  Allergies   Allergen Reactions    Cefuroxime Axetil     Diphenhydramine Hcl      Patient denies    Erythromycin Lactobionate     Haloperidol      Other reaction(s): Unknown/Not Verified    Methylphenidate      ON FIRE    Metoclopramide      dizzy, sweating    Propoxyphene     Pseudoephedrine Other (see comments)     Patient reports this makes her drowsy, vision issue, gi issues, tremors    Tramadol      PARALYSIS    Codeine      oxygen gets really low    Latex      DRY MOUTH       Medications:   Medications Prior to Admission   Medication Sig    apixaban (ELIQUIS) 2.5 mg tablet Take 1 tablet (2.5 mg total) by mouth 2 (two) times a day    bacitracin 500 unit/gram ointment Apply 1 Application topically 2 (two) times a day    cholecalciferol, vitamin D3, (cholecalciferol) 25 mcg (1,000 unit) tablet Take 1 tablet (1,000 Units total) by mouth daily    dilTIAZem (TIAZAC) 180 mg 24 hr capsule Take 1 capsule (180 mg total) by mouth daily    roflumilast (DALIRESP) 500 mcg tablet Take 1 tablet (500 mcg total) by mouth daily    tiotropium bromide (SPIRIVA RESPIMAT) 2.5 mcg/actuation mist Inhale 2 puffs daily    loratadine (CLARITIN) 10 mg tablet Take 1 tablet (10 mg total) by mouth daily    fluticasone propion-salmeteroL (Wixela Inhub) 500-50 mcg/dose diskus inhaler Inhale 1 puff 2 (two) times a day Rinse mouth with water after use. Do not swallow.    albuterol HFA (Proventil HFA) 90 mcg/actuation inhaler Inhale 2 puffs every  6 (six) hours as needed for wheezing or shortness of breath    fexofenadine (ALLEGRA) 180 mg tablet Take 1 tablet (180 mg total) by mouth daily    carboxymethylcellulose-glycern (REFRESH OPTIVE) 1-0.9 % drops,gel Administer 1 drop into both eyes 4 (four) times a day as needed for dry eyes    ipratropium-albuteroL (COMBIVENT RESPIMAT)  mcg/actuation inhaler Inhale 1 puff 4 (four) times a day    tiotropium (Spiriva with HandiHaler) 1 capsule = per inhalation capsule Place 1 capsule (18 mcg total) into inhaler and inhale every morning    theophylline (THEODUR) 200 mg 12 hr tablet Take 1 tablet (200 mg total) by mouth 2 (two) times a day    calcium carbonate EX (TUMS EX) 300 mg (750 mg) chewable tablet Take 1 tablet (750 mg total) by mouth every 2 (two) hours as needed for heartburn    methyl salicylate-menthol 15-10 % cream See administration instructions Apply a sufficient quantity to the affected area four times a day as directed for muscle or joint pain. **wash hands after use**    sodium chloride (OCEAN) 0.65 % nasal spray Administer 1-2 mL (1-2 sprays total) into each nostril 4 (four) times a day    ferrous sulfate 325 mg (65 mg iron) tablet Take 1 tablet (325 mg total) by mouth daily    omega-3 fatty acids-fish oil 340-1,000 mg capsule Take 1,000 mg by mouth daily.    guaiFENesin (MUCINEX) 600 mg 12 hr tablet Take 1 tablet (600 mg total) by mouth 3 (three) times a day as needed for cough    simethicone (MYLICON) 80 mg chewable tablet Take 1 tablet (80 mg total) by mouth 4 (four) times a day as needed for flatulence    acetaminophen (TYLENOL) 325 mg tablet Take 2 tablets (650 mg total) by mouth 4 (four) times a day as needed    albuterol 2.5 mg /3 mL nebulizer solution Take 3 mL (2.5 mg total) by nebulization every 6 (six) hours as needed for wheezing.    fluticasone (FLONASE) 50 mcg/actuation nasal spray Administer 1 spray into each nostril 2 (two) times a day Not to exceed 2 sprays in each nostril daily.     omeprazole (PriLOSEC) 20 mg capsule Take 2 capsules (40 mg total) by mouth daily    montelukast (SINGULAIR) 10 mg tablet Take 1 tablet (10 mg total) by mouth nightly    ascorbic acid, vitamin C, (VITAMIN C) 500 mg tablet Take 1 tablet (500 mg total) by mouth 2 (two) times a day    docusate sodium (COLACE) 100 mg capsule Take 100 mg by mouth 2 (two) times a day as needed. Takes after lunch and at bedtime     multivitamin (THERAGRAN) tablet tablet Take 1 tablet by mouth daily with lunch.         Review of Systems:  Review of Systems  Unable to obtain    Objective     Vital signs:  Vitals:    06/23/23 1000 06/23/23 1100 06/23/23 1104 06/23/23 1109   BP: 117/62 113/63     BP Location:       Patient Position:  Sitting     Pulse: 115 112 118 119   Resp: (!) 41 23 24 24   Temp:       TempSrc:       SpO2: 96% 94% 92%    Weight:       Height:           Exam:  Physical Exam  General:  intubated sedated  Cv: RRR, peripheral pulses palpable  Resp: No wheezes rhonchi heard diminished air entry bilaterally  Abd: soft, NTND  Ext: no edema, warm  Neuro: Sedated intubated but moving all 4 extremities    Assessment/Plan       Denita Spring is a 63 y.o. female with end-stage COPD presented with hypercapnic respiratory failure requiring intubation to [facility] transferred for further care to [facility].   She was placed on spontaneous breathing trial and extubated given the weaning parameters not ideal given that she has end-stage COPD we may run into problems of weaning her making her vent dependent and proceeded to tracheostomy and placement.  So far she has tolerated extubation though she is tachypneic maintaining oxygen saturations not hypercapnic on ABG.  High risk for reintubation which she wants to do in case she needs it  Active Hospital Problems    Diagnosis Date Noted    *Acute respiratory failure (CMS/[facility]) 12/02/2017     Priority: 1 - High    COPD exacerbation (CMS/[facility]) 12/02/2017     Priority: 1 -  High    Acute on chronic respiratory failure with hypercapnia (CMS/HCC) 12/02/2017     Priority: 1 - High      Resolved Hospital Problems         ICU Liberation Bundle:   Pain/Analagosedation: CPOT< 2, NRS< 3   Agitation/Sedation: RASS 1 to -2;   Delirium interventions/precautions: CAM ICU monitoring   Early Mobility/Rehabilitation: PT/OT consulted- early mobility protocol, appreciate assistance.   Sleep: Monitoring nightly    MV: LPV, SBT daily yes  Ideal body weight: 52.4 kg (115 lb 7.7 oz)  Adjusted ideal body weight: 54.7 kg (120 lb 9.8 oz)    Fluids: restrictive fluid strategy; L  Electrolytes: Replacement per protocol  Nutrition:       Dietary Orders   (From admission, onward)                 Start     Ordered    06/23/23 1127  Diet Regular  Diet effective now        Question Answer Comment   Nutrition Therapy Protocol (Dietitian May Adjust Diet and Nourishments) Yes    Diet type Regular        06/23/23 1127                    Bowel Regimen: Senokot Colace    Blood transfusion(s): goal Hb > 7     Glycemic control: goal<180    Wound(s): None    VTE Prophylaxis: Lovenox  Stress Ulcer Prophylaxis: Pepcid; Indication:   VAP Prophylaxis: HOB 30, oral cares     Lines/Tubes:  Urinary Catheter Date: 6/22/2023  Justification: Strict I/O    CVC(s): None  Ett: Present  Other lines/tubes: PIV    Code Status/Goals of Care: Full Code;     Disposition/Discharge Plan: To be determined    Critical Care Time: 45 minutes    BRONWYN BURR MD   Critical Care Medicine

## 2023-06-23 NOTE — INTERDISCIPLINARY/THERAPY
06/23/23 0941   Subjective Comments   Subjective Comments Attempted PT evaluation at 0850 and 0941. Patient initially declines while she is long sitting in the bed, stating that she was recently extubated and would like to rest. On second attempt, patient reports headache and declines mobility today. PT will continue to follow and treat as able

## 2023-06-23 NOTE — MEDICATION HISTORY SPECIALIST NOTES
[facility] [phone]    Follow up on previous unable to assess patient. Patient initially updated by med history specialist with information available at that time.    Verified allergies.    Patient remains intubated with no family present.

## 2023-06-23 NOTE — INTERDISCIPLINARY/THERAPY
Two attempts made to see patient for early mobility evaluation this date. Upon initial attempt, patient very SOB after recently being extubated and declined activity. Upon 2nd attempt, patient reports she has a headache and declined out of bed activity. OT will continue to attempt evaluation as able and appropriate.

## 2023-06-23 NOTE — INTERDISCIPLINARY/THERAPY
Case Management Admission Note    Phone # [phone]    Living Situation: Family members Private residence            ADLs: Needs Assistance  Stairs: No    HME/CPAP: wc    Oxygen: Yes   Home Baseline Oxygen Flow Rate (L/min): -3 (L/min)  Home Oxygen Use Frequency: Continuous  Oxygen Vendor: [employer]  [facility]:No     Current Resources: Department of [facility]      Diabetes/supplies: Do you have Diabetes?: No  PCP: [facility]  Funding: [id number]  Pharmacy:[facility] - [address] [place]    Source of Information: Family Interview  Family Member's Name: Hetal  Support Person: Primary Emergency Contact: Adrien Chavarria, Home Phone: [phone], Relation: Son     Needs transportation assistance at DC: No     Discharge Needs/Barriers:    Narrative: visit to pt et daughter Hetal for above. Pt is mostly wc bound. Family assists and someone is always with her. She has a 10L O2 concentrator et self titrates generally using 5-8L. Pt would like to complete advance directives when she is feeling better  Dispo: Home

## 2023-06-24 LAB
ANION GAP SERPL CALC-SCNC: 10 MMOL/L (ref 3–11)
ANION GAP SERPL CALC-SCNC: 13 MMOL/L (ref 3–11)
BUN SERPL-MCNC: 14 MG/DL (ref 7–25)
BUN SERPL-MCNC: 15 MG/DL (ref 7–25)
BUN/CREAT SERPL: 28.85 RATIO
CA-I BLD-SCNC: 1.21 MMOL/L (ref 1.15–1.33)
CALCIUM SERPL-MCNC: 9.7 MG/DL (ref 8.6–10.3)
CALCIUM SERPL-MCNC: 9.8 MG/DL (ref 8.6–10.3)
CHLORIDE SERPL-SCNC: 102 MMOL/L (ref 98–107)
CHLORIDE SERPL-SCNC: 102 MMOL/L (ref 98–107)
CO2 SERPL-SCNC: 30 MMOL/L (ref 21–32)
CO2 SERPL-SCNC: 34 MMOL/L (ref 21–32)
CREAT SERPL-MCNC: 0.47 MG/DL (ref 0.6–1.1)
CREAT SERPL-MCNC: 0.52 MG/DL (ref 0.6–1.1)
ERYTHROCYTE [DISTWIDTH] IN BLOOD BY AUTOMATED COUNT: 15.4 % (ref 11.5–14)
GFR SERPL CREATININE-BSD FRML MDRD: 106 ML/MIN/1.73M*2
GFR SERPL CREATININE-BSD FRML MDRD: 108 ML/MIN/1.73M*2
GLUCOSE BLDC GLUCOMTR-SCNC: 106 MG/DL (ref 70–105)
GLUCOSE BLDC GLUCOMTR-SCNC: 153 MG/DL (ref 70–105)
GLUCOSE BLDC GLUCOMTR-SCNC: 99 MG/DL (ref 70–105)
GLUCOSE SERPL-MCNC: 103 MG/DL (ref 70–105)
GLUCOSE SERPL-MCNC: 105 MG/DL (ref 70–105)
HCT VFR BLD AUTO: 31.4 % (ref 34–45)
HGB BLD-MCNC: 10.5 G/DL (ref 11.5–15.5)
MAGNESIUM SERPL-MCNC: 1.9 MG/DL (ref 1.8–2.4)
MCH RBC QN AUTO: 28.7 PG (ref 28–33)
MCHC RBC AUTO-ENTMCNC: 33.5 G/DL (ref 32–36)
MCV RBC AUTO: 85.6 FL (ref 81–97)
PLATELET # BLD AUTO: 235 10*3/UL (ref 140–350)
PMV BLD AUTO: 7.6 FL (ref 6.9–10.8)
POTASSIUM SERPL-SCNC: 4 MMOL/L (ref 3.5–5.1)
POTASSIUM SERPL-SCNC: 4.3 MMOL/L (ref 3.5–5.1)
RBC # BLD AUTO: 3.67 10*6/ΜL (ref 3.7–5.3)
SODIUM SERPL-SCNC: 145 MMOL/L (ref 135–145)
SODIUM SERPL-SCNC: 146 MMOL/L (ref 135–145)
WBC # BLD AUTO: 10.5 10*3/UL (ref 4.5–10.5)

## 2023-06-24 PROCEDURE — 6370000100 HC RX 637 (ALT 250 FOR IP): Performed by: INTERNAL MEDICINE

## 2023-06-24 PROCEDURE — 2590000100 HC RX 259: Performed by: INTERNAL MEDICINE

## 2023-06-24 PROCEDURE — 6360000200 HC RX 636 W HCPCS (ALT 250 FOR IP): Performed by: INTERNAL MEDICINE

## 2023-06-24 PROCEDURE — 6370000100 HC RX 637 (ALT 250 FOR IP): Performed by: NURSE PRACTITIONER

## 2023-06-24 PROCEDURE — 94640 AIRWAY INHALATION TREATMENT: CPT

## 2023-06-24 PROCEDURE — 82310 ASSAY OF CALCIUM: CPT | Performed by: NURSE PRACTITIONER

## 2023-06-24 PROCEDURE — 36415 COLL VENOUS BLD VENIPUNCTURE: CPT | Performed by: NURSE PRACTITIONER

## 2023-06-24 PROCEDURE — 99232 SBSQ HOSP IP/OBS MODERATE 35: CPT | Performed by: INTERNAL MEDICINE

## 2023-06-24 PROCEDURE — 82947 ASSAY GLUCOSE BLOOD QUANT: CPT | Mod: QW

## 2023-06-24 PROCEDURE — 51702 INSERT TEMP BLADDER CATH: CPT

## 2023-06-24 PROCEDURE — 94799 UNLISTED PULMONARY SVC/PX: CPT

## 2023-06-24 PROCEDURE — 85027 COMPLETE CBC AUTOMATED: CPT | Performed by: NURSE PRACTITIONER

## 2023-06-24 PROCEDURE — (BLANK) HC ROOM ICU MEDICAL

## 2023-06-24 PROCEDURE — 83735 ASSAY OF MAGNESIUM: CPT | Performed by: NURSE PRACTITIONER

## 2023-06-24 PROCEDURE — 82374 ASSAY BLOOD CARBON DIOXIDE: CPT | Performed by: NURSE PRACTITIONER

## 2023-06-24 PROCEDURE — 6360000200 HC RX 636 W HCPCS (ALT 250 FOR IP)

## 2023-06-24 RX ORDER — MORPHINE SULFATE 2 MG/ML
1 INJECTION, SOLUTION INTRAMUSCULAR; INTRAVENOUS ONCE
Status: COMPLETED | OUTPATIENT
Start: 2023-06-24 | End: 2023-06-24

## 2023-06-24 RX ORDER — MORPHINE SULFATE 2 MG/ML
INJECTION, SOLUTION INTRAMUSCULAR; INTRAVENOUS
Status: COMPLETED
Start: 2023-06-24 | End: 2023-06-24

## 2023-06-24 RX ADMIN — METHYLPREDNISOLONE SODIUM SUCCINATE 60 MG: 125 INJECTION, POWDER, FOR SOLUTION INTRAMUSCULAR; INTRAVENOUS at 05:11

## 2023-06-24 RX ADMIN — FLUTICASONE PROPIONATE 2 SPRAY: 50 SPRAY, METERED NASAL at 23:08

## 2023-06-24 RX ADMIN — IPRATROPIUM BROMIDE AND ALBUTEROL SULFATE 3 ML: .5; 3 SOLUTION RESPIRATORY (INHALATION) at 07:22

## 2023-06-24 RX ADMIN — BUTALBITAL, ACETAMINOPHEN, AND CAFFEINE 2 TABLET: 50; 325; 40 TABLET ORAL at 19:26

## 2023-06-24 RX ADMIN — METHYLPREDNISOLONE SODIUM SUCCINATE 60 MG: 125 INJECTION, POWDER, FOR SOLUTION INTRAMUSCULAR; INTRAVENOUS at 13:37

## 2023-06-24 RX ADMIN — APIXABAN 2.5 MG: 2.5 TABLET, FILM COATED ORAL at 07:48

## 2023-06-24 RX ADMIN — FLUTICASONE PROPIONATE 2 SPRAY: 50 SPRAY, METERED NASAL at 15:00

## 2023-06-24 RX ADMIN — FAMOTIDINE 20 MG: 10 INJECTION INTRAVENOUS at 23:08

## 2023-06-24 RX ADMIN — LORATADINE 10 MG: 10 TABLET ORAL at 07:48

## 2023-06-24 RX ADMIN — IPRATROPIUM BROMIDE AND ALBUTEROL SULFATE 3 ML: .5; 3 SOLUTION RESPIRATORY (INHALATION) at 23:15

## 2023-06-24 RX ADMIN — MORPHINE SULFATE 1 MG: 2 INJECTION, SOLUTION INTRAMUSCULAR; INTRAVENOUS at 13:38

## 2023-06-24 RX ADMIN — METHYLPREDNISOLONE SODIUM SUCCINATE 40 MG: 40 INJECTION, POWDER, FOR SOLUTION INTRAMUSCULAR; INTRAVENOUS at 23:07

## 2023-06-24 RX ADMIN — SENNOSIDES 17.2 MG: 8.6 TABLET, FILM COATED ORAL at 23:07

## 2023-06-24 RX ADMIN — IPRATROPIUM BROMIDE AND ALBUTEROL SULFATE 3 ML: .5; 3 SOLUTION RESPIRATORY (INHALATION) at 15:19

## 2023-06-24 RX ADMIN — ETHYL ALCOHOL 1 APPLICATION: 62 SWAB TOPICAL at 23:07

## 2023-06-24 RX ADMIN — FAMOTIDINE 20 MG: 10 INJECTION INTRAVENOUS at 09:00

## 2023-06-24 RX ADMIN — IPRATROPIUM BROMIDE AND ALBUTEROL SULFATE 3 ML: .5; 3 SOLUTION RESPIRATORY (INHALATION) at 19:51

## 2023-06-24 RX ADMIN — BUTALBITAL, ACETAMINOPHEN, AND CAFFEINE 2 TABLET: 50; 325; 40 TABLET ORAL at 02:22

## 2023-06-24 RX ADMIN — FLUTICASONE PROPIONATE 2 SPRAY: 50 SPRAY, METERED NASAL at 09:00

## 2023-06-24 RX ADMIN — IPRATROPIUM BROMIDE AND ALBUTEROL SULFATE 3 ML: .5; 3 SOLUTION RESPIRATORY (INHALATION) at 02:31

## 2023-06-24 RX ADMIN — BUTALBITAL, ACETAMINOPHEN, AND CAFFEINE 2 TABLET: 50; 325; 40 TABLET ORAL at 07:48

## 2023-06-24 RX ADMIN — ETHYL ALCOHOL 1 APPLICATION: 62 SWAB TOPICAL at 09:00

## 2023-06-24 RX ADMIN — Medication 6 MG: at 23:07

## 2023-06-24 RX ADMIN — IPRATROPIUM BROMIDE AND ALBUTEROL SULFATE 3 ML: .5; 3 SOLUTION RESPIRATORY (INHALATION) at 11:00

## 2023-06-24 RX ADMIN — APIXABAN 2.5 MG: 2.5 TABLET, FILM COATED ORAL at 23:07

## 2023-06-24 NOTE — PLAN OF CARE
Patient: Denita Spring  MRN: [id number]  : 1960, Age: 63 y.o.    Location: [address] [place] [address]    Nursing Goals  Clinical Goals for the Shift: Pain management, monitor vs,lab,io. Ensure safety and comfort. Rest     Problem: Urinary Incontinence  Goal: Perineal skin integrity is maintained or improved  Description: INTERVENTIONS:  1. Assess genitourinary system, perineal skin, labs (urinalysis), and history of incontinence to include past management, aggravating, and alleviating factors  2. Collaborate with interdisciplinary team including wound, ostomy, and continence nurse and initiate plans and interventions as needed  4. Consider urine containment device  5. Apply skin protectant   6. Develop skin care regimen  7. Provide privacy when changing patient's incontinence device to maintain their dignity  Outcome: Progressing     Problem: Safety Adult - Fall  Goal: Free from fall injury  Description: INTERVENTIONS:    Inpatient - Please reference Cares/Safety Flowsheet under Guillaume Fall Risk for interventions.  Pediatrics - Please reference Peds Daily Cares/Safety Flowsheet under [facility] Pediatric Fall Assessment Fall Bundle for interventions  LD/OB - Please reference OB Shift Screening Flowsheet under OB Fall Risk for interventions.  2023 0249 by Violeta Pettit, RN  Outcome: Progressing  2023 0247 by Violeta Pettit, RN  Outcome: Not Progressing     Problem: Cardiovascular - Adult  Goal: Maintains optimal cardiac output and hemodynamic stability  Description: INTERVENTIONS:  1. Monitor vital signs and rhythm  2. Monitor for hypotension and other signs of decreased cardiac output  3. Administer and titrate ordered vasoactive medications to optimize hemodynamic stability  4. Monitor for fluid overload/dehydration, weight gain, shortness of breath and activity intolerance  5. Monitor arterial and/or venous puncture sites for bleeding and/or hematoma  6. Assess quality of pulses, capillary refill,  edema, sensation, skin color and temperature  7. Assess for signs of decreased coronary artery perfusion - ex. angina  Outcome: Progressing     Problem: Genitourinary - Adult  Goal: Urinary catheter remains patent  Description: INTERVENTIONS:  1. Assess patency of urinary catheter.  2. Irrigate catheter per order if indicated and notify provider if unable to irrigate.  3. Assess need for a larger catheter size or a 3-way catheter for continuous bladder irrigation.  Outcome: Progressing  Goal: Absence of Urinary Infection  Description: INTERVENTIONS:  1. Assess urinary status for burning, urgency, frequency, and pain  2. Assess urine for color, cloudiness, odor, and amount  3. Monitor intake/output  4. Monitor lab values  5. Obtain urinalysis as ordered  6. Assess for neurological status changes    Outcome: Progressing     Problem: Metabolic/Fluid and Electrolytes - Adult  Goal: Maintain Optimal Renal Function and Hemodynamic Stability  Description: INTERVENTIONS:  1. Monitor labs and assess for signs and symptoms of volume excess or deficit  2. Monitor intake, output and patient weight  3. Monitor urine specific gravity, serum osmolarity and serum sodium as indicated or ordered  4. Monitor response to interventions for patient's volume status, including labs, urine output, blood pressure (other measures as available)  5. Encourage oral intake as appropriate  6. Instruct patient on fluid and nutrition restrictions as appropriate  Outcome: Progressing     Problem: Safety - Medical Restraint  Goal: Remains free of injury from restraints (Restraint for Interference with Medical Device)  Description: INTERVENTIONS:  1. Determine that other, less restrictive measures have been tried or would not be effective before applying the restraint  2. Evaluate the patient's condition at the time of restraint application  3. Inform patient/family regarding the reason for restraint  4. Monitor safety, psychosocial status, comfort,  nutrition and hydration  5. Assess continued need for restraints  6. Identify and implement measures to help patient regain control  Outcome: Completed

## 2023-06-24 NOTE — CROSS COVER NOTE
Nurse messaged me to come to bedside, saying the pt was complaining of a headache and ear pain. Upon entering the room, the patient was in respiratory distress. She had labored breathing and was using accessory muscles. SpO2 was in the lower 80s on high flow nasal cannula 40% FiO2 and 40L. Lungs auscultated, there were expiratory wheezes with very diminished lung sounds. Chest xray ordered, which revealed hyperinflated lungs with bilateral cephalization and bilateral perihilar vascular congestion. Patient given hour long albuterol treatment with improvement in oxygenation and work of breathing. Patient verbally confirmed she was feeling better.     Examination of left ear canal performed with otoscope, but exam was unremarkable. Likely ear pain due to patient laying on her side to aid in labored breathing, as she was directly on her affected ear    Assessment/Plan    Respiratory Distress likely r/t underlying lung disease and mild pulmonary edema  -Continuous nebulization of 20 mg albuterol given over 1 hour   -Diurese with Lasix 20 mg   -Continue High flow nasal cannula, wean as appropriate to maintain SPO2 >90%     Severe Headache with Sinus Pressure  Ear pain  -One time dose of Fioricet for headache  -Started on home Flonase, Claritin and Allegra for sinus pressure/pain    Karlee Renee CNP      I spent a total of 40 non consecutive minutes of critical care time, exclusive of time spent on any procedures, in evaluating and managing this critically ill patient.

## 2023-06-24 NOTE — NURSING END OF SHIFT
ICU Nursing Shift Summary:    Patient: Denita Spring  MRN: [id number]  : 1960, Age: 63 y.o.    Location: [address] [place] [address]    Admit date: 2023  Primary Care Provider: [facility]  Attending Provider: Michael Rivas MD    23    Nursing Goals    Clinical Goals for the Shift: Pain management, monitor vs,lab,io. Ensure safety and comfort. Rest    Narrative Summary of Progress Towards Clinical Goals:  Patient would c/o headache and sinus pain.  Beginning of the shift it was the left ear.  Once evaluated by care practitioners the ear pain was resolved but still would c/o headache late in the shift.  Patient does not seem to really understand her disease process with her lungs and all the complications that come with it.       Shift Summary:    Major Event(s): respiratory distress due to headache     Brief Narratives  Changes in VS and Rhythm: Yes - st to sr     Changes in Exam: No     Significant Events & Communications to Providers (last 12 hours)       Last 5 Values       Row Name 23                   Provider Notification    Reason for Communication Evaluate  Notified ACNP of patient c/o extreme headache and left ear pain.  RR elevated, BP elevated.  -EE        Provider Name Karlee Guillen [facility]  -EE                  User Key  (r) = Recorded By, (t) = Taken By, (c) = Cosigned By      Initials Name    Violeta Higuera, RN                  Oxygen Usage (last 12 hours)       Last 5 Values       Row Name 23 1500 23 1546 23 1600 23 1700 23 1800       Oxygen Therapy    SpO2 94 % 90 % 91 % 93 % 94 %    O2 Delivery Interface High flow nasal cannula High flow nasal cannula High flow nasal cannula -- High flow nasal cannula    O2 Flow Rate (L/min) 40 L/min 40 L/min 40 L/min -- 40 L/min    FiO2 (%) 35 % 35 % 40 % 35 % 35 %    Vent Mode -- -- -- -- VC/AC    Change in O2 Flow Rate 0 0 0 -- 0      Row Name 23 1900 23  06/23/23 2100       Oxygen Therapy    SpO2 94 % 91 % 96 % 96 % 96 %    O2 Delivery Interface High flow nasal cannula High flow nasal cannula High flow nasal cannula High flow nasal cannula High flow nasal cannula    O2 Flow Rate (L/min) 40 L/min -- 50 L/min -- --    FiO2 (%) 35 % -- 40 % -- --    SpO2 Alarm On? -- -- -- Yes --    Change in O2 Flow Rate 0 -- 10 -- --      Row Name 06/23/23 2200 06/23/23 2230 06/23/23 2300 06/23/23 2328 06/24/23 0000       Oxygen Therapy    SpO2 96 % 95 % 93 % 94 % 97 %    O2 Delivery Interface High flow nasal cannula High flow nasal cannula High flow nasal cannula High flow nasal cannula High flow nasal cannula    O2 Flow Rate (L/min) -- -- -- 50 L/min --    FiO2 (%) -- -- -- 40 % --    Change in O2 Flow Rate -- -- -- 0 --      Row Name 06/24/23 0100 06/24/23 0200 06/24/23 0231 06/24/23 0236          Oxygen Therapy    SpO2 96 % 94 % 92 % 93 %     O2 Delivery Interface High flow nasal cannula High flow nasal cannula -- --     O2 Flow Rate (L/min) -- 40 L/min -- --     FiO2 (%) -- 40 % 40 % 40 %     Change in O2 Flow Rate -- -10 -- --                   Mobility (last 12 hours)       Last 5 Values       Row Name 06/23/23 1600 06/23/23 1900 06/23/23 2000 06/23/23 2030 06/23/23 2100       Mobility    Patient Position Sitting Supine Supine Supine Supine    Turning Turns self Right lateral;Turns self Right lateral;Turns self Right lateral;Turns self Right lateral;Turns self      Row Name 06/23/23 2200 06/23/23 2230 06/23/23 2300 06/24/23 0000 06/24/23 0100       Mobility    Patient Position Supine Supine Supine Supine Supine    Turning Right lateral;Turns self Right lateral;Every 2 hours Right lateral;Turns self Right lateral;Turns self Right lateral;Turns self      Row Name 06/24/23 0200                   Mobility    Patient Position Supine        Turning Right lateral;Turns self                      Urethral Catheter       Active Urethral Catheter       Name Placement date Placement time  Site Days    Urethral Catheter 06/22/23  --  -- 2                  Active Lines       Active Central venous catheter / Peripherally inserted central catheter / Implantable Port / Hemodialysis catheter / Midline Catheter       Name Placement date Placement time Site Days    Midline Peripheral 06/22/23 Right Brachial 06/22/23  1725  Brachial  1                  Infusing Medications   Medication Dose Last Rate       Current LOC: Intensive Care

## 2023-06-24 NOTE — MEDICATION HISTORY SPECIALIST NOTES
[facility] [phone]    Follow up on previous unable to assess patient. Patient initially updated by med history specialist with information available at that time.    Interviewed patient in [place] who confirmed medications.

## 2023-06-24 NOTE — NURSING END OF SHIFT
ICU Nursing Shift Summary:    Patient: Denita Spring  MRN: [id number]  : 1960, Age: 63 y.o.    Location: [address] [place] [address]    Admit date: 2023  Primary Care Provider: [facility]  Attending Provider: Michael Rivas MD    23    Nursing Goals    Clinical Goals for the Shift: Pain management, monitor vs,lab,io. Ensure safety and comfort. Rest    Narrative Summary of Progress Towards Clinical Goals:  Patient has been extubated this morning. Currently on high flow nasal cannula on 40L/min with fiO2 of 35%. She is still anxious at times, wanting to turn up and/or down her oxygen. Blood pressure currently stable, levo has been stopped this morning. Patient is still very tachypneic when awake. Pain medication has been given for headache with very little to no relief. ABG has been ordered. Restraints has been discontinued. [place] still as patient is very tachypneic. She is kept safe and is free from fall.     Nursing Concerns/Thoughts for MD:  Yes - medical management    New Patient or Family Concerns/Issues:  No    Shift Summary:    Major Event(s): none    Brief Narratives  Changes in VS and Rhythm: No   Changes in Exam: No     Significant Events & Communications to Providers (last 12 hours)       Last 5 Values       Row Name 23 0912 23 1153                Provider Notification    Reason for Communication Review case  diet order  -SG Review case  pain meds  -SG       Provider Name Dr. Rob FERNÁNDEZ                 User Key  (r) = Recorded By, (t) = Taken By, (c) = Cosigned By      Initials Name    Dori Villanueva, RN                  Oxygen Usage (last 12 hours)       Last 5 Values       Row Name 23 0700 23 0737 23 0800 23 0833 23 0900       Oxygen Therapy    SpO2 94 % 91 % 92 % 91 % 92 %    O2 Delivery Interface -- -- Endotracheal tube High flow nasal cannula --    O2 Flow Rate (L/min) -- -- -- 40 L/min 40 L/min    FiO2 (%) 30 % 30 % 30 %  40 % 40 %    Vent Mode -- VC/AC VC/AC -- --    Change in O2 Flow Rate -- -- -- 40 0      Row Name 06/23/23 1000 06/23/23 1100 06/23/23 1104 06/23/23 1200 06/23/23 1300       Oxygen Therapy    SpO2 96 % 94 % 92 % 95 % 92 %    O2 Delivery Interface -- High flow nasal cannula High flow nasal cannula High flow nasal cannula --    O2 Flow Rate (L/min) 40 L/min 40 L/min 40 L/min 40 L/min 40 L/min    FiO2 (%) 40 % 40 % 40 % 40 % 40 %    Change in O2 Flow Rate 0 0 0 0 0      Row Name 06/23/23 1400 06/23/23 1500 06/23/23 1546 06/23/23 1600          Oxygen Therapy    SpO2 93 % 94 % 90 % 91 %     O2 Delivery Interface High flow nasal cannula High flow nasal cannula High flow nasal cannula High flow nasal cannula     O2 Flow Rate (L/min) 40 L/min 40 L/min 40 L/min 40 L/min     FiO2 (%) 40 % 35 % 35 % 40 %     Change in O2 Flow Rate 0 0 0 0                   Mobility (last 12 hours)       Last 5 Values       Row Name 06/23/23 0800 06/23/23 1100 06/23/23 1200 06/23/23 1600          Mobility    Activity Bedrest Chair position in bed Chair position in bed --     Level of Assistance Dependent, patient does less than 25% Minimal assist, patient does 75% or more Minimal assist, patient does 75% or more --     Patient Position Supine Sitting Sitting Sitting     Turning Every 2 hours;Right lateral Turns self Turns self Turns self                   Urethral Catheter       Active Urethral Catheter       Name Placement date Placement time Site Days    Urethral Catheter 06/22/23  --  -- 1                  Active Lines       Active Central venous catheter / Peripherally inserted central catheter / Implantable Port / Hemodialysis catheter / Midline Catheter       Name Placement date Placement time Site Days    Midline Peripheral 06/22/23 Right Brachial 06/22/23  1725  Brachial  1                  Infusing Medications   Medication Dose Last Rate    LR  100 mL/hr 100 mL/hr (06/23/23 1518)    norEPINEPHrine  0.5-30 mcg/min Stopped (06/23/23  1001)    propofol  5-50 mcg/kg/min Stopped (06/23/23 0746)    fentaNYL (PF) 50 mcg/mL IV infusion orderable   mcg/hr         Current LOC: Intensive Care

## 2023-06-24 NOTE — PLAN OF CARE
Problem: Knowledge Deficit  Goal: Patient/family/caregiver demonstrates understanding of disease process, treatment plan, medications, and discharge instructions  Description: INTERVENTIONS:   1. Complete learning assessment and assess knowledge base  2. Provide teaching at level of understanding   3. Provide teaching via preferred learning methods  Outcome: Progressing     Problem: Potential for Compromised Skin Integrity  Goal: Skin Integrity is Maintained or Improved  Description: INTERVENTIONS:  1. Assess and monitor skin integrity  2. Collaborate with interdisciplinary team and initiate plans and interventions as needed  3. Alternate a full bath with partial baths for elderly   4. Monitor patient's hygiene practices   5. Collaborate with wound, ostomy, and continence nurse  Outcome: Progressing  Goal: Nutritional status is improving  Description: INTERVENTIONS:  1. Monitor and assess patient for malnutrition (ex- brittle hair, bruises, dry skin, pale skin and conjunctiva, muscle wasting, smooth red tongue, and disorientation)  2. Monitor patient's weight and dietary intake as ordered or per policy  3. Determine patient's food preferences and provide high-protein, high-caloric foods as appropriate  4. Assist patient with eating   5. Allow adequate time for meals   6. Encourage patient to take dietary supplement as ordered   7. Collaborate with dietitian  8. Include patient/family/caregiver in decisions related to nutrition  Outcome: Progressing  Goal: MOBILITY IS MAINTAINED OR IMPROVED  Description: INTERVENTIONS  1. Collaborate with interdisciplinary team and initiate plan and interventions as ordered (PT/OT)  2. Encourage ambulation  3. Up to chair for meals  4. Monitor for signs of deconditioning  Outcome: Progressing     Problem: Urinary Incontinence  Goal: Perineal skin integrity is maintained or improved  Description: INTERVENTIONS:  1. Assess genitourinary system, perineal skin, labs (urinalysis), and  history of incontinence to include past management, aggravating, and alleviating factors  2. Collaborate with interdisciplinary team including wound, ostomy, and continence nurse and initiate plans and interventions as needed  4. Consider urine containment device  5. Apply skin protectant   6. Develop skin care regimen  7. Provide privacy when changing patient's incontinence device to maintain their dignity  Outcome: Progressing     Problem: Safety Adult - Fall  Goal: Free from fall injury  Description: INTERVENTIONS:    Inpatient - Please reference Cares/Safety Flowsheet under Guillaume Fall Risk for interventions.  Pediatrics - Please reference Peds Daily Cares/Safety Flowsheet under [facility] Pediatric Fall Assessment Fall Bundle for interventions  LD/OB - Please reference OB Shift Screening Flowsheet under OB Fall Risk for interventions.  Outcome: Progressing     Problem: Respiratory - Adult  Goal: Achieves optimal ventilation and oxygenation  Description: INTERVENTIONS:  1. Assess for changes in respiratory status  2. Assess for changes in mentation and behavior  3. Position to facilitate oxygenation and minimize respiratory effort  4. Oxygen supplementation based on oxygen saturation or ABGs  5. Assess patient's ability to cough effectively  6. Encourage broncho-pulmonary hygiene including cough, deep breathe  7. Assess the need for suctioning   8. Assess and instruct to report SOB or any respiratory difficulty  9. Respiratory Therapy support as indicated, including medications and treatment.  Outcome: Progressing     Problem: Pain - Adult  Goal: Verbalizes/displays adequate comfort level or baseline comfort level  Description: INTERVENTIONS:  1. Encourage patient to monitor pain and request interventions  2. Assess pain using the appropriate pain scale  3. Administer analgesics based on type and severity of pain and evaluate response  4. Educate/Implement non-pharmacological measures as appropriate and evaluate  response  5. Consider cultural, developmental and social influences on pain and pain management  6. Notify Provider if interventions unsuccessful or patient reports new pain  Outcome: Progressing     Problem: Cardiovascular - Adult  Goal: Maintains optimal cardiac output and hemodynamic stability  Description: INTERVENTIONS:  1. Monitor vital signs and rhythm  2. Monitor for hypotension and other signs of decreased cardiac output  3. Administer and titrate ordered vasoactive medications to optimize hemodynamic stability  4. Monitor for fluid overload/dehydration, weight gain, shortness of breath and activity intolerance  5. Monitor arterial and/or venous puncture sites for bleeding and/or hematoma  6. Assess quality of pulses, capillary refill, edema, sensation, skin color and temperature  7. Assess for signs of decreased coronary artery perfusion - ex. angina  Outcome: Progressing  Goal: Absence of cardiac dysrhythmias or at baseline  Description: INTERVENTIONS:  1. Continuous cardiac monitoring, monitor vital signs, obtain 12 lead EKG if indicated  2. Administer antiarrhythmic and heart rate control medications as ordered  3. Initiate emergency measures for life threatening arrhythmias  4. Monitor electrolytes and administer replacement therapy as ordered  Outcome: Progressing     Problem: Genitourinary - Adult  Goal: Urinary catheter remains patent  Description: INTERVENTIONS:  1. Assess patency of urinary catheter.  2. Irrigate catheter per order if indicated and notify provider if unable to irrigate.  3. Assess need for a larger catheter size or a 3-way catheter for continuous bladder irrigation.  Outcome: Progressing  Goal: Absence of Urinary Infection  Description: INTERVENTIONS:  1. Assess urinary status for burning, urgency, frequency, and pain  2. Assess urine for color, cloudiness, odor, and amount  3. Monitor intake/output  4. Monitor lab values  5. Obtain urinalysis as ordered  6. Assess for  neurological status changes    Outcome: Progressing     Problem: Metabolic/Fluid and Electrolytes - Adult  Goal: Maintain Optimal Renal Function and Hemodynamic Stability  Description: INTERVENTIONS:  1. Monitor labs and assess for signs and symptoms of volume excess or deficit  2. Monitor intake, output and patient weight  3. Monitor urine specific gravity, serum osmolarity and serum sodium as indicated or ordered  4. Monitor response to interventions for patient's volume status, including labs, urine output, blood pressure (other measures as available)  5. Encourage oral intake as appropriate  6. Instruct patient on fluid and nutrition restrictions as appropriate  Outcome: Progressing

## 2023-06-24 NOTE — PLAN OF CARE
Problem: Respiratory - Adult  Goal: Achieves optimal ventilation and oxygenation  Description: INTERVENTIONS:  1. Assess for changes in respiratory status  2. Assess for changes in mentation and behavior  3. Position to facilitate oxygenation and minimize respiratory effort  4. Oxygen supplementation based on oxygen saturation or ABGs  5. Assess patient's ability to cough effectively  6. Encourage broncho-pulmonary hygiene including cough, deep breathe  7. Assess the need for suctioning   8. Assess and instruct to report SOB or any respiratory difficulty  9. Respiratory Therapy support as indicated, including medications and treatment.  Outcome: Progressing      Pt advised below message.  Pt will take 5,000 IU and call with any other further concerns.  She is aware next ov is 7/9/2020    Pt had no further questions.

## 2023-06-24 NOTE — PROGRESS NOTES
Critical care progress note:    COLLEEN Spring is a 63 y.o. female history of severe COPD last seen in December 2021 by Chasity ANTUNEZ in pulmonary clinic presented to [facility] in acute respiratory distress with a pH of 7.17 PCO2 of 84 and PO2 of 178 requiring intubation and was transferred to [facility] for higher level of care.  Patient is sedated intubated at the time of evaluation.  The was also told and report that the patient became hypotensive at the time of intubation received 1 L of normal saline and was started on LR at 100 cc an.  Patient also given 125 mg Solu-Medrol at [facility] and albuterol [facility].  She was a 1 pack/day smoker quit 1999.  The last note says that the patient was on Brovana twice daily Spiriva as needed and Combivent which he uses about 4 times a day.  It is not clear whether these are the same medication she uses now.  She is also on's approximately 6 L of nasal cannula at home.  Chest x-ray at [facility] was unremarkable except for hyper ventilation.  While WBC count is elevated no clear evidence for sepsis patient did receive Levaquin and vancomycin at [facility].  For present will not continue antibiotics here.  Will continue Solu-Medrol Perforomist Pulmicort and albuterol as needed.  Patient has been evaluated in lung transplant status to [place] in [place] but was declined for unclear reasons.  PFT July 2015 showed an FVC of 1.37 and an FEV1 of 0.46 with a ratio of 34%    6/24/2023  Patient was extubated yesterday even though she did not meet all weaning criteria due to concerns that she would become vent dependent and may be unable to be weaned off the ventilator and need tracheostomy with a prolonged ventilator stay.  So far she has done relatively well but has been requiring heated high flow nasal cannula 40% 40 L/min as she is intolerant of BiPAP.  We will try to wean oxygen down.  She is not as tachypneic today.   She is on Wixela  Spiriva albuterol nebulizers Combivent inhaler at home but I do not think she is compliant with Wixela and Spiriva.      Medical History:  Past Medical History:   Diagnosis Date    Arthritis     Asthma     Cancer (CMS/[facility])     cervical CA    COPD (chronic obstructive pulmonary disease) (CMS/[facility])     History of transfusion     Obesity (BMI 30.0-34.9) 2018    Osteoporosis     Pneumonia     Wears dentures     .    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       Social History:  Social History     Socioeconomic History    Marital status:      Spouse name: Not on file    Number of children: Not on file    Years of education: Not on file    Highest education level: Not on file   Occupational History    Not on file   Tobacco Use    Smoking status: Former     Packs/day: 1.00     Types: Cigarettes     Quit date:      Years since quittin.4    Smokeless tobacco: Former   Substance and Sexual Activity    Alcohol use: No    Drug use: No    Sexual activity: Defer   Other Topics Concern    Not on file   Social History Narrative    Not on file     Social Determinants of Health     Financial Resource Strain: Not on file   Food Insecurity: Not on file   Transportation Needs: Not on file   Physical Activity: Not on file   Stress: Not on file   Social Connections: Not on file   Intimate Partner Violence: Not on file   Housing Stability: Not on file       Family History:   family history includes Cataracts in her  father; Diabetes in her mother; Heart disease in her brother; Hypertension in her father; Other in her father.    Allergies:  Allergies   Allergen Reactions    Cefuroxime Axetil     Diphenhydramine Hcl      Patient denies    Erythromycin Lactobionate     Haloperidol      Other reaction(s): Unknown/Not Verified    Methylphenidate      ON FIRE    Metoclopramide      dizzy, sweating    Propoxyphene     Pseudoephedrine Other (see comments)     Patient reports this makes her drowsy, vision issue, gi issues, tremors    Tramadol      PARALYSIS    Codeine      oxygen gets really low    Latex      DRY MOUTH       Medications:   Medications Prior to Admission   Medication Sig    apixaban (ELIQUIS) 2.5 mg tablet Take 1 tablet (2.5 mg total) by mouth 2 (two) times a day    bacitracin 500 unit/gram ointment Apply 1 Application topically 2 (two) times a day    cholecalciferol, vitamin D3, (cholecalciferol) 25 mcg (1,000 unit) tablet Take 1 tablet (1,000 Units total) by mouth daily    dilTIAZem (TIAZAC) 180 mg 24 hr capsule Take 1 capsule (180 mg total) by mouth daily    roflumilast (DALIRESP) 500 mcg tablet Take 1 tablet (500 mcg total) by mouth daily    tiotropium bromide (SPIRIVA RESPIMAT) 2.5 mcg/actuation mist Inhale 2 puffs daily    loratadine (CLARITIN) 10 mg tablet Take 1 tablet (10 mg total) by mouth daily    fluticasone propion-salmeteroL (Wixela Inhub) 500-50 mcg/dose diskus inhaler Inhale 1 puff 2 (two) times a day Rinse mouth with water after use. Do not swallow.    albuterol HFA (Proventil HFA) 90 mcg/actuation inhaler Inhale 2 puffs every 6 (six) hours as needed for wheezing or shortness of breath    fexofenadine (ALLEGRA) 180 mg tablet Take 1 tablet (180 mg total) by mouth daily    carboxymethylcellulose-glycern (REFRESH OPTIVE) 1-0.9 % drops,gel Administer 1 drop into both eyes 4 (four) times a day as needed for dry eyes    ipratropium-albuteroL (COMBIVENT RESPIMAT)  mcg/actuation inhaler Inhale 1 puff 4  (four) times a day    tiotropium (Spiriva with HandiHaler) 1 capsule = per inhalation capsule Place 1 capsule (18 mcg total) into inhaler and inhale every morning    theophylline (THEODUR) 200 mg 12 hr tablet Take 1 tablet (200 mg total) by mouth 2 (two) times a day    calcium carbonate EX (TUMS EX) 300 mg (750 mg) chewable tablet Take 1 tablet (750 mg total) by mouth every 2 (two) hours as needed for heartburn    methyl salicylate-menthol 15-10 % cream See administration instructions Apply a sufficient quantity to the affected area four times a day as directed for muscle or joint pain. **wash hands after use**    sodium chloride (OCEAN) 0.65 % nasal spray Administer 1-2 mL (1-2 sprays total) into each nostril 4 (four) times a day    ferrous sulfate 325 mg (65 mg iron) tablet Take 1 tablet (325 mg total) by mouth daily    omega-3 fatty acids-fish oil 340-1,000 mg capsule Take 1,000 mg by mouth daily.    guaiFENesin (MUCINEX) 600 mg 12 hr tablet Take 1 tablet (600 mg total) by mouth 3 (three) times a day as needed for cough    simethicone (MYLICON) 80 mg chewable tablet Take 1 tablet (80 mg total) by mouth 4 (four) times a day as needed for flatulence    acetaminophen (TYLENOL) 325 mg tablet Take 2 tablets (650 mg total) by mouth 4 (four) times a day as needed    albuterol 2.5 mg /3 mL nebulizer solution Take 3 mL (2.5 mg total) by nebulization every 6 (six) hours as needed for wheezing.    fluticasone (FLONASE) 50 mcg/actuation nasal spray Administer 1 spray into each nostril 2 (two) times a day Not to exceed 2 sprays in each nostril daily.    omeprazole (PriLOSEC) 20 mg capsule Take 2 capsules (40 mg total) by mouth daily    montelukast (SINGULAIR) 10 mg tablet Take 1 tablet (10 mg total) by mouth nightly    ascorbic acid, vitamin C, (VITAMIN C) 500 mg tablet Take 1 tablet (500 mg total) by mouth 2 (two) times a day    docusate sodium (COLACE) 100 mg capsule Take 100 mg by mouth 2 (two) times a day as needed. Takes  after lunch and at bedtime     multivitamin (THERAGRAN) tablet tablet Take 1 tablet by mouth daily with lunch.         Review of Systems:  Review of Systems  Unable to obtain    Objective     Vital signs:  Vitals:    06/24/23 1109 06/24/23 1200 06/24/23 1300 06/24/23 1400   BP:  109/66 104/62 109/60   BP Location:  Left arm Left arm    Patient Position:  Supine Supine    Pulse: 76 91 89 81   Resp: 24 22 24 22   Temp:  36.7 °C (98 °F)     TempSrc:  Oral     SpO2: 95% 94% 93% 96%   Weight:       Height:           Exam:  Physical Exam  General:  intubated sedated  Cv: RRR, peripheral pulses palpable  Resp: No wheezes rhonchi heard diminished air entry bilaterally  Abd: soft, NTND  Ext: no edema, warm  Neuro: Sedated intubated but moving all 4 extremities    Assessment/Plan       Denita Spring is a 63 y.o. female with end-stage COPD presented with hypercapnic respiratory failure requiring intubation to [facility] transferred for further care to [facility].   She was extubated to heated high flow nasal cannula at 40 L/min and 40% FiO2.  We will attempt to wean oxygen flow to 10 L/min.  She states that her baseline oxygen at home is 6 L/min .  She is a full code and will be reintubated if she fails this trial of extubation  Active Hospital Problems    Diagnosis Date Noted    *Acute respiratory failure (CMS/[facility]) 12/02/2017     Priority: 1 - High    COPD exacerbation (CMS/[facility]) 12/02/2017     Priority: 1 - High    Acute on chronic respiratory failure with hypercapnia (CMS/[facility]) 12/02/2017     Priority: 1 - High      Resolved Hospital Problems         ICU Liberation Bundle:   Pain/Analagosedation: CPOT< 2, NRS< 3   Agitation/Sedation: RASS 1 to -2;   Delirium interventions/precautions: CAM ICU monitoring   Early Mobility/Rehabilitation: PT/OT consulted- early mobility protocol, appreciate assistance.   Sleep: Monitoring nightly    MV: LPV, SBT daily yes  Ideal body weight: 52.4 kg (115 lb 7.7 oz)  Adjusted  ideal body weight: 53.9 kg (118 lb 14.7 oz)    Fluids: restrictive fluid strategy; L  Electrolytes: Replacement per protocol  Nutrition:       Dietary Orders   (From admission, onward)                 Start     Ordered    06/23/23 1127  Diet Regular  Diet effective now        Question Answer Comment   Nutrition Therapy Protocol (Dietitian May Adjust Diet and Nourishments) Yes    Diet type Regular        06/23/23 1127                    Bowel Regimen: Senokot Colace    Blood transfusion(s): goal Hb > 7     Glycemic control: goal<180    Wound(s): None    VTE Prophylaxis: Lovenox  Stress Ulcer Prophylaxis: Pepcid; Indication:   VAP Prophylaxis: HOB 30, oral cares     Lines/Tubes:  Urinary Catheter Date: 6/22/2023  Justification: Strict I/O    CVC(s): None  Ett: Present  Other lines/tubes: PIV    Code Status/Goals of Care: Full Code;     Disposition/Discharge Plan: To be determined    Critical Care Time: 45 minutes    BRONWYN BURR MD   Critical Care Medicine

## 2023-06-25 LAB — GLUCOSE BLDC GLUCOMTR-SCNC: 118 MG/DL (ref 70–105)

## 2023-06-25 PROCEDURE — 6370000100 HC RX 637 (ALT 250 FOR IP): Performed by: NURSE PRACTITIONER

## 2023-06-25 PROCEDURE — (BLANK) HC ROOM PRIVATE

## 2023-06-25 PROCEDURE — 2590000100 HC RX 259: Performed by: INTERNAL MEDICINE

## 2023-06-25 PROCEDURE — 99233 SBSQ HOSP IP/OBS HIGH 50: CPT | Performed by: INTERNAL MEDICINE

## 2023-06-25 PROCEDURE — 6370000100 HC RX 637 (ALT 250 FOR IP): Performed by: INTERNAL MEDICINE

## 2023-06-25 PROCEDURE — 94640 AIRWAY INHALATION TREATMENT: CPT

## 2023-06-25 PROCEDURE — 51702 INSERT TEMP BLADDER CATH: CPT

## 2023-06-25 PROCEDURE — 82947 ASSAY GLUCOSE BLOOD QUANT: CPT | Mod: QW

## 2023-06-25 PROCEDURE — 6360000200 HC RX 636 W HCPCS (ALT 250 FOR IP): Performed by: INTERNAL MEDICINE

## 2023-06-25 RX ORDER — LEVALBUTEROL INHALATION SOLUTION 1.25 MG/3ML
1.25 SOLUTION RESPIRATORY (INHALATION) 4 TIMES DAILY
Status: DISCONTINUED | OUTPATIENT
Start: 2023-06-25 | End: 2023-06-28 | Stop reason: HOSPADM

## 2023-06-25 RX ORDER — CALC/MAG/B COMPLEX/D3/HERB 61
15 TABLET ORAL
Status: DISCONTINUED | OUTPATIENT
Start: 2023-06-25 | End: 2023-06-28 | Stop reason: HOSPADM

## 2023-06-25 RX ORDER — LEVALBUTEROL INHALATION SOLUTION 1.25 MG/3ML
1.25 SOLUTION RESPIRATORY (INHALATION) EVERY 4 HOURS PRN
Status: DISCONTINUED | OUTPATIENT
Start: 2023-06-25 | End: 2023-06-28 | Stop reason: HOSPADM

## 2023-06-25 RX ORDER — ROFLUMILAST 500 UG/1
500 TABLET ORAL DAILY
Status: DISCONTINUED | OUTPATIENT
Start: 2023-06-25 | End: 2023-06-28 | Stop reason: HOSPADM

## 2023-06-25 RX ORDER — IPRATROPIUM BROMIDE AND ALBUTEROL SULFATE 2.5; .5 MG/3ML; MG/3ML
3 SOLUTION RESPIRATORY (INHALATION) 4 TIMES DAILY
Status: DISCONTINUED | OUTPATIENT
Start: 2023-06-25 | End: 2023-06-25

## 2023-06-25 RX ADMIN — FLUTICASONE PROPIONATE 2 SPRAY: 50 SPRAY, METERED NASAL at 08:04

## 2023-06-25 RX ADMIN — METHYLPREDNISOLONE SODIUM SUCCINATE 40 MG: 40 INJECTION, POWDER, FOR SOLUTION INTRAMUSCULAR; INTRAVENOUS at 06:03

## 2023-06-25 RX ADMIN — LORATADINE 10 MG: 10 TABLET ORAL at 08:04

## 2023-06-25 RX ADMIN — APIXABAN 2.5 MG: 2.5 TABLET, FILM COATED ORAL at 20:35

## 2023-06-25 RX ADMIN — APIXABAN 2.5 MG: 2.5 TABLET, FILM COATED ORAL at 08:05

## 2023-06-25 RX ADMIN — LANSOPRAZOLE 15 MG: 15 CAPSULE, DELAYED RELEASE ORAL at 18:02

## 2023-06-25 RX ADMIN — LEVALBUTEROL HYDROCHLORIDE 1.25 MG: 1.25 SOLUTION RESPIRATORY (INHALATION) at 23:11

## 2023-06-25 RX ADMIN — LEVALBUTEROL HYDROCHLORIDE 1.25 MG: 1.25 SOLUTION RESPIRATORY (INHALATION) at 19:16

## 2023-06-25 RX ADMIN — ACETAMINOPHEN 650 MG: 325 TABLET ORAL at 00:45

## 2023-06-25 RX ADMIN — FAMOTIDINE 20 MG: 10 INJECTION INTRAVENOUS at 08:05

## 2023-06-25 RX ADMIN — LEVALBUTEROL HYDROCHLORIDE 1.25 MG: 1.25 SOLUTION RESPIRATORY (INHALATION) at 15:47

## 2023-06-25 RX ADMIN — ETHYL ALCOHOL 1 APPLICATION: 62 SWAB TOPICAL at 20:35

## 2023-06-25 RX ADMIN — Medication 6 MG: at 20:35

## 2023-06-25 RX ADMIN — BUTALBITAL, ACETAMINOPHEN, AND CAFFEINE 2 TABLET: 50; 325; 40 TABLET ORAL at 15:44

## 2023-06-25 RX ADMIN — SENNOSIDES 17.2 MG: 8.6 TABLET, FILM COATED ORAL at 20:35

## 2023-06-25 RX ADMIN — FLUTICASONE PROPIONATE 2 SPRAY: 50 SPRAY, METERED NASAL at 20:35

## 2023-06-25 RX ADMIN — ETHYL ALCOHOL 1 APPLICATION: 62 SWAB TOPICAL at 08:05

## 2023-06-25 RX ADMIN — IPRATROPIUM BROMIDE AND ALBUTEROL SULFATE 3 ML: .5; 3 SOLUTION RESPIRATORY (INHALATION) at 08:20

## 2023-06-25 RX ADMIN — IPRATROPIUM BROMIDE AND ALBUTEROL SULFATE 3 ML: .5; 3 SOLUTION RESPIRATORY (INHALATION) at 12:08

## 2023-06-25 RX ADMIN — IPRATROPIUM BROMIDE AND ALBUTEROL SULFATE 3 ML: .5; 3 SOLUTION RESPIRATORY (INHALATION) at 02:36

## 2023-06-25 RX ADMIN — FLUTICASONE PROPIONATE 2 SPRAY: 50 SPRAY, METERED NASAL at 14:21

## 2023-06-25 RX ADMIN — ROFLUMILAST 500 MCG: 500 TABLET ORAL at 14:21

## 2023-06-25 NOTE — PROGRESS NOTES
Daily Progress Note    Chief complaint: copd exacerbation    Assessment/Plan   Acute copd exacerbation  Acute on chronic hypoxic hypercapnic resp failure, successfully extubated    Titrate oxygen per protocol, she is currently on 6 l oxymizer, baseline 6 L nc  Change steroid to methylprednisolone 40 mg/ day  Add stiolto daily, change neb to xopenex qid and q4h prn  Since she never had eosinophilia (dating back to 2017), steroid inhaler is likely unnecessary   Resume roflumilast, notably she has not been taking theophylline according to pharmacy review  Transfer to floor      Principal Problem:    Acute respiratory failure (CMS/HCC)  Active Problems:    Acute on chronic respiratory failure with hypercapnia (CMS/HCC)    COPD exacerbation (CMS/[facility])      Subjective   In mild to mod resp distress on oxymizer      Objective     Physical Exam:  General:  In resp distress  Neck:  Symmetric  Lung:  Markedly reduced bs but clear  Heart:  rrr  Ab:  Soft, non-tender, bs+  Ext:  Edema+-    Vital signs in last 24 hours:  Temp:  [36.6 °C (97.9 °F)-36.9 °C (98.4 °F)] 36.8 °C (98.2 °F)  Heart Rate:  [] 100  Resp:  [18-40] 22  SpO2:  [89 %-100 %] 98 %  BP: ()/(34-76) 104/67  FiO2 (%):  [35 %-40 %] 40 %  SpO2/FiO2 Ratio Using Measured FiO2 (%):  [222.5-285.7] 222.5  SpO2/FiO2 Ratio Using Approximate FiO2 (%):  [197.9-227.3] 222.7  Estimated P/F Ratio Using Approximate FiO2 (%):  [178.6-202.4] 198.7  Estimated P/F Ratio Using Measured FiO2 (%):  [198.5-249.7] 198.5    Intake/Output last 24h:  I/O last 3 completed shifts:  In: 324.4 [P.O.:100; I.V.:224.4]  Out: 2634 [Urine:2634]  Intake/Output last 8h:  I/O this shift:  In: 50 [P.O.:50]  Out: 210 [Urine:210]    Nutristionist consulted- appreciate assistance.    Malnutrition Assessment:  Type: Protein calorie  Parameters for Malnutrition: Risk for malnutrition       Evaluation:               DAXA REED MD

## 2023-06-25 NOTE — NURSING END OF SHIFT
ICU Nursing Shift Summary:    Patient: Denita Spring  MRN: [id number]  : 1960, Age: 63 y.o.    Location: [address] [place] [address]    Admit date: 2023  Primary Care Provider: Liliana Castillo  Attending Provider: Michael Rivas MD    23    Nursing Goals    Clinical Goals for the Shift: Monitor hemodynamics and respiratory integrity. Round with consulted teams as to nightly care plan. Promote safety and comfort while in ICU setting.    Narrative Summary of Progress Towards Clinical Goals:  N/a    Nursing Concerns/Thoughts for MD:  No    New Patient or Family Concerns/Issues:  No    Shift Summary:    Patient received from dayshift resting in bed without complaint. She slept until evening, medications were permitted to be given late in order to promote rest while in ICU environment. X1 dose of Tylenol and Fiorcet given for headache complaint. Heat therapy utilized as well in form of heat packs. She rested through early hours of the day with intermittent wakeful periods for her neb treatments. Afebrile over night, she states that she slept well overnight. No questions at this time.    Major Event(s): NAEON    Brief Narratives  Changes in VS and Rhythm: Reference flow sheet   Changes in Exam: No     Significant Events & Communications to Providers (last 12 hours)       Last 5 Values       Row Name 23 2330                   Provider Notification    Reason for Communication Critical result  [facility] ER staff ([place]) called and notified of 1 of 2 bottles being postivie for gram + cocci in clusters  -        Provider Name Karlee Guillen  -DANIELLE                  User Key  (r) = Recorded By, (t) = Taken By, (c) = Cosigned By      Initials Name    Sheila Bonilla RN                  Oxygen Usage (last 12 hours)       Last 5 Values       Row Name 23 1700 23 1800 23 1900 23 1930 23 195       Oxygen Therapy    SpO2 99 % 99 % 99 % 98 % 98 %    O2 Delivery Interface -- -- --  -- Oxymizer    O2 Flow Rate (L/min) -- -- -- -- 7 L/min    FiO2 (%) -- -- 40 % 40 % --    Change in O2 Flow Rate -- -- -- -- 0      Row Name 06/24/23 1957 06/24/23 2000 06/24/23 2030 06/24/23 2100 06/24/23 2130       Oxygen Therapy    SpO2 99 % 99 % 100 % 100 % 90 %    O2 Delivery Interface -- Oxymizer -- -- --    FiO2 (%) 40 % 40 % 40 % 40 % 40 %      Row Name 06/24/23 2200 06/24/23 2315 06/24/23 2320 06/25/23 0000 06/25/23 0235       Oxygen Therapy    SpO2 100 % 95 % 100 % -- 100 %    O2 Delivery Interface -- Oxymizer -- -- Oxymizer    O2 Flow Rate (L/min) -- 7 L/min -- -- 7 L/min    FiO2 (%) 40 % -- 40 % 40 % --    Change in O2 Flow Rate -- 0 -- -- 0      Row Name 06/25/23 0240 06/25/23 0400                Oxygen Therapy    SpO2 100 % --       FiO2 (%) 40 % 40 %                     Mobility (last 12 hours)       Last 5 Values    No documentation.                 Urethral Catheter       Active Urethral Catheter       Name Placement date Placement time Site Days    Urethral Catheter 06/22/23  --  -- 3                  Active Lines       Active Central venous catheter / Peripherally inserted central catheter / Implantable Port / Hemodialysis catheter / Midline Catheter       Name Placement date Placement time Site Days    Midline Peripheral 06/22/23 Right Brachial 06/22/23  1725  Brachial  2                  Infusing Medications   Medication Dose Last Rate       Current LOC: Intensive Care

## 2023-06-25 NOTE — PLAN OF CARE
Problem: Knowledge Deficit  Goal: Patient/family/caregiver demonstrates understanding of disease process, treatment plan, medications, and discharge instructions  Description: INTERVENTIONS:   1. Complete learning assessment and assess knowledge base  2. Provide teaching at level of understanding   3. Provide teaching via preferred learning methods  Outcome: Progressing     Problem: Potential for Compromised Skin Integrity  Goal: Skin Integrity is Maintained or Improved  Description: INTERVENTIONS:  1. Assess and monitor skin integrity  2. Collaborate with interdisciplinary team and initiate plans and interventions as needed  3. Alternate a full bath with partial baths for elderly   4. Monitor patient's hygiene practices   5. Collaborate with wound, ostomy, and continence nurse  Outcome: Progressing  Goal: Nutritional status is improving  Description: INTERVENTIONS:  1. Monitor and assess patient for malnutrition (ex- brittle hair, bruises, dry skin, pale skin and conjunctiva, muscle wasting, smooth red tongue, and disorientation)  2. Monitor patient's weight and dietary intake as ordered or per policy  3. Determine patient's food preferences and provide high-protein, high-caloric foods as appropriate  4. Assist patient with eating   5. Allow adequate time for meals   6. Encourage patient to take dietary supplement as ordered   7. Collaborate with dietitian  8. Include patient/family/caregiver in decisions related to nutrition  Outcome: Progressing  Goal: MOBILITY IS MAINTAINED OR IMPROVED  Description: INTERVENTIONS  1. Collaborate with interdisciplinary team and initiate plan and interventions as ordered (PT/OT)  2. Encourage ambulation  3. Up to chair for meals  4. Monitor for signs of deconditioning  Outcome: Progressing     Problem: Urinary Incontinence  Goal: Perineal skin integrity is maintained or improved  Description: INTERVENTIONS:  1. Assess genitourinary system, perineal skin, labs (urinalysis), and  history of incontinence to include past management, aggravating, and alleviating factors  2. Collaborate with interdisciplinary team including wound, ostomy, and continence nurse and initiate plans and interventions as needed  4. Consider urine containment device  5. Apply skin protectant   6. Develop skin care regimen  7. Provide privacy when changing patient's incontinence device to maintain their dignity  Outcome: Progressing     Problem: Safety Adult - Fall  Goal: Free from fall injury  Description: INTERVENTIONS:    Inpatient - Please reference Cares/Safety Flowsheet under Guillaume Fall Risk for interventions.  Pediatrics - Please reference Peds Daily Cares/Safety Flowsheet under Bernal Pediatric Fall Assessment Fall Bundle for interventions  LD/OB - Please reference OB Shift Screening Flowsheet under OB Fall Risk for interventions.  Outcome: Progressing     Problem: Respiratory - Adult  Goal: Achieves optimal ventilation and oxygenation  Description: INTERVENTIONS:  1. Assess for changes in respiratory status  2. Assess for changes in mentation and behavior  3. Position to facilitate oxygenation and minimize respiratory effort  4. Oxygen supplementation based on oxygen saturation or ABGs  5. Assess patient's ability to cough effectively  6. Encourage broncho-pulmonary hygiene including cough, deep breathe  7. Assess the need for suctioning   8. Assess and instruct to report SOB or any respiratory difficulty  9. Respiratory Therapy support as indicated, including medications and treatment.  Outcome: Progressing     Problem: Cardiovascular - Adult  Goal: Maintains optimal cardiac output and hemodynamic stability  Description: INTERVENTIONS:  1. Monitor vital signs and rhythm  2. Monitor for hypotension and other signs of decreased cardiac output  3. Administer and titrate ordered vasoactive medications to optimize hemodynamic stability  4. Monitor for fluid overload/dehydration, weight gain, shortness of breath  and activity intolerance  5. Monitor arterial and/or venous puncture sites for bleeding and/or hematoma  6. Assess quality of pulses, capillary refill, edema, sensation, skin color and temperature  7. Assess for signs of decreased coronary artery perfusion - ex. angina  Outcome: Progressing  Goal: Absence of cardiac dysrhythmias or at baseline  Description: INTERVENTIONS:  1. Continuous cardiac monitoring, monitor vital signs, obtain 12 lead EKG if indicated  2. Administer antiarrhythmic and heart rate control medications as ordered  3. Initiate emergency measures for life threatening arrhythmias  4. Monitor electrolytes and administer replacement therapy as ordered  Outcome: Progressing     Problem: Genitourinary - Adult  Goal: Urinary catheter remains patent  Description: INTERVENTIONS:  1. Assess patency of urinary catheter.  2. Irrigate catheter per order if indicated and notify provider if unable to irrigate.  3. Assess need for a larger catheter size or a 3-way catheter for continuous bladder irrigation.  Outcome: Progressing  Goal: Absence of Urinary Infection  Description: INTERVENTIONS:  1. Assess urinary status for burning, urgency, frequency, and pain  2. Assess urine for color, cloudiness, odor, and amount  3. Monitor intake/output  4. Monitor lab values  5. Obtain urinalysis as ordered  6. Assess for neurological status changes    Outcome: Progressing     Problem: Metabolic/Fluid and Electrolytes - Adult  Goal: Maintain Optimal Renal Function and Hemodynamic Stability  Description: INTERVENTIONS:  1. Monitor labs and assess for signs and symptoms of volume excess or deficit  2. Monitor intake, output and patient weight  3. Monitor urine specific gravity, serum osmolarity and serum sodium as indicated or ordered  4. Monitor response to interventions for patient's volume status, including labs, urine output, blood pressure (other measures as available)  5. Encourage oral intake as appropriate  6. Instruct  patient on fluid and nutrition restrictions as appropriate  Outcome: Progressing     Problem: Pain - Adult  Goal: Verbalizes/displays adequate comfort level or baseline comfort level  Description: INTERVENTIONS:  1. Encourage patient to monitor pain and request interventions  2. Assess pain using the appropriate pain scale  3. Administer analgesics based on type and severity of pain and evaluate response  4. Educate/Implement non-pharmacological measures as appropriate and evaluate response  5. Consider cultural, developmental and social influences on pain and pain management  6. Notify Provider if interventions unsuccessful or patient reports new pain  Outcome: Progressing

## 2023-06-26 PROCEDURE — (BLANK) HC ROOM SEMI PRIVATE

## 2023-06-26 PROCEDURE — 6360000200 HC RX 636 W HCPCS (ALT 250 FOR IP): Performed by: INTERNAL MEDICINE

## 2023-06-26 PROCEDURE — 6370000100 HC RX 637 (ALT 250 FOR IP): Performed by: INTERNAL MEDICINE

## 2023-06-26 PROCEDURE — 94640 AIRWAY INHALATION TREATMENT: CPT

## 2023-06-26 PROCEDURE — 97165 OT EVAL LOW COMPLEX 30 MIN: CPT | Mod: GO

## 2023-06-26 PROCEDURE — 6370000100 HC RX 637 (ALT 250 FOR IP): Performed by: NURSE PRACTITIONER

## 2023-06-26 PROCEDURE — 97530 THERAPEUTIC ACTIVITIES: CPT | Mod: GO

## 2023-06-26 PROCEDURE — 99233 SBSQ HOSP IP/OBS HIGH 50: CPT | Performed by: INTERNAL MEDICINE

## 2023-06-26 PROCEDURE — 97530 THERAPEUTIC ACTIVITIES: CPT | Mod: GP | Performed by: PHYSICAL THERAPIST

## 2023-06-26 RX ORDER — FLUTICASONE PROPIONATE 50 MCG
2 SPRAY, SUSPENSION (ML) NASAL DAILY
Status: DISCONTINUED | OUTPATIENT
Start: 2023-06-27 | End: 2023-06-28 | Stop reason: HOSPADM

## 2023-06-26 RX ORDER — PREDNISONE 20 MG/1
40 TABLET ORAL DAILY
Status: DISCONTINUED | OUTPATIENT
Start: 2023-06-27 | End: 2023-06-28 | Stop reason: HOSPADM

## 2023-06-26 RX ORDER — TALC
6 POWDER (GRAM) TOPICAL NIGHTLY
Status: DISCONTINUED | OUTPATIENT
Start: 2023-06-26 | End: 2023-06-28 | Stop reason: HOSPADM

## 2023-06-26 RX ADMIN — LEVALBUTEROL HYDROCHLORIDE 1.25 MG: 1.25 SOLUTION RESPIRATORY (INHALATION) at 19:19

## 2023-06-26 RX ADMIN — ROFLUMILAST 500 MCG: 500 TABLET ORAL at 09:09

## 2023-06-26 RX ADMIN — Medication 6 MG: at 21:54

## 2023-06-26 RX ADMIN — LANSOPRAZOLE 15 MG: 15 CAPSULE, DELAYED RELEASE ORAL at 06:09

## 2023-06-26 RX ADMIN — ALBUTEROL SULFATE 2.5 MG: 2.5 SOLUTION RESPIRATORY (INHALATION) at 21:38

## 2023-06-26 RX ADMIN — LORATADINE 10 MG: 10 TABLET ORAL at 09:09

## 2023-06-26 RX ADMIN — APIXABAN 2.5 MG: 2.5 TABLET, FILM COATED ORAL at 21:54

## 2023-06-26 RX ADMIN — TIOTROPIUM BROMIDE AND OLODATEROL 2 PUFF: 3.124; 2.736 SPRAY, METERED RESPIRATORY (INHALATION) at 08:34

## 2023-06-26 RX ADMIN — LEVALBUTEROL HYDROCHLORIDE 1.25 MG: 1.25 SOLUTION RESPIRATORY (INHALATION) at 07:19

## 2023-06-26 RX ADMIN — METHYLPREDNISOLONE SODIUM SUCCINATE 40 MG: 40 INJECTION, POWDER, FOR SOLUTION INTRAMUSCULAR; INTRAVENOUS at 09:09

## 2023-06-26 RX ADMIN — LANSOPRAZOLE 15 MG: 15 CAPSULE, DELAYED RELEASE ORAL at 17:23

## 2023-06-26 RX ADMIN — BUTALBITAL, ACETAMINOPHEN, AND CAFFEINE 2 TABLET: 50; 325; 40 TABLET ORAL at 17:23

## 2023-06-26 RX ADMIN — LEVALBUTEROL HYDROCHLORIDE 1.25 MG: 1.25 SOLUTION RESPIRATORY (INHALATION) at 15:12

## 2023-06-26 RX ADMIN — ETHYL ALCOHOL 1 APPLICATION: 62 SWAB TOPICAL at 21:56

## 2023-06-26 RX ADMIN — ETHYL ALCOHOL 1 APPLICATION: 62 SWAB TOPICAL at 09:10

## 2023-06-26 RX ADMIN — BUTALBITAL, ACETAMINOPHEN, AND CAFFEINE 2 TABLET: 50; 325; 40 TABLET ORAL at 06:08

## 2023-06-26 RX ADMIN — FLUTICASONE PROPIONATE 2 SPRAY: 50 SPRAY, METERED NASAL at 09:10

## 2023-06-26 RX ADMIN — APIXABAN 2.5 MG: 2.5 TABLET, FILM COATED ORAL at 09:09

## 2023-06-26 RX ADMIN — LEVALBUTEROL HYDROCHLORIDE 1.25 MG: 1.25 SOLUTION RESPIRATORY (INHALATION) at 11:28

## 2023-06-26 NOTE — NURSING END OF SHIFT
Nursing End of Shift Summary:    Patient: Denita Spring  MRN: [id number]  : 1960, Age: 63 y.o.    Location: [address] [place] [address]    Nursing Goals  Clinical Goals for the Shift: Pain management, monitor vs,lab,io. Monitor respiratory demand.Ensure safety and comfort. Rest    Narrative Summary of Progress Toward Clinical Goals:  N/A    Barriers to Goals/Nursing Concerns:  No    New Patient or Family Concerns/Issues:  No    Shift Summary:      Received patient reporrt at approx 1245 patient was anxious and c/o SOB, Patient clamed down with reassurance. Titrated to 6 l/m NC. Transfer orders in place. Fiorcet given for her headache with significant improvement. [place] dc'd per prior nurse, patient has voided, is incontinent of urine  Significant Events & Communications to Providers (last 12 hours)       Last 5 Values    No documentation.                 Oxygen Usage (last 12 hours)       Last 5 Values       Row Name 23 1548                   Room Air or Baseline Oxygen Trial by Nursing    Home Baseline Oxygen Flow Rate (L/min) 2 (L/min)        Home Oxygen Use Frequency Continuous        Is Patient on Room Air OR on the Same Amount of O2 as at Home? No        Are You Performing the QShift O2 Trial? No                      Mobility (last 12 hours)       Last 5 Values       Row Name 23 0700 23 1200                Mobility    Activity Chair position in bed --       Level of Assistance Minimal assist, patient does 75% or more --       Patient Position Supine --       Turning Turns self Turns self                     Urethral Catheter       Active Urethral Catheter       None                  Active Lines       Active Central venous catheter / Peripherally inserted central catheter / Implantable Port / Hemodialysis catheter / Midline Catheter       Name Placement date Placement time Site Days    Midline Peripheral 23 Right Brachial 23  1725  Brachial  3                  Infusing Medications    Medication Dose Last Rate     PRN Medications   Medication Dose Last Admin    levalbuterol (XOPENEX) nebulizer solution 1.25 mg orderable  1.25 mg 1.25 mg at 06/25/23 1547    sodium chloride  1 spray      butalbital-acetaminophen-caff  2 tablet 2 tablet at 06/25/23 1544    melatonin  6 mg 6 mg at 06/24/23 2307    sodium chloride  10 mL 10 mL at 06/23/23 2103    bisacodyL  10 mg      sodium phosphates  1 enema      acetaminophen  650 mg 650 mg at 06/25/23 0045    albuterol  2.5 mg

## 2023-06-26 NOTE — INTERDISCIPLINARY/THERAPY
[address] [facility] [id number]  [phone]           Occupational Therapy Initial Evaluation     Date of Service: (P) 06/26/23    Patient Name: Denita Spring  Referring Provider: BRONWYN BURR  Medicare or Medicaid note, kiara has been added.: Yes    Onset Date: 6/26/2023        HICN: [id number]    Medical Diagnosis:    Acute respiratory failure (CMS/[facility]) [J96.00]    Treatment Diagnoses:  Problem List: (P) Decreased ADL status, Decreased endurance, Decreased functional mobility, Decreased gross motor control, Decreased IADLs     Subjective     HISTORY OF CURRENT COMPLAINT:   Therapy Treatment Diagnosis: (P) Acute copd exacerbation       RN ok'd patient. Patient agreeable to therapy. Began and ended session in bed, all needs in reach and alarm on.    Co-treat: Yes, Co-treat utilized to provide for two skilled therapy needs with OT providing direction of care for functional ADL and instrumental activities of daily living tasks while PT providing cues, facilitation and assist for functional mobility.       Family/Caregiver Present: No      '    PRIOR LEVEL OF FUNCTION:   Level of [place]: (P) Needs assistance with ADLs, Needs assistance with homemaking  Lived With: (P) Daughter (and grandson)  (P) Wheelchair-manual    Type of Home: (P) House  Home Layout: (P) One level  Home Access: (P) Ramped entrance  Home Adaptive Equipment: (P) Wheelchair-manual  Home Living Comments: (P) Lives in [place]            Pain:  Pain Assessment Scale  Pain Scale: (P) 0-10  0-10 Pain Score: (P) 0 - No pain       Past Medical History:   Diagnosis Date    Arthritis     Asthma     Cancer (CMS/[facility])     cervical CA    COPD (chronic obstructive pulmonary disease) (CMS/[facility])     History of transfusion     Obesity (BMI 30.0-34.9) 2/14/2018    Osteoporosis     Pneumonia     Wears dentures          Current Facility-Administered Medications:     [START ON 6/27/2023] fluticasone propionate (FLONASE) 50 mcg/actuation nasal  spray 2 spray, 2 spray, Each Nostril, Daily, Freda Becerra MD    methylPREDNISolone sod suc(PF) (Solu-MEDROL) injection 40 mg, 40 mg, intravenous, q24h TASHA, Freda Becerra MD, 40 mg at 06/26/23 0909    lansoprazole (PREVACID) capsule 15 mg, 15 mg, oral, 2x daily before meals, Freda Becerra MD, 15 mg at 06/26/23 0609    roflumilast (DALIRESP) tablet 500 mcg, 500 mcg, oral, Daily, Freda Becerra MD, 500 mcg at 06/26/23 0909    tiotropium-olodateroL (STIOLTO RESPIMAT) 2.5-2.5 mcg/actuation inhaler 2 puff, 2 puff, inhalation, Daily, Freda Becerra MD, 2 puff at 06/26/23 0834    levalbuterol (XOPENEX) 1.25 mg/3 mL nebulizer solution 1.25 mg, 1.25 mg, nebulization, 4x daily, Freda Becerra MD, 1.25 mg at 06/26/23 1128    levalbuterol (XOPENEX) 1.25 mg/3 mL nebulizer solution 1.25 mg, 1.25 mg, nebulization, q4h PRN, Freda Becerra MD, 1.25 mg at 06/25/23 2311    sodium chloride (OCEAN) 0.65 % nasal spray 1 spray, 1 spray, Each Nostril, PRN, Freda Becerra MD    apixaban (ELIQUIS) tablet 2.5 mg, 2.5 mg, oral, 2x daily, Michael iRvas MD, 2.5 mg at 06/26/23 0909    butalbital-acetaminophen-caff (FIORICET, ESGIC) 2 tablet, 2 tablet, oral, q4h PRN, Karlee Rico, CNP, 2 tablet at 06/26/23 0608    melatonin tablet 6 mg, 6 mg, oral, Nightly PRN, Karlee Rico, CNP, 6 mg at 06/25/23 2035    [Held by provider] dilTIAZem CD/XR (CARDIZEM CD/DILACOR XR) 24 hr capsule 180 mg, 180 mg, oral, Daily, Karlee Rico CNP    loratadine (CLARITIN) tablet 10 mg, 10 mg, oral, Daily, Karlee Rico CNP, 10 mg at 06/26/23 0909    sodium chloride flush 10 mL, 10 mL, intravenous, PRN, Michael Rivas MD, 10 mL at 06/23/23 2103    senna (SENOKOT) tablet 17.2 mg, 17.2 mg, oral, Nightly, Michael Rivas MD, 17.2 mg at 06/25/23 2035    bisacodyL (DULCOLAX) suppository 10 mg, 10 mg, rectal, q6h PRN, Michael Rivas MD    sodium phosphates (FLEET) enema 1 enema, 1 enema, rectal, PRN, Michael Rivas MD    ethyl  alcohoL (Nozin Nasal Antiseptic POPSwab) ampule 1 Application, 1 Application, nasal, 2x daily, Michael Rivas MD, 1 Application at 06/26/23 0910    acetaminophen (TYLENOL) tablet 650 mg, 650 mg, oral, q6h PRN, Michael Rivas MD, 650 mg at 06/25/23 0045    albuterol 2.5 mg/3 mL nebulizer solution 2.5 mg, 2.5 mg, nebulization, q2h PRN, Michael Rivas MD    ALLERGIES:  Cefuroxime axetil, Diphenhydramine hcl, Erythromycin lactobionate, Haloperidol, Methylphenidate, Metoclopramide, Propoxyphene, Pseudoephedrine, Tramadol, Codeine, and Latex    CURRENT ASSISTIVE OR ADAPTIVE EQUIPMENT:  Wheelchair   Guillaume Fall Risk Score: 70  FALL RISK Interventions: Fall bundle     DIAGNOSTIC TESTING:  X-Ray   ACTIVITIES LIMITED BY PATIENT COMPLAINT: ADLs    PATIENT'S GOALS FOR THERAPY:   Return home          Objective   INITIAL TREATMENT:  FINDINGS:  Other Precautions: (P) fall, lines, O2 needs    Cognition:  Cognition Comment: (P) able to follow commands and make needs known    Upper Extremity Assessment:  Hand Dominance: Not Addressed  Not assessed                 Vision:  Not assessed             Bed Mobility:    Bed Mobility: (P) Assessed     Bed Mobility From 1: (P)  (sitting up in bed to sitting at the edge of bed, SBA)          Transfers:    Transfer: (P) Assessed      Trials/Comments 1: (P)  (Sit<>stand from edge of bed, max assist x2, pt did not come to full stand, stood for approx 10 secs, buckling of the knees)            Ambulation:  Not assessed                 Sitting Balance - Standing Balance:  Impaired in standing                                 ADLs:          LE Dressing: (P) Yes (Able to don bilateral socks while seated in bed)                                  Additional Activities:     ICU Early Mobility  Current ICU Mobility Level: (P) Level I    Activity Tolerance:   Fair, self-limiting and fatigue                  EDUCATION:    Education Documentation  ADL training, taught by Milena Andrews at 6/26/2023   1:09 PM.  Learner: Patient  Readiness: Acceptance  Method: Explanation  Response: Verbalizes Understanding    Education Comments  No comments found.            Time Calculation  Start Time: (P) 0835  Stop Time: (P) 0851  Time Calculation (min): (P) 16 min     TIME CALCULATION   Therapeutic Interventions (Time Spent in Minutes)  Therapeutic Activity Dynamic: (P) 8     OT Untimed Charges - Quick List (Time Spent in Minutes)  OT Eval Low Complexity: (P) 8       EVALUATION:  Low complexity: Brief history review, 1-3 performance deficits, and problem focused assessments with limited number of treatment options, modifications not necessary.                   ASSESSMENT:  Denita presents with decreased strength in LE and fatigues quickly. Denita requires lots of encouragement to engage in therapy, she demonstrates ability to don socks without assist with encouragement. Denita attempted to stand today but was unable to come to complete stand due to knees buckling. She would continue to benefit from skilled OT.     Rehab Potential:    Fair     Barriers to outcome:Fatigue        GOALS:        Multi-Disciplinary Problems (from Occupational Therapy)      Active Problems       Problem: Dressings Lower Extremities  Start Date: 06/26/23      Goal Start Date Expected End Date End Date    STG - Patient will tolerate dynamic standing 06/26/23 07/11/23 --    Goal Details: With contact guard assist in prep for completing functional transfers and ADLs with front wheeled walker.                 Problem: Transfers  Start Date: 06/26/23      Goal Start Date Expected End Date End Date    LTG-Patient will complete all functional transfers 06/26/23 07/26/23 --    Goal Details: With wheelchair in order to complete ADLs with contact guard assist.                                 PLAN:  It is recommended that the client attend rehabilitative therapy for up to 5 visits per week with an expected duration through Certification Date: (P) 07/26/23.   Interventions during the course of treatment may include any combination of the following:  ADL retraining  Therapeutic activity  Therapeutic exercise  UE strengthening/ROM  Endurance training  Cognitive skills development  Patient/family training  Equipment evaluation/education  Compensatory technique education    Recommendation  Recommendation: (P) continue skilled OT    Plan Comment: (P) EM1, standing as able, ADLs eob    Thank you for allowing us to share in the care of this patient. If you have any questions, recommendations, or further concerns regarding this patient, please feel free to contact us at [address] [place] [id number]  Dept: [phone]      Signed by: Milena Andrews 6/26/2023  1:11 PM      * I have reviewed the plan of care and certify a continuing need for medically necessary services    Co-signed by:_________________________ Date and Time:________________    All sessions supervised by a licensed OT  Authored by Milena Andrews, OTS  Co-signed by GIANCARLO Waller, OTR/L

## 2023-06-26 NOTE — PROGRESS NOTES
Daily Progress Note    Chief complaint: copd exacerbation    Assessment/Plan   Acute copd exacerbation  Acute on chronic hypoxic hypercapnic resp failure, successfully extubated    Titrate oxygen per protocol, she is currently on 6 l nc, back to baseline   Change steroid to prednisone 40 mg/ day  cont stiolto and xopenex neb  cont roflumilast  Transfer to floor  Pt/ ot as vi  Home in 1 - 2 days      Principal Problem:    Acute respiratory failure (CMS/HCC)  Active Problems:    Acute on chronic respiratory failure with hypercapnia (CMS/HCC)    COPD exacerbation (CMS/HCC)      Subjective   In mild resp distress on nc      Objective     Physical Exam:  General:  In mild resp distress  Neck:  Symmetric  Lung:  Markedly reduced bs but clear  Heart:  rrr  Ab:  Soft, non-tender, bs+  Ext:  Edema+-    Vital signs in last 24 hours:  Temp:  [36.7 °C (98.1 °F)-36.8 °C (98.2 °F)] 36.7 °C (98.1 °F)  Heart Rate:  [] 102  Resp:  [9-48] 20  SpO2:  [91 %-100 %] 100 %  BP: ()/(53-95) 110/95  FiO2 (%):  [40 %] 40 %  SpO2/FiO2 Ratio Using Measured FiO2 (%):  [237.5-250] 250  SpO2/FiO2 Ratio Using Approximate FiO2 (%):  [215.9-312.5] 312.5  Estimated P/F Ratio Using Approximate FiO2 (%):  [193.2-271.4] 271.4  Estimated P/F Ratio Using Measured FiO2 (%):  [210.7-220.8] 220.8    Intake/Output last 24h:  I/O last 3 completed shifts:  In: 50 [P.O.:50]  Out: 810 [Urine:810]  Intake/Output last 8h:  No intake/output data recorded.    Nutristionist consulted- appreciate assistance.    Malnutrition Assessment:  Type: Protein calorie  Parameters for Malnutrition: Risk for malnutrition       Evaluation:               DXAA REED MD

## 2023-06-26 NOTE — INTERDISCIPLINARY/THERAPY
[address] [facility] [address]  [phone]           Physical Therapy Initial Evaluation     Date of Service: 06/26/23    Patient Name: Denita Spring  Referring Provider: BRONWYN BURR  Medicare or Medicaid note, cosigner has been added.: Yes    Onset Date: 6/22/23  SOC Date: 06/26/23  Certification Period: 09/24/23    HICN: [id number]    Medical Diagnosis:   Acute respiratory failure (CMS/[facility]) [J96.00]    Treatment Diagnoses:  Problem List: Decreased strength, Decreased endurance, Impaired balance, Decreased mobility      Subjective   HISTORY OF CURRENT COMPLAINT:   Patient is a 63 year old female admitted to the hospital with COPD exacerbation that initially required intubation. She has not been out of bed but has been moving around in the bed on her own since she was extubated a few days ago. Patient sitting in bed and reluctantly agreeable to therapy. She initially states that she is too weak and needs to wait until she feels stronger to try therapy or OOB activity. With strong encouragement, she agrees to participate    PRIOR LEVEL OF FUNCTION:   Patient lives with family and utilizes a wheelchair at baseline. She has a ramped entrance.  She reports she can self-transfer when she is feeling better    SOCIAL/OCCUPATIONAL/RECREATIONAL:  None stated    Pain:  Pain Assessment Scale  0-10 Pain Score: 0 - No pain       Past Medical History:   Diagnosis Date    Arthritis     Asthma     Cancer (CMS/[facility])     cervical CA    COPD (chronic obstructive pulmonary disease) (CMS/[facility])     History of transfusion     Obesity (BMI 30.0-34.9) 2/14/2018    Osteoporosis     Pneumonia     Wears dentures          Current Facility-Administered Medications:     [START ON 6/27/2023] fluticasone propionate (FLONASE) 50 mcg/actuation nasal spray 2 spray, 2 spray, Each Nostril, Daily, Freda Becerra MD    [START ON 6/27/2023] predniSONE (DELTASONE) tablet 40 mg, 40 mg, oral, Daily, Freda Becerra MD    melatonin tablet 6  mg, 6 mg, oral, Nightly, Freda Becerra MD    lansoprazole (PREVACID) capsule 15 mg, 15 mg, oral, 2x daily before meals, Freda Becerra MD, 15 mg at 06/26/23 0609    roflumilast (DALIRESP) tablet 500 mcg, 500 mcg, oral, Daily, Freda Becerra MD, 500 mcg at 06/26/23 0909    tiotropium-olodateroL (STIOLTO RESPIMAT) 2.5-2.5 mcg/actuation inhaler 2 puff, 2 puff, inhalation, Daily, Freda Becerra MD, 2 puff at 06/26/23 0834    levalbuterol (XOPENEX) 1.25 mg/3 mL nebulizer solution 1.25 mg, 1.25 mg, nebulization, 4x daily, Freda Becerra MD, 1.25 mg at 06/26/23 1128    levalbuterol (XOPENEX) 1.25 mg/3 mL nebulizer solution 1.25 mg, 1.25 mg, nebulization, q4h PRN, Freda Becerra MD, 1.25 mg at 06/25/23 2311    sodium chloride (OCEAN) 0.65 % nasal spray 1 spray, 1 spray, Each Nostril, PRN, Freda Becerra MD    apixaban (ELIQUIS) tablet 2.5 mg, 2.5 mg, oral, 2x daily, Michael Rivas MD, 2.5 mg at 06/26/23 0909    butalbital-acetaminophen-caff (FIORICET, ESGIC) 2 tablet, 2 tablet, oral, q4h PRN, Karlee Rico CNP, 2 tablet at 06/26/23 0608    [Held by provider] dilTIAZem CD/XR (CARDIZEM CD/DILACOR XR) 24 hr capsule 180 mg, 180 mg, oral, Daily, Karlee Rico CNP    loratadine (CLARITIN) tablet 10 mg, 10 mg, oral, Daily, Karlee Rico CNP, 10 mg at 06/26/23 0909    sodium chloride flush 10 mL, 10 mL, intravenous, PRN, Michael Rivas MD, 10 mL at 06/23/23 2103    senna (SENOKOT) tablet 17.2 mg, 17.2 mg, oral, Nightly, Michael Rivas MD, 17.2 mg at 06/25/23 2035    bisacodyL (DULCOLAX) suppository 10 mg, 10 mg, rectal, q6h PRN, Michael iRvas MD    sodium phosphates (FLEET) enema 1 enema, 1 enema, rectal, PRN, Michael Rivas MD    ethyl alcohoL (Nozin Nasal Antiseptic POPSwab) ampule 1 Application, 1 Application, nasal, 2x daily, Michael Rivas MD, 1 Application at 06/26/23 0910    acetaminophen (TYLENOL) tablet 650 mg, 650 mg, oral, q6h PRN, Michael Rivas MD, 650 mg at 06/25/23 0045     albuterol 2.5 mg/3 mL nebulizer solution 2.5 mg, 2.5 mg, nebulization, q2h PRN, Michael Rivas MD    ALLERGIES:  Cefuroxime axetil, Diphenhydramine hcl, Erythromycin lactobionate, Haloperidol, Methylphenidate, Metoclopramide, Propoxyphene, Pseudoephedrine, Tramadol, Codeine, and Latex    CURRENT ASSISTIVE OR ADAPTIVE EQUIPMENT:  wheelchair  Guillaume Fall Risk Score: 70  FALL RISK Interventions: fall risk bundle    DIAGNOSTIC TESTING:  NA  ACTIVITIES LIMITED BY PATIENT COMPLAINT: unable to transfer     PATIENT'S GOALS FOR THERAPY:   Go home       Objective     FINDINGS:  Co-treat: Yes with OT due to patient's medical acuity and decreased activity tolerance     Precautions: Other Precautions: fall risk, WC bound    Cognition: Requires strong encouragement and education to trial activity throughout session    Bed Mobility: Patient sitting crisscrossed in the bed at beginning and end     Transfers: Patient performed partial sit to stand with SBA initially but does not obtain upright position    Balance: Patient sits edge of bed with SBA     Activity Tolerance: Patient does desat to 86-87% with mobility and requires 4 L O2 throughout session.          ICU Early Mobility  Current ICU Mobility Level: Level I  ICU Mobility Level I (Goals & Progression): Patient Does Not Meets Criteria to Advance to Mobility Level II?                              EDUCATION:    Education Documentation  Mobility training, taught by Altagracia Brady, PT at 6/26/2023  2:16 PM.  Learner: Patient  Readiness: Acceptance  Method: Explanation  Response: Verbalizes Understanding    Education Comments  No comments found.            Time Calculation  Start Time: 0836  Stop Time: 0852  Time Calculation (min): 16 min    Therapeutic Interventions (Time Spent in Minutes)  Therapeutic Activity: 8         PT Untimed Charges - Quick List (Time Spent in Minutes)  PT Eval Low Complexity: 8         EVALUATION:  Low complexity: Brief history review, 1-3  performance deficits, and problem focused assessments with limited number of treatment options, modifications not necessary.         INITIAL TREATMENT:  Patient able to don socks with encouragement and SBA for safety    Transfer: On second attempt sit<>stand, patient initially reports that she cannot do it because her knee does not work. Patient encouraged to trial transfer with PT/OT assistance as she almost stood up without assistance. She reluctantly agrees and requires max A sit>stand and does not obtain upright position. She also does not keep R foot on the floor and has knees flexed throughout        ASSESSMENT:  Patient presents with impaired mobility and strength. She is below baseline for mobility and is not able to safely transfer out of bed without a heavy 2 assist at this time.  This patient would benefit from skilled PT to address mobility deficits as able.    Rehab Potential:    Fair    Barriers to outcome: Low prior level of function        GOALS:        Multi-Disciplinary Problems (from Physical Therapy)      Active Problems       Problem: TRANSFERS  Start Date: 06/26/23      Goal Start Date Expected End Date End Date    LTG - Patient will demonstrate safe transfer techniques 06/26/23 09/24/23 --    Goal Details: From bed <>chair with least restrictive assistive device and SBA                                 PLAN:  It is recommended that the client attend rehabilitative therapy for up to 5 visits per week with an expected duration through Certification Period: 09/24/23.  Interventions during the course of treatment may include any combination of the following:  Therapeutic exercise, therapeutic activity, gait training, , neuromuscular re-education,, and equipment evaluation/education,.     Recommendation  Recommendations for Therapy: Continue skilled therapy, Unable to tolerate 3 hours therapy    Plan Comment: Early mobility I: bed mobility SBA, transfers max A sit<>stand, standing tolerance- bring  walker to trial, progress transfers to chair as able    Thank you for allowing us to share in the care of this patient. If you have any questions, recommendations, or further concerns regarding this patient, please feel free to contact us at [address] [place] [phone]  Dept: [phone]      Signed by: TRACI MCNEIL, PT 6/26/2023  2:17 PM      * I have reviewed the plan of care and certify a continuing need for medically necessary services    Co-signed by:_________________________ Date and Time:________________

## 2023-06-26 NOTE — NURSING END OF SHIFT
ICU Nursing Shift Summary:    Patient: Denita Spring  MRN: [id number]  : 1960, Age: 63 y.o.    Location: [address] [place] [address]    Admit date: 2023  Primary Care Provider: [facility]  Attending Provider: Michael Rivas MD    23    Nursing Goals    Clinical Goals for the Shift: Monitor hemodynamics and respiratory integrity. Round with consulted teams as to nightly care plan. Promote safety and comfort while in ICU environment.    Narrative Summary of Progress Towards Clinical Goals:  N/a    Nursing Concerns/Thoughts for MD:  No    New Patient or Family Concerns/Issues:  No    Shift Summary:    Patient received from day shift resting in bed. She was given her night-time medications and slept for the majority of the shift. X2 briefs were saturated with urine; they were exchanged for clean briefs and she fell back asleep. Oxygen weaned down to 2L NC by end of shift.    Major Event(s): NAEON    Brief Narratives  Changes in VS and Rhythm: Reference flow sheet   Changes in Exam: No     Significant Events & Communications to Providers (last 12 hours)       Last 5 Values    No documentation.                 Oxygen Usage (last 12 hours)       Last 5 Values       Row Name 23 1800 23 1900 23 1916 23 19223       Oxygen Therapy    SpO2 100 % 100 % 96 % 100 % 97 %    O2 Delivery Interface -- -- Nasal cannula -- Nasal cannula    O2 Flow Rate (L/min) -- -- 4 L/min -- 4 L/min    FiO2 (%) 40 % 40 % -- -- --    Change in O2 Flow Rate -- -- -1 -- 0      Row Name 23 2100 23 2200 23 2300 23 2313 23 0000       Oxygen Therapy    SpO2 96 % 95 % 98 % 97 % 99 %    O2 Flow Rate (L/min) -- -- -- 4 L/min --    Change in O2 Flow Rate -- -- -- 0 --      Row Name 23 0400                   Oxygen Therapy    O2 Delivery Interface Nasal cannula                      Mobility (last 12 hours)       Last 5 Values       Row Name 23                   Mobility     Patient Position Supine        Turning Turns self                      Urethral Catheter       Active Urethral Catheter       None                  Active Lines       Active Central venous catheter / Peripherally inserted central catheter / Implantable Port / Hemodialysis catheter / Midline Catheter       Name Placement date Placement time Site Days    Midline Peripheral 06/22/23 Right Brachial 06/22/23  1725  Brachial  3                  Infusing Medications   Medication Dose Last Rate       Current LOC: General Med/Surg

## 2023-06-27 LAB
ANION GAP SERPL CALC-SCNC: 6 MMOL/L (ref 3–11)
BUN SERPL-MCNC: 18 MG/DL (ref 7–25)
CALCIUM SERPL-MCNC: 9.4 MG/DL (ref 8.6–10.3)
CHLORIDE SERPL-SCNC: 98 MMOL/L (ref 98–107)
CO2 SERPL-SCNC: 38 MMOL/L (ref 21–32)
CREAT SERPL-MCNC: 0.51 MG/DL (ref 0.6–1.1)
GFR SERPL CREATININE-BSD FRML MDRD: 106 ML/MIN/1.73M*2
GLUCOSE SERPL-MCNC: 125 MG/DL (ref 70–105)
HGB BLD-MCNC: 10.9 G/DL (ref 11.5–15.5)
POTASSIUM SERPL-SCNC: 4.1 MMOL/L (ref 3.5–5.1)
SODIUM SERPL-SCNC: 142 MMOL/L (ref 135–145)

## 2023-06-27 PROCEDURE — 97530 THERAPEUTIC ACTIVITIES: CPT | Mod: GP,CQ

## 2023-06-27 PROCEDURE — 99232 SBSQ HOSP IP/OBS MODERATE 35: CPT | Performed by: INTERNAL MEDICINE

## 2023-06-27 PROCEDURE — 6370000100 HC RX 637 (ALT 250 FOR IP): Performed by: NURSE PRACTITIONER

## 2023-06-27 PROCEDURE — 94640 AIRWAY INHALATION TREATMENT: CPT

## 2023-06-27 PROCEDURE — 6370000100 HC RX 637 (ALT 250 FOR IP): Performed by: INTERNAL MEDICINE

## 2023-06-27 PROCEDURE — 2590000100 HC RX 259: Performed by: INTERNAL MEDICINE

## 2023-06-27 PROCEDURE — 36415 COLL VENOUS BLD VENIPUNCTURE: CPT | Performed by: INTERNAL MEDICINE

## 2023-06-27 PROCEDURE — 6370000100 HC RX 637 (ALT 250 FOR IP)

## 2023-06-27 PROCEDURE — 6360000200 HC RX 636 W HCPCS (ALT 250 FOR IP): Performed by: INTERNAL MEDICINE

## 2023-06-27 PROCEDURE — 80048 BASIC METABOLIC PNL TOTAL CA: CPT | Performed by: INTERNAL MEDICINE

## 2023-06-27 PROCEDURE — 85018 HEMOGLOBIN: CPT | Performed by: INTERNAL MEDICINE

## 2023-06-27 PROCEDURE — (BLANK) HC ROOM SEMI PRIVATE

## 2023-06-27 RX ORDER — OXYMETAZOLINE HCL 0.05 %
2 SPRAY, NON-AEROSOL (ML) NASAL 2 TIMES DAILY
Status: DISCONTINUED | OUTPATIENT
Start: 2023-06-27 | End: 2023-06-28 | Stop reason: HOSPADM

## 2023-06-27 RX ORDER — LIDOCAINE 50 MG/G
2 PATCH TOPICAL DAILY PRN
Status: DISCONTINUED | OUTPATIENT
Start: 2023-06-27 | End: 2023-06-28 | Stop reason: HOSPADM

## 2023-06-27 RX ADMIN — LEVALBUTEROL HYDROCHLORIDE 1.25 MG: 1.25 SOLUTION RESPIRATORY (INHALATION) at 11:32

## 2023-06-27 RX ADMIN — ETHYL ALCOHOL 1 APPLICATION: 62 SWAB TOPICAL at 10:12

## 2023-06-27 RX ADMIN — BUTALBITAL, ACETAMINOPHEN, AND CAFFEINE 2 TABLET: 50; 325; 40 TABLET ORAL at 17:17

## 2023-06-27 RX ADMIN — APIXABAN 2.5 MG: 2.5 TABLET, FILM COATED ORAL at 21:00

## 2023-06-27 RX ADMIN — BUTALBITAL, ACETAMINOPHEN, AND CAFFEINE 2 TABLET: 50; 325; 40 TABLET ORAL at 06:01

## 2023-06-27 RX ADMIN — OXYMETAZOLINE HYDROCHLORIDE 2 SPRAY: 0.05 SPRAY NASAL at 21:08

## 2023-06-27 RX ADMIN — LIDOCAINE 2 PATCH: 140 PATCH CUTANEOUS at 21:02

## 2023-06-27 RX ADMIN — LANSOPRAZOLE 15 MG: 15 CAPSULE, DELAYED RELEASE ORAL at 17:17

## 2023-06-27 RX ADMIN — ACETAMINOPHEN 650 MG: 325 TABLET ORAL at 15:38

## 2023-06-27 RX ADMIN — LORATADINE 10 MG: 10 TABLET ORAL at 10:12

## 2023-06-27 RX ADMIN — LEVALBUTEROL HYDROCHLORIDE 1.25 MG: 1.25 SOLUTION RESPIRATORY (INHALATION) at 17:00

## 2023-06-27 RX ADMIN — ETHYL ALCOHOL 1 APPLICATION: 62 SWAB TOPICAL at 21:10

## 2023-06-27 RX ADMIN — Medication 6 MG: at 21:00

## 2023-06-27 RX ADMIN — OXYMETAZOLINE HYDROCHLORIDE 2 SPRAY: 0.05 SPRAY NASAL at 03:59

## 2023-06-27 RX ADMIN — LEVALBUTEROL HYDROCHLORIDE 1.25 MG: 1.25 SOLUTION RESPIRATORY (INHALATION) at 20:25

## 2023-06-27 RX ADMIN — APIXABAN 2.5 MG: 2.5 TABLET, FILM COATED ORAL at 10:12

## 2023-06-27 RX ADMIN — LEVALBUTEROL HYDROCHLORIDE 1.25 MG: 1.25 SOLUTION RESPIRATORY (INHALATION) at 07:08

## 2023-06-27 RX ADMIN — ROFLUMILAST 500 MCG: 500 TABLET ORAL at 11:25

## 2023-06-27 RX ADMIN — PREDNISONE 40 MG: 20 TABLET ORAL at 10:12

## 2023-06-27 RX ADMIN — LANSOPRAZOLE 15 MG: 15 CAPSULE, DELAYED RELEASE ORAL at 06:01

## 2023-06-27 RX ADMIN — Medication 2 SPRAY: at 10:13

## 2023-06-27 RX ADMIN — TIOTROPIUM BROMIDE AND OLODATEROL 2 PUFF: 3.124; 2.736 SPRAY, METERED RESPIRATORY (INHALATION) at 07:11

## 2023-06-27 RX ADMIN — SENNOSIDES 17.2 MG: 8.6 TABLET, FILM COATED ORAL at 21:00

## 2023-06-27 NOTE — PROGRESS NOTES
Hospitalist Daily Progress Note    Patient name: Denita Spring  :  1960   Age: 63 y.o.  MRN: [id number]   LOS: 5 days     Subjective   No acute events overnight.  No new concerns this morning.  Feels that her breathing is not quite at baseline.  On baseline 4 L supplemental oxygen, some wheezing on exam.  Discussed discharge planning-family provides most of care at home and she is at her physical baseline, so anticipate discharge home, tentatively tomorrow.  Continues to have headache.    Objective   Vitals:Temp:  [36.8 °C (98.2 °F)-36.9 °C (98.4 °F)] 36.9 °C (98.4 °F)  Heart Rate:  [] 93  Resp:  [16-26] 18  SpO2:  [93 %-100 %] 95 %  BP: ()/(50-78) 99/67  SpO2/FiO2 Ratio Using Approximate FiO2 (%):  [258.3-306.3] 263.9  Estimated P/F Ratio Using Approximate FiO2 (%):  [227.5-266.4] 232    Physical Exam:   General: Alert, not in apparent distress  HEENT: Conjunctivae clear, no scleral icterus  Cardiac: Regular rate and rhythm  Respiratory: Breathing comfortably on nasal cannula, scattered wheezing  Abdomen: Soft, nontender  Extremities: No leg edema  Skin: No rashes on exposed skin  Neuro: Fluent speech      Results from last 4 days   Lab Units 23  0958 23  0417   WBC AUTO 10*3/uL  --  10.5   HEMOGLOBIN g/dL 10.9* 10.5*   HEMATOCRIT %  --  31.4*   PLATELETS AUTO 10*3/uL  --  235     Results from last 4 days   Lab Units 23  0739 23  0417 23  0207   SODIUM mmol/L 142 146* 145   POTASSIUM MMOL/L 4.1 4.3 4.0   CHLORIDE mmol/L 98 102 102   CO2 mmol/L 38* 34* 30   BUN mg/dL 18 15 14   CREATININE mg/dL 0.51* 0.52* 0.47*   CALCIUM mg/dL 9.4 9.7 9.8   MAGNESIUM mg/dL  --  1.9  --    GLUCOSE mg/dL 125* 103 105       Assessment/Plan     Summary:  Ms. Spring is a 63-year-old woman with past medical history of severe COPD who presented to [facility] with respiratory distress consistent with COPD exacerbation.  She was intubated and transferred to [facility] for  higher level of care.  CV exacerbation was managed with steroids with nebulizers, extubated within 24 hours of arrival.    Severe COPD  -Presented in severe COPD exacerbation requiring intubation.  Extubated the following day to attempt to avoid ventilator dependence, not tolerant of BiPAP but tolerated high flow nasal cannula  -Not a candidate for lung transplant  -Unclear if she is compliant with inhalers  -Initially on methylprednisone, and switch to prednisone today.  Plan for long prednisone taper  -Resumed on Roflumilast  -Plan to continue nebulized Xopenex, Stiolto  -Baseline 4 L supplemental oxygen, now at baseline  -Tentative plan to discharge home tomorrow    Headache  -Nursing severe headaches since admission    Anticoagulated  -On 2.5 mg apixaban twice a day prior to admission, indication unclear.  Will discuss with patient.  Continued inpatient.    Physical debility  -Patient is a heavy 2 assist to get out of bed, however this appears to be her baseline and family is comfortable assisting her with her physical needs at home  -Complicated by chronic knee pain    DVT prophylaxis: Apixaban  CODE STATUS: Full, see physician note from 6/23 for documentation of CODE STATUS discussion    Electronically signed by: Eva Weems MD  6/27/2023  2:32 PM

## 2023-06-27 NOTE — INTERDISCIPLINARY/THERAPY
"NUTRITION REASSESSMENT:    PERTINENT DIAGNOSIS/MEDICAL PROBLEMS:     Current Diagnosis:  acute respiratory failure, sev COPD exacerbation  PMH: COPD,  osteoporosis, wears dentures    MALNUTRITION:  Malnutrition Present On Admission: No  Parameters for Malnutrition: Risk for malnutrition           6/27: on diet post-extubation, no recent wt loss per pt.       MONITORING:   Intake:   >75% of last 3 meals  Average Percent Meals Eaten (%): 58.33 Avg %     Results from last 4 days   Lab Units 06/27/23  0739 06/24/23  0417   POTASSIUM MMOL/L 4.1 4.3   CHLORIDE mmol/L 98 102   SODIUM mmol/L 142 146*   BUN mg/dL 18 15   CREATININE mg/dL 0.51* 0.52*   CO2 mmol/L 38* 34*   ANION GAP mmol/L 6 10   GLUCOSE mg/dL 125* 103   CALCIUM mg/dL 9.4 9.7   EGFR mL/min/1.73m*2 106 106   MAGNESIUM mg/dL  --  1.9      PERTINENT MEDS: Eliquis, Prevacid, Prednisone, Senna    Edema per nursing: bilateral LE non-pitting  Weight:    -4lbs from UBW   Weights (Current Encounter Only) (last 180 days)       Date/Time Weight    06/26/23 2339 56.3 kg (124 lb 1.9 oz)    06/24/23 0600 56.3 kg (124 lb 1.2 oz)    06/23/23 0000 58.2 kg (128 lb 4.9 oz)    06/22/23 1635 57.1 kg (125 lb 14.1 oz)           Admit Weight: 57.1 kg (125 lb 14.1 oz)    ESTIMATED NEEDS:  Total Energy Estimated Needs: 25 cals/kg ABW (57.1kg) = 1430 cals  Total Protein Estimated Needs: 1.2g PRO/kg IBW = 63g  Total Fluid Estimated Needs: 30ml/kg ABW (57.1kg)=1715ml     SUMMARY: 6/23: ICU, extubated this am but very SOB so did not visit with pt today, on 6L O2 at home, has been evaluated for lung transplant in [place] but declined for unclear reasons, per ICU discussion need to go over goals of care w/pt&family    6/26: Pt moved to floor, visited at bedside. She reports appetite is good and \"stuffed myself\" at lunch today. Denies any appetite changes PTA and reports UBW of ~128lbs with no recent changes. Pt had no questions/concerns at this time.     NUTRITION INTERVENTIONS:    -Regular " diet as tolerated.   -Can add nutrition supplements if po intake declines    Discharge nutrition recommendations:  Can continue regular diet.

## 2023-06-27 NOTE — INTERDISCIPLINARY/THERAPY
[address] [facility] [address]  [phone]      Physical Therapy Daily Treatment Note      Date of Service: 06/27/23  Patient Name: Denita Spring  Referring Provider: BRONWYN BURR  Visit Number: 2   Start of Care Date: 06/26/23  Certification Period: 09/24/23      Medical Diagnosis:  Acute respiratory failure (CMS/HCC) [J96.00]    Treatment Diagnoses:   Problem List: Decreased strength, Decreased endurance, Impaired balance, Decreased mobility    Subjective     Therapy Treatment Diagnosis: impaired mobility with COPD exacerbation       RN approved PT services. Pt found in bed and agreeable to PT. Gait belt donned for mobility. Pt left in bed with call light in reach, needs met, and alarm on.         Pain:   Pain Assessment Scale  0-10 Pain Score:  (did not rate right knee pain)         Objective      Co-treat: no    Precautions: Other Precautions: fall risk, WC bound    Cognition: self limiting    Bed Mobility: Modified Independent for supine<>Sit with the HEAD OF BED slightly raised.     Transfers: NA, pt declines to attempt due to pain and SOB    Ambulation: NA    Stairs: NA    Balance: standby assist for sitting balance.     Other Activities: Sat edge of bed for about 5 minutes with standby assist, she performed 2 reps of R LE long arc quads but was limited by pain. She also performed a few ankle pumps bilaterally and did a few L LE long arc quads, she needed cues to slow down when performing the L LE exercises as she became very SHORT OF BREATH.   Pt demonstrated the exercises she performs on her own. She did a few quick reps of a bicycling action with B LE in bed and R LE heel slides. She needed cues to perform them slowly and to extend her knee more to move through the full range of motion of her R knee.   She needed cues to slow down as she became SHORT OF BREATH with supine exercises.       Activity Tolerance: pt was self limiting due to pain, she also gets SHORT OF BREATH with minimal  activity. She requested a nebulizer and the RN was informed.                                                                         Time Calculation  Start Time: 1433  Stop Time: 1447  Time Calculation (min): 14 min   Therapeutic Interventions (Time Spent in Minutes)  Therapeutic Activity: 14                   ASSESSMENT:  Pt was not willing to progress mobility today due to right knee pain and SHORT OF BREATH with activity. She is able to perform bed mobility independently     PLAN FOR NEXT TREATMENT SESSION:           Plan Comment: 1-2 assist BRING WALKER, progress transfers, standing as able, LE exercise and ROM    Thank you for allowing us to share in the care of this patient. If you have any questions, recommendations, or further concerns regarding this patient, please feel free to contact me at [address] [place] [phone]  Dept: [phone].  Signed by: Xiang Braxton PTA  6/27/2023  2:53 PM

## 2023-06-27 NOTE — PLAN OF CARE
Problem: Knowledge Deficit  Goal: Patient/family/caregiver demonstrates understanding of disease process, treatment plan, medications, and discharge instructions  Description: INTERVENTIONS:   1. Complete learning assessment and assess knowledge base  2. Provide teaching at level of understanding   3. Provide teaching via preferred learning methods  Outcome: Progressing     Problem: Potential for Compromised Skin Integrity  Goal: Skin Integrity is Maintained or Improved  Description: INTERVENTIONS:  1. Assess and monitor skin integrity  2. Collaborate with interdisciplinary team and initiate plans and interventions as needed  3. Alternate a full bath with partial baths for elderly   4. Monitor patient's hygiene practices   5. Collaborate with wound, ostomy, and continence nurse  Outcome: Progressing  Goal: Nutritional status is improving  Description: INTERVENTIONS:  1. Monitor and assess patient for malnutrition (ex- brittle hair, bruises, dry skin, pale skin and conjunctiva, muscle wasting, smooth red tongue, and disorientation)  2. Monitor patient's weight and dietary intake as ordered or per policy  3. Determine patient's food preferences and provide high-protein, high-caloric foods as appropriate  4. Assist patient with eating   5. Allow adequate time for meals   6. Encourage patient to take dietary supplement as ordered   7. Collaborate with dietitian  8. Include patient/family/caregiver in decisions related to nutrition  Outcome: Progressing  Goal: MOBILITY IS MAINTAINED OR IMPROVED  Description: INTERVENTIONS  1. Collaborate with interdisciplinary team and initiate plan and interventions as ordered (PT/OT)  2. Encourage ambulation  3. Up to chair for meals  4. Monitor for signs of deconditioning  Outcome: Progressing     Problem: Urinary Incontinence  Goal: Perineal skin integrity is maintained or improved  Description: INTERVENTIONS:  1. Assess genitourinary system, perineal skin, labs (urinalysis), and  history of incontinence to include past management, aggravating, and alleviating factors  2. Collaborate with interdisciplinary team including wound, ostomy, and continence nurse and initiate plans and interventions as needed  4. Consider urine containment device  5. Apply skin protectant   6. Develop skin care regimen  7. Provide privacy when changing patient's incontinence device to maintain their dignity  Outcome: Progressing     Problem: Safety Adult - Fall  Goal: Free from fall injury  Description: INTERVENTIONS:    Inpatient - Please reference Cares/Safety Flowsheet under [facility] Fall Risk for interventions.  Pediatrics - Please reference Peds Daily Cares/Safety Flowsheet under [facility] Pediatric Fall Assessment Fall Bundle for interventions  LD/OB - Please reference OB Shift Screening Flowsheet under OB Fall Risk for interventions.  Outcome: Progressing     Problem: Respiratory - Adult  Goal: Achieves optimal ventilation and oxygenation  Description: INTERVENTIONS:  1. Assess for changes in respiratory status  2. Assess for changes in mentation and behavior  3. Position to facilitate oxygenation and minimize respiratory effort  4. Oxygen supplementation based on oxygen saturation or ABGs  5. Assess patient's ability to cough effectively  6. Encourage broncho-pulmonary hygiene including cough, deep breathe  7. Assess the need for suctioning   8. Assess and instruct to report SOB or any respiratory difficulty  9. Respiratory Therapy support as indicated, including medications and treatment.  Outcome: Progressing     Problem: Cardiovascular - Adult  Goal: Maintains optimal cardiac output and hemodynamic stability  Description: INTERVENTIONS:  1. Monitor vital signs and rhythm  2. Monitor for hypotension and other signs of decreased cardiac output  3. Administer and titrate ordered vasoactive medications to optimize hemodynamic stability  4. Monitor for fluid overload/dehydration, weight gain, shortness of breath  and activity intolerance  5. Monitor arterial and/or venous puncture sites for bleeding and/or hematoma  6. Assess quality of pulses, capillary refill, edema, sensation, skin color and temperature  7. Assess for signs of decreased coronary artery perfusion - ex. angina  Outcome: Progressing  Goal: Absence of cardiac dysrhythmias or at baseline  Description: INTERVENTIONS:  1. Continuous cardiac monitoring, monitor vital signs, obtain 12 lead EKG if indicated  2. Administer antiarrhythmic and heart rate control medications as ordered  3. Initiate emergency measures for life threatening arrhythmias  4. Monitor electrolytes and administer replacement therapy as ordered  Outcome: Progressing     Problem: Genitourinary - Adult  Goal: Urinary catheter remains patent  Description: INTERVENTIONS:  1. Assess patency of urinary catheter.  2. Irrigate catheter per order if indicated and notify provider if unable to irrigate.  3. Assess need for a larger catheter size or a 3-way catheter for continuous bladder irrigation.  Outcome: Progressing  Goal: Absence of Urinary Infection  Description: INTERVENTIONS:  1. Assess urinary status for burning, urgency, frequency, and pain  2. Assess urine for color, cloudiness, odor, and amount  3. Monitor intake/output  4. Monitor lab values  5. Obtain urinalysis as ordered  6. Assess for neurological status changes    Outcome: Progressing     Problem: Metabolic/Fluid and Electrolytes - Adult  Goal: Maintain Optimal Renal Function and Hemodynamic Stability  Description: INTERVENTIONS:  1. Monitor labs and assess for signs and symptoms of volume excess or deficit  2. Monitor intake, output and patient weight  3. Monitor urine specific gravity, serum osmolarity and serum sodium as indicated or ordered  4. Monitor response to interventions for patient's volume status, including labs, urine output, blood pressure (other measures as available)  5. Encourage oral intake as appropriate  6. Instruct  patient on fluid and nutrition restrictions as appropriate  Outcome: Progressing     Problem: Pain - Adult  Goal: Verbalizes/displays adequate comfort level or baseline comfort level  Description: INTERVENTIONS:  1. Encourage patient to monitor pain and request interventions  2. Assess pain using the appropriate pain scale  3. Administer analgesics based on type and severity of pain and evaluate response  4. Educate/Implement non-pharmacological measures as appropriate and evaluate response  5. Consider cultural, developmental and social influences on pain and pain management  6. Notify Provider if interventions unsuccessful or patient reports new pain  Outcome: Progressing     Problem: Dressings Lower Extremities  Goal: STG - Patient will tolerate dynamic standing  Description: With contact guard assist in prep for completing functional transfers and ADLs with front wheeled walker.   Outcome: Progressing

## 2023-06-27 NOTE — NURSING END OF SHIFT
Nursing End of Shift Summary:    Patient: Denita Spring  MRN: [id number]  : 1960, Age: 63 y.o.    Location: [address] [place] [address]    Nursing Goals  Clinical Goals for the Shift: maitain pt safety and comfort    Narrative Summary of Progress Toward Clinical Goals:  PRN pain medication given for migraine. Pt requested for RT breathing treatments. Maintained pt safety and comfort.    Barriers to Goals/Nursing Concerns:  No    New Patient or Family Concerns/Issues:  No    Shift Summary:   Significant Events & Communications to Providers (last 12 hours)       Last 5 Values    No documentation.                 Oxygen Usage (last 12 hours)       Last 5 Values    No documentation.                 Mobility (last 12 hours)       Last 5 Values       Row Name 23 0800 23 0801 23 1200 23 1532          Mobility    Activity Sleeping -- Turn --     Length of Time in Chair (min) 0 -- 0 --     Distance Ambulated (feet) 0 Feet -- 0 Feet --     Distance Ambulated (Meters Calculated) 0 Meters -- 0 Meters --     Patient Position Sitting Sitting -- Sitting     Turning Turns self -- Turns self --     Distance Ambulated (Meters Calculated)(Do Not Use) 0 Feet -- 0 Feet --                   Urethral Catheter       Active Urethral Catheter       None                  Active Lines       Active Central venous catheter / Peripherally inserted central catheter / Implantable Port / Hemodialysis catheter / Midline Catheter       Name Placement date Placement time Site Days    Midline Peripheral 23 Right Brachial 23  1725  Brachial  5                  Infusing Medications   Medication Dose Last Rate     PRN Medications   Medication Dose Last Admin    levalbuterol (XOPENEX) nebulizer solution 1.25 mg orderable  1.25 mg 1.25 mg at 23 2311    sodium chloride  1 spray      butalbital-acetaminophen-caff  2 tablet 2 tablet at 23 1717    sodium chloride  10 mL 10 mL at 23 2103    bisacodyL  10 mg      sodium  phosphates  1 enema      acetaminophen  650 mg 650 mg at 06/27/23 1538    albuterol  2.5 mg 2.5 mg at 06/26/23 1518

## 2023-06-27 NOTE — NURSING END OF SHIFT
Nursing End of Shift Summary:    Patient: Denita Spring  MRN: [id number]  : 1960, Age: 63 y.o.    Location: [address] [place] [address]    Nursing Goals  Clinical Goals for the Shift: Patient will maintain oxygen saturation above 88% during shift. She will report decreasing of shortess of breath and chest tightness after breathing treatment.    Narrative Summary of Progress Toward Clinical Goals:  Patient maintained oxygen saturation above 90% during shift at 3.5 L NC, she received a dose of PRN duo neb due to increasing shortness of breath and chest tightness. She states the duo neb helped her with the chest tightness but still having shortness of breath at rest.    Barriers to Goals/Nursing Concerns:  Yes - Patient at 6L NC at baseline, her oxygen goal is between 88% and 92%    New Patient or Family Concerns/Issues:  No    Shift Summary:   Significant Events & Communications to Providers (last 12 hours)       Last 5 Values       Row Name 23 0052 23 0300                Provider Notification    Reason for Communication Evaluate  -MY Evaluate  -MY       Provider Name Karlee Tanner  -TD Tanner  -MY                 User Key  (r) = Recorded By, (t) = Taken By, (c) = Cosigned By      Initials Name    Najma Conde, SELVIN                  Oxygen Usage (last 12 hours)       Last 5 Values       Row Name 23                   Room Air or Baseline Oxygen Trial by Nursing    Home Baseline Oxygen Flow Rate (L/min) 6 (L/min)        Home Oxygen Use Frequency Continuous        Is Patient on Room Air OR on the Same Amount of O2 as at Home? No        Are You Performing the QShift O2 Trial? No        Reason Trial Is Not Being Performed Does not meet criteria        Reason Criteria Is Not Met (Link to Policy in Row Information) Respiratory status not stable                      Mobility (last 12 hours)       Last 5 Values       Row Name 23 2143 23 2339 23 0027          Mobility     Activity -- Dangle -- --     Level of Assistance -- Minimal assist, patient does 75% or more -- --     Length of Time in Chair (min) -- 0 -- --     Distance Ambulated (feet) -- 0 Feet -- --     Distance Ambulated (Meters Calculated) -- 0 Meters -- --     Patient Position Supine -- Supine Sitting     Turning Turns self;Pillow support Turns self;Pillow support -- --     Distance Ambulated (Meters Calculated)(Do Not Use) -- 0 Feet -- --                   Urethral Catheter       Active Urethral Catheter       None                  Active Lines       Active Central venous catheter / Peripherally inserted central catheter / Implantable Port / Hemodialysis catheter / Midline Catheter       Name Placement date Placement time Site Days    Midline Peripheral 06/22/23 Right Brachial 06/22/23 1725  Brachial  4                  Infusing Medications   Medication Dose Last Rate     PRN Medications   Medication Dose Last Admin    levalbuterol (XOPENEX) nebulizer solution 1.25 mg orderable  1.25 mg 1.25 mg at 06/25/23 2311    sodium chloride  1 spray      butalbital-acetaminophen-caff  2 tablet 2 tablet at 06/26/23 1723    sodium chloride  10 mL 10 mL at 06/23/23 2103    bisacodyL  10 mg      sodium phosphates  1 enema      acetaminophen  650 mg 650 mg at 06/25/23 0045    albuterol  2.5 mg 2.5 mg at 06/26/23 2138

## 2023-06-27 NOTE — PLAN OF CARE
Problem: Potential for Compromised Skin Integrity  Goal: Skin Integrity is Maintained or Improved  Description: INTERVENTIONS:  1. Assess and monitor skin integrity  2. Collaborate with interdisciplinary team and initiate plans and interventions as needed  3. Alternate a full bath with partial baths for elderly   4. Monitor patient's hygiene practices   5. Collaborate with wound, ostomy, and continence nurse  Outcome: Progressing  Goal: Nutritional status is improving  Description: INTERVENTIONS:  1. Monitor and assess patient for malnutrition (ex- brittle hair, bruises, dry skin, pale skin and conjunctiva, muscle wasting, smooth red tongue, and disorientation)  2. Monitor patient's weight and dietary intake as ordered or per policy  3. Determine patient's food preferences and provide high-protein, high-caloric foods as appropriate  4. Assist patient with eating   5. Allow adequate time for meals   6. Encourage patient to take dietary supplement as ordered   7. Collaborate with dietitian  8. Include patient/family/caregiver in decisions related to nutrition  Outcome: Progressing  Goal: MOBILITY IS MAINTAINED OR IMPROVED  Description: INTERVENTIONS  1. Collaborate with interdisciplinary team and initiate plan and interventions as ordered (PT/OT)  2. Encourage ambulation  3. Up to chair for meals  4. Monitor for signs of deconditioning  Outcome: Progressing     Problem: Urinary Incontinence  Goal: Perineal skin integrity is maintained or improved  Description: INTERVENTIONS:  1. Assess genitourinary system, perineal skin, labs (urinalysis), and history of incontinence to include past management, aggravating, and alleviating factors  2. Collaborate with interdisciplinary team including wound, ostomy, and continence nurse and initiate plans and interventions as needed  4. Consider urine containment device  5. Apply skin protectant   6. Develop skin care regimen  7. Provide privacy when changing patient's incontinence  device to maintain their dignity  Outcome: Progressing     Problem: Safety Adult - Fall  Goal: Free from fall injury  Description: INTERVENTIONS:    Inpatient - Please reference Cares/Safety Flowsheet under [facility] Fall Risk for interventions.  Pediatrics - Please reference Peds Daily Cares/Safety Flowsheet under [facility] Pediatric Fall Assessment Fall Bundle for interventions  LD/OB - Please reference OB Shift Screening Flowsheet under OB Fall Risk for interventions.  Outcome: Progressing     Problem: Respiratory - Adult  Goal: Achieves optimal ventilation and oxygenation  Description: INTERVENTIONS:  1. Assess for changes in respiratory status  2. Assess for changes in mentation and behavior  3. Position to facilitate oxygenation and minimize respiratory effort  4. Oxygen supplementation based on oxygen saturation or ABGs  5. Assess patient's ability to cough effectively  6. Encourage broncho-pulmonary hygiene including cough, deep breathe  7. Assess the need for suctioning   8. Assess and instruct to report SOB or any respiratory difficulty  9. Respiratory Therapy support as indicated, including medications and treatment.  Outcome: Progressing

## 2023-06-27 NOTE — NURSING NOTE
06/27/23 0300   Provider Notification   Reason for Communication Evaluate   Provider Name Karlee Tanner   Provider Role Advanced Practice Provider   Method of Communication Page  (Patient presented with intermittent nosebleed, requesting nasal gel/spray)   Response See orders   Notification Time 0300

## 2023-06-27 NOTE — NURSING NOTE
06/27/23 0052   Provider Notification   Reason for Communication Evaluate   Provider Name Karlee Tanner   Provider Role Advanced Practice Provider   Method of Communication Page  (Manual BP 80/50, patient was asleep; re-check when patient was sitting up and it was 98/61 MAP 69. HR 86. tachypneic at 4L NC. Patient is alert.)   Response No new orders  (Provider verified trend of VS when previous values below 100s at night. Continue monitoring.)   Notification Time 0052

## 2023-06-28 VITALS
OXYGEN SATURATION: 96 % | WEIGHT: 125.44 LBS | HEIGHT: 63 IN | TEMPERATURE: 98.6 F | BODY MASS INDEX: 22.23 KG/M2 | RESPIRATION RATE: 18 BRPM | SYSTOLIC BLOOD PRESSURE: 121 MMHG | DIASTOLIC BLOOD PRESSURE: 81 MMHG | HEART RATE: 114 BPM

## 2023-06-28 PROBLEM — I48.0 PAROXYSMAL ATRIAL FIBRILLATION (CMS/HCC): Status: ACTIVE | Noted: 2023-06-28

## 2023-06-28 PROCEDURE — 6360000200 HC RX 636 W HCPCS (ALT 250 FOR IP): Performed by: INTERNAL MEDICINE

## 2023-06-28 PROCEDURE — 93010 ELECTROCARDIOGRAM REPORT: CPT | Performed by: INTERNAL MEDICINE

## 2023-06-28 PROCEDURE — 6370000100 HC RX 637 (ALT 250 FOR IP): Performed by: INTERNAL MEDICINE

## 2023-06-28 PROCEDURE — 97116 GAIT TRAINING THERAPY: CPT | Mod: GP | Performed by: PHYSICAL THERAPIST

## 2023-06-28 PROCEDURE — 93005 ELECTROCARDIOGRAM TRACING: CPT | Performed by: INTERNAL MEDICINE

## 2023-06-28 PROCEDURE — 6370000100 HC RX 637 (ALT 250 FOR IP): Performed by: NURSE PRACTITIONER

## 2023-06-28 PROCEDURE — 99239 HOSP IP/OBS DSCHRG MGMT >30: CPT | Performed by: INTERNAL MEDICINE

## 2023-06-28 PROCEDURE — 97530 THERAPEUTIC ACTIVITIES: CPT | Mod: GP | Performed by: PHYSICAL THERAPIST

## 2023-06-28 PROCEDURE — 94640 AIRWAY INHALATION TREATMENT: CPT

## 2023-06-28 RX ORDER — FLUTICASONE PROPIONATE AND SALMETEROL 500; 50 UG/1; UG/1
1 POWDER RESPIRATORY (INHALATION) 2 TIMES DAILY
Qty: 60 EACH | Refills: 0 | Status: SHIPPED | OUTPATIENT
Start: 2023-06-28

## 2023-06-28 RX ORDER — DILTIAZEM HYDROCHLORIDE 120 MG/1
120 CAPSULE, COATED, EXTENDED RELEASE ORAL DAILY
Status: DISCONTINUED | OUTPATIENT
Start: 2023-06-28 | End: 2023-06-28 | Stop reason: HOSPADM

## 2023-06-28 RX ORDER — PREDNISONE 5 MG/1
TABLET ORAL
Qty: 47 TABLET | Refills: 0 | Status: SHIPPED | OUTPATIENT
Start: 2023-06-28 | End: 2023-07-13

## 2023-06-28 RX ORDER — BUTALBITAL, ACETAMINOPHEN AND CAFFEINE 50; 325; 40 MG/1; MG/1; MG/1
1 TABLET ORAL EVERY 4 HOURS PRN
Qty: 10 TABLET | Refills: 0 | Status: SHIPPED | OUTPATIENT
Start: 2023-06-28 | End: 2023-07-08

## 2023-06-28 RX ADMIN — DILTIAZEM HYDROCHLORIDE 120 MG: 120 CAPSULE, EXTENDED RELEASE ORAL at 11:01

## 2023-06-28 RX ADMIN — ETHYL ALCOHOL 1 APPLICATION: 62 SWAB TOPICAL at 07:46

## 2023-06-28 RX ADMIN — Medication 2 SPRAY: at 07:47

## 2023-06-28 RX ADMIN — APIXABAN 2.5 MG: 2.5 TABLET, FILM COATED ORAL at 07:45

## 2023-06-28 RX ADMIN — LORATADINE 10 MG: 10 TABLET ORAL at 07:44

## 2023-06-28 RX ADMIN — ROFLUMILAST 500 MCG: 500 TABLET ORAL at 07:52

## 2023-06-28 RX ADMIN — LEVALBUTEROL HYDROCHLORIDE 1.25 MG: 1.25 SOLUTION RESPIRATORY (INHALATION) at 07:52

## 2023-06-28 RX ADMIN — TIOTROPIUM BROMIDE AND OLODATEROL 2 PUFF: 3.124; 2.736 SPRAY, METERED RESPIRATORY (INHALATION) at 07:54

## 2023-06-28 RX ADMIN — BUTALBITAL, ACETAMINOPHEN, AND CAFFEINE 2 TABLET: 50; 325; 40 TABLET ORAL at 07:45

## 2023-06-28 RX ADMIN — OXYMETAZOLINE HYDROCHLORIDE 2 SPRAY: 0.05 SPRAY NASAL at 07:49

## 2023-06-28 RX ADMIN — PREDNISONE 40 MG: 20 TABLET ORAL at 07:44

## 2023-06-28 RX ADMIN — LEVALBUTEROL HYDROCHLORIDE 1.25 MG: 1.25 SOLUTION RESPIRATORY (INHALATION) at 11:33

## 2023-06-28 RX ADMIN — LANSOPRAZOLE 15 MG: 15 CAPSULE, DELAYED RELEASE ORAL at 05:58

## 2023-06-28 NOTE — PLAN OF CARE
Problem: Potential for Compromised Skin Integrity  Goal: Nutritional status is improving  Description: INTERVENTIONS:  1. Monitor and assess patient for malnutrition (ex- brittle hair, bruises, dry skin, pale skin and conjunctiva, muscle wasting, smooth red tongue, and disorientation)  2. Monitor patient's weight and dietary intake as ordered or per policy  3. Determine patient's food preferences and provide high-protein, high-caloric foods as appropriate  4. Assist patient with eating   5. Allow adequate time for meals   6. Encourage patient to take dietary supplement as ordered   7. Collaborate with dietitian  8. Include patient/family/caregiver in decisions related to nutrition  Outcome: Progressing  Goal: MOBILITY IS MAINTAINED OR IMPROVED  Description: INTERVENTIONS  1. Collaborate with interdisciplinary team and initiate plan and interventions as ordered (PT/OT)  2. Encourage ambulation  3. Up to chair for meals  4. Monitor for signs of deconditioning  Outcome: Progressing     Problem: Safety Adult - Fall  Goal: Free from fall injury  Description: INTERVENTIONS:    Inpatient - Please reference Cares/Safety Flowsheet under [facility] Fall Risk for interventions.  Pediatrics - Please reference Peds Daily Cares/Safety Flowsheet under [facility] Pediatric Fall Assessment Fall Bundle for interventions  LD/OB - Please reference OB Shift Screening Flowsheet under OB Fall Risk for interventions.  Outcome: Progressing     Problem: Respiratory - Adult  Goal: Achieves optimal ventilation and oxygenation  Description: INTERVENTIONS:  1. Assess for changes in respiratory status  2. Assess for changes in mentation and behavior  3. Position to facilitate oxygenation and minimize respiratory effort  4. Oxygen supplementation based on oxygen saturation or ABGs  5. Assess patient's ability to cough effectively  6. Encourage broncho-pulmonary hygiene including cough, deep breathe  7. Assess the need for suctioning   8. Assess and  instruct to report SOB or any respiratory difficulty  9. Respiratory Therapy support as indicated, including medications and treatment.  Outcome: Progressing     Problem: Potential for Compromised Skin Integrity  Goal: Skin Integrity is Maintained or Improved  Description: INTERVENTIONS:  1. Assess and monitor skin integrity  2. Collaborate with interdisciplinary team and initiate plans and interventions as needed  3. Alternate a full bath with partial baths for elderly   4. Monitor patient's hygiene practices   5. Collaborate with wound, ostomy, and continence nurse  Outcome: Adequate for Discharge     Problem: Urinary Incontinence  Goal: Perineal skin integrity is maintained or improved  Description: INTERVENTIONS:  1. Assess genitourinary system, perineal skin, labs (urinalysis), and history of incontinence to include past management, aggravating, and alleviating factors  2. Collaborate with interdisciplinary team including wound, ostomy, and continence nurse and initiate plans and interventions as needed  4. Consider urine containment device  5. Apply skin protectant   6. Develop skin care regimen  7. Provide privacy when changing patient's incontinence device to maintain their dignity  Outcome: Adequate for Discharge

## 2023-06-28 NOTE — INTERDISCIPLINARY/THERAPY
Case Management Discharge Note    Phone # [phone]    Discharge Disposition: Home     Needs transportation assistance at DC: CM confirmed with the patient's nurse that the patient has a ride home.    Specialty Referrals:          Active Ambulatory Referrals   (From admission, onward)                None            Support System Notified: Patient to notify.    Narrative: CM meet with the patient.  CM confirmed with the patient that the patient's family can provide 24/7 care.  Lead CM notified.    CM called the patient's daughter.  CM confirmed with the patient's daughter Hetal Chavarria that they could provide the patient 24/7 care. Lead CM notified.    Hetal has questions about the patient's plan of care for the patient.  CM notifies the Lead CM to have Dr. Weems call Hetal to answer her questions.

## 2023-06-28 NOTE — DISCHARGE INSTRUCTIONS
Combivent - use as needed when your breathing is bad    Spiriva - use every day, two puffs daily    Wixela - use every day, use 1 puff twice a day    You will have a prednisone taper to complete when you go home. You were on higher doses of steroids in the hospital

## 2023-06-28 NOTE — NURSING END OF SHIFT
Nursing End of Shift Summary:    Patient: Denita Spring  MRN: [id number]  : 1960, Age: 63 y.o.    Location: [address] [place] [address]    Nursing Goals  Patient will maintain oxygen saturation above 90% during shift. She will report improvement of pain on her R knee.     Narrative Summary of Progress Toward Clinical Goals:  Patient is great poarticipating on her care. She maintained O2 above 90% at 4L NC. Lidocaine patch applied to her R knee due to pain 9/10 with improvement of discomfort.     Barriers to Goals/Nursing Concerns:  No    New Patient or Family Concerns/Issues:  No    Shift Summary:   Significant Events & Communications to Providers (last 12 hours)       Last 5 Values    No documentation.                 Oxygen Usage (last 12 hours)       Last 5 Values    No documentation.                 Mobility (last 12 hours)       Last 5 Values       Row Name 23 2100 23 2200 23 2322 23 0100 23 0738       Mobility    Activity -- Turn Chair position in bed Sleeping  reposition herself --    Level of Assistance -- Minimal assist, patient does 75% or more Minimal assist, patient does 75% or more -- --    Length of Time in Chair (min) -- 0 -- -- --    Distance Ambulated (feet) -- 0 Feet -- -- --    Distance Ambulated (Meters Calculated) -- 0 Meters -- -- --    Patient Position Sitting -- Sitting -- Sitting    Turning -- Turns self -- -- --    Distance Ambulated (Meters Calculated)(Do Not Use) -- 0 Feet -- -- --                  Urethral Catheter       Active Urethral Catheter       None                  Active Lines       Active Central venous catheter / Peripherally inserted central catheter / Implantable Port / Hemodialysis catheter / Midline Catheter       Name Placement date Placement time Site Days    Midline Peripheral 23 Right Brachial 23  1725  Brachial  5                  Infusing Medications   Medication Dose Last Rate     PRN Medications   Medication Dose Last Admin     lidocaine  2 patch 2 patch at 06/27/23 2102    levalbuterol (XOPENEX) nebulizer solution 1.25 mg orderable  1.25 mg 1.25 mg at 06/25/23 2311    sodium chloride  1 spray      butalbital-acetaminophen-caff  2 tablet 2 tablet at 06/28/23 0745    sodium chloride  10 mL 10 mL at 06/23/23 2103    bisacodyL  10 mg      sodium phosphates  1 enema      acetaminophen  650 mg 650 mg at 06/27/23 1538    albuterol  2.5 mg 2.5 mg at 06/26/23 2133

## 2023-06-28 NOTE — INTERDISCIPLINARY/THERAPY
Spiritual Care Services ([facility])   06/28/23 4092   Clinical Encounter Type   Visited With Staff with patient   Jehovah's witness Affilation   Affiliated with [employer] Yes   Current [employer] Affiliation [employer]   Spiritual/Emotional Distress   Level Unable to assess   Plan of Care   Spiritual Care Plan Initiated Provide supportive presence   Spiritual Assessment   Completed by    Shilpa Beliefs Believes in a Higher Power     Staff with pt when  rounded.  Silent prayer issued.   Spiritual Care Services ([facility]) remain available.

## 2023-06-28 NOTE — INTERDISCIPLINARY/THERAPY
"  [address] [facility] [address]  [phone]      Physical Therapy Daily Treatment Note      Date of Service: 06/28/23  Patient Name: Denita Spring  Referring Provider: BRONWYN BURR  Visit Number: 3   Start of Care Date: 06/26/23  Certification Period: 09/24/23      Medical Diagnosis:  Acute respiratory failure (CMS/HCC) [J96.00]    Treatment Diagnoses:   Problem List: Decreased strength, Decreased endurance, Impaired balance, Decreased mobility    Subjective     Therapy Treatment Diagnosis: impaired mobility with COPD exacerbation, s/p extubation       RN approved PT services, noting pt.increases to 6l 02 with mobility at baseline, 02 mask in room. Pt found in bed and agreeable to PT, has \"only pivoted to commode so far\". Gait belt donned for mobility. Pt left in chair with call light in reach, needs met, and alarm on.       FALL RISK Interventions:Fall Risk Bundle  Pain:   Pain Assessment Scale  0-10 Pain Score: 0 - No pain  Pain Location:  (later notes chronic R knee pain, \"weak, leidy, can't stand on R leg\")         Objective      Precautions: Other Precautions: fall risk, WC bound, cannot fully extend R knee, which leidy in standing    Cognition: WFL, pt.directs cares    Bed Mobility: standby assist to sit up to edge of bed, cues to watch for lines.    Transfers: Declines walker, \"don't trust them.\"  Performs low pivot to chair, cues for safety with hand placement, 02 tubing managed. Sits edge of bed with standby assist, x5 min, as switched over to 02 mask for mobility, and set up chair;  hands slightly tremorous    Ambulation: just a few lateral scoots on L foot with pivot, mostly keeps NWB on R LE, due to chronic weakness/pain.     Balance: good sitting balance;  steady with pivot, but Contact Guard Assist for safety/lines.    Other Activities: Performs seated quad arcs, ankle pumps, to prepare to mobilize;  just a few reps, \"it will wear me out too much.\"  encouraged more time out of " "bed, chair for meals.  Denita requests bedside table be left in front of her, to lean trunk forward for easier breathing.    Activity Tolerance: on 4l 02 at rest. Increased to 6l with moibility, patient SOB, \"feels like my lungs will explode\" with mobility, sa02 90-94% post-treatment on 6l, but does not want 02 turned back to 4l yet;  nursing alerted.  Tachy, HR  observed.                                                                         Time Calculation  Start Time: 1025  Stop Time: 1045  Time Calculation (min): 20 min   Therapeutic Interventions (Time Spent in Minutes)  Gait/Mobility: 8  Therapeutic Activity: 12                   ASSESSMENT:  1-asst for pivots for safety, pt.feels close to baseline, needing 6l with mobility. Will benefit from more time out of bed, chair for meals.    PLAN FOR NEXT TREATMENT SESSION:      Recommendation  Recommendations for Therapy: Continue skilled therapy  Equipment Recommended:  (has wheelchair, does not use walker for transfers)    Plan Comment: 1-asst. Low Pivots (as at baseline), CGA, standing as able, LE ROM    Thank you for allowing us to share in the care of this patient. If you have any questions, recommendations, or further concerns regarding this patient, please feel free to contact me at [address] [place] [id number]  Dept: [phone].  Signed by: Mali Welch, PT  6/28/2023  12:28 PM    "

## 2023-06-28 NOTE — PLAN OF CARE
Problem: Knowledge Deficit  Goal: Patient/family/caregiver demonstrates understanding of disease process, treatment plan, medications, and discharge instructions  Description: INTERVENTIONS:   1. Complete learning assessment and assess knowledge base  2. Provide teaching at level of understanding   3. Provide teaching via preferred learning methods  Outcome: Progressing     Problem: Potential for Compromised Skin Integrity  Goal: Nutritional status is improving  Description: INTERVENTIONS:  1. Monitor and assess patient for malnutrition (ex- brittle hair, bruises, dry skin, pale skin and conjunctiva, muscle wasting, smooth red tongue, and disorientation)  2. Monitor patient's weight and dietary intake as ordered or per policy  3. Determine patient's food preferences and provide high-protein, high-caloric foods as appropriate  4. Assist patient with eating   5. Allow adequate time for meals   6. Encourage patient to take dietary supplement as ordered   7. Collaborate with dietitian  8. Include patient/family/caregiver in decisions related to nutrition  Outcome: Progressing     Problem: Safety Adult - Fall  Goal: Free from fall injury  Description: INTERVENTIONS:    Inpatient - Please reference Cares/Safety Flowsheet under [facility] Fall Risk for interventions.  Pediatrics - Please reference Peds Daily Cares/Safety Flowsheet under [facility] Pediatric Fall Assessment Fall Bundle for interventions  LD/OB - Please reference OB Shift Screening Flowsheet under OB Fall Risk for interventions.  Outcome: Progressing     Problem: Respiratory - Adult  Goal: Achieves optimal ventilation and oxygenation  Description: INTERVENTIONS:  1. Assess for changes in respiratory status  2. Assess for changes in mentation and behavior  3. Position to facilitate oxygenation and minimize respiratory effort  4. Oxygen supplementation based on oxygen saturation or ABGs  5. Assess patient's ability to cough effectively  6. Encourage  broncho-pulmonary hygiene including cough, deep breathe  7. Assess the need for suctioning   8. Assess and instruct to report SOB or any respiratory difficulty  9. Respiratory Therapy support as indicated, including medications and treatment.  Outcome: Progressing

## 2023-06-28 NOTE — NURSING NOTE
06/27/23 1928   Provider Notification   Reason for Communication Patient request   Provider Name Eva Huston PA-C   Provider Role Hospitalist   Method of Communication Secure Chat  (Patient is complaining of R knee pain 9/10 she only has tylenol for pain, can we order like a lidocaine patch maybe? She has so many allergies.)   Response See orders

## 2023-06-28 NOTE — DISCHARGE SUMMARY
Patient Name: Denita Spirng  : 1960  Medical Record Number: [id number]  Primary Care Provider: [facility] [place]  Admit Date: 2023  Discharge Date: 2023  Length of Stay: 6  Admitting Provider: Michael Rivas MD Discharge Provider: Eva Weems MD    Admitting Diagnosis:   Acute respiratory failure (CMS/[facility])    Final Diagnoses:   Acute on chronic respiratory failure  COPD exacerbation  Paroxysmal atrial fibrillation  Sinus tachycardia  Weakness and debility    Hospital Course:    Ms. Spring is a 63-year-old woman with a past medical history of severe COPD, paroxysmal atrial fibrillation, sinus tachycardia.  She presented to [facility] with severe respiratory distress on 2023.  She required intubation and was transferred here for higher level of care.  She was extubated on  in an attempt to avoid ventilator dependence.  She was attempted to be managed with BiPAP but was not tolerant of this.  She is able to tolerate high flow nasal cannula and did not require reintubation.  Pulmonary status slowly improved over the next several days.  She was initially started on high-dose methylprednisone, switch to prednisone with plan for prednisone taper on discharge.  At discharge she was resumed on Spiriva and Wixela per patient's formulary.  She is at 4 L of oxygen at rest and 6 L of oxygen with activity at baseline, discharged with these oxygen needs.  She had some significant degree of physical debility requiring assistance to go to bed, per patient's family she is at her baseline and they were comfortable providing her with a physical assistance she needed.    Anticoagulation with Eliquis was continued this hospital stay.  Per patient, she was recently diagnosed with paroxysmal atrial fibrillation provoked during renal infection, and started on anticoagulation.  Diltiazem was noted to be in her home medication list, initially held but resumed prior to discharge due to sinus tachycardia.   Per records, had been started for sinus tachycardia in the past.  Hospital course additionally complicated by intermittent headache, with pain resolved by Fioricet.    Medication Changes:  Prednisone taper    Pending Results:  None    Recommended Follow up:  Ensure she has proper access to medications for COPD and asthma    Hospital Course by Problem List:   Active Hospital Problems    Diagnosis Date Noted    *Acute respiratory failure (CMS/[facility]) 12/02/2017    Paroxysmal atrial fibrillation (CMS/HCC) 06/28/2023    COPD exacerbation (CMS/HCC) 12/02/2017    Acute on chronic respiratory failure with hypercapnia (CMS/HCC) 12/02/2017      Resolved Hospital Problems       Consultants:  Critical care    Procedures:  None    Condition at Discharge:   Stable    Final Exam:  General: Alert, not in apparent distress  HEENT: Conjunctivae clear, no scleral icterus  Cardiac: Tachycardic, regular rate and rhythm  Respiratory: Breathing comfortably on nasal cannula, no wheezing  Abdomen: Soft, nontender  Extremities: No leg edema  Skin: No rashes on exposed skin  Neuro: Fluent speech    Disposition/Plans for Post-Hospital Care/Assistance: 01 - Home or Self-Care    Medication Instructions Given to the Patient at Discharge:  Discharge Medication List as of 6/28/2023  1:22 PM        START taking these medications    Details   butalbital-acetaminophen-caff (FIORICET, ESGIC) -40 mg Take 1 tablet by mouth every 4 (four) hours as needed for headaches or migraine for up to 10 days, Starting Wed 6/28/2023, Until Sat 7/8/2023 at 2359, Normal      predniSONE 5 mg tablet Multiple Dosages:Starting Wed 6/28/2023, Until Fri 6/30/2023 at 2359, THEN Starting Sat 7/1/2023, Until Mon 7/3/2023 at 2359, THEN Starting Tue 7/4/2023, Until Thu 7/6/2023 at 2359, THEN Starting Fri 7/7/2023, Until Sun 7/9/2023 at 2359, THEN Starti ng Mon 7/10/2023, Until Wed 7/12/2023 at 2359Take 8 tablets (40 mg total) by mouth daily for 3 days, THEN 4 tablets (20 mg  total) daily for 3 days, THEN 2 tablets (10 mg total) daily for 3 days, THEN 1 tablet (5 mg total) daily for 3 days, THEN 0.5 tabl ets (2.5 mg total) daily for 3 days., Normal           Discharge Medication List as of 6/28/2023  1:22 PM        CONTINUE these medications which have NOT CHANGED    Details   apixaban (ELIQUIS) 2.5 mg tablet Take 1 tablet (2.5 mg total) by mouth 2 (two) times a day, Historical Med      bacitracin 500 unit/gram ointment Apply 1 Application topically 2 (two) times a day, Historical Med      cholecalciferol, vitamin D3, (cholecalciferol) 25 mcg (1,000 unit) tablet Take 1 tablet (1,000 Units total) by mouth daily, Historical Med      dilTIAZem (TIAZAC) 180 mg 24 hr capsule Take 1 capsule (180 mg total) by mouth daily, Historical Med      roflumilast (DALIRESP) 500 mcg tablet Take 1 tablet (500 mcg total) by mouth daily, Historical Med      loratadine (CLARITIN) 10 mg tablet Take 1 tablet (10 mg total) by mouth daily, Historical Med      albuterol HFA (Proventil HFA) 90 mcg/actuation inhaler Inhale 2 puffs every 6 (six) hours as needed for wheezing or shortness of breath, Historical Med      fexofenadine (ALLEGRA) 180 mg tablet Take 1 tablet (180 mg total) by mouth daily, Historical Med      carboxymethylcellulose-glycern (REFRESH OPTIVE) 1-0.9 % drops,gel Administer 1 drop into both eyes 4 (four) times a day as needed for dry eyes, Historical Med      ipratropium-albuteroL (COMBIVENT RESPIMAT)  mcg/actuation inhaler Inhale 1 puff 4 (four) times a day, Starting Mon 5/1/2023, Normal      theophylline (THEODUR) 200 mg 12 hr tablet Take 1 tablet (200 mg total) by mouth 2 (two) times a day, Starting Mon 5/1/2023, Until Tue 4/30/2024, Normal      calcium carbonate EX (TUMS EX) 300 mg (750 mg) chewable tablet Take 1 tablet (750 mg total) by mouth every 2 (two) hours as needed for heartburn, Historical Med      methyl salicylate-menthol 15-10 % cream See administration instructions Apply a  sufficient quantity to the affected area four times a day as directed for muscle or joint pain. **wash hands after use**, Historical Med      sodium chloride (OCEAN) 0.65 % nasal spray Administer 1-2 mL (1-2 sprays total) into each nostril 4 (four) times a day, Historical Med      ferrous sulfate 325 mg (65 mg iron) tablet Take 1 tablet (325 mg total) by mouth daily, Historical Med      omega-3 fatty acids-fish oil 340-1,000 mg capsule Take 1,000 mg by mouth daily., Historical Med      guaiFENesin (MUCINEX) 600 mg 12 hr tablet Take 1 tablet (600 mg total) by mouth 3 (three) times a day as needed for cough, Historical Med      simethicone (MYLICON) 80 mg chewable tablet Take 1 tablet (80 mg total) by mouth 4 (four) times a day as needed for flatulence, Historical Med      acetaminophen (TYLENOL) 325 mg tablet Take 2 tablets (650 mg total) by mouth 4 (four) times a day as needed, Historical Med      albuterol 2.5 mg /3 mL nebulizer solution Take 3 mL (2.5 mg total) by nebulization every 6 (six) hours as needed for wheezing., Starting Tue 12/5/2017, Normal      fluticasone (FLONASE) 50 mcg/actuation nasal spray Administer 1 spray into each nostril 2 (two) times a day Not to exceed 2 sprays in each nostril daily., Starting Mon 3/16/2015, Historical Med      omeprazole (PriLOSEC) 20 mg capsule Take 2 capsules (40 mg total) by mouth daily, Starting Thu 1/9/2014, Historical Med      montelukast (SINGULAIR) 10 mg tablet Take 1 tablet (10 mg total) by mouth nightly, Starting Mon 5/6/2013, Historical Med      ascorbic acid, vitamin C, (VITAMIN C) 500 mg tablet Take 1 tablet (500 mg total) by mouth 2 (two) times a day, Starting Wed 3/14/2012, Historical Med      docusate sodium (COLACE) 100 mg capsule Take 100 mg by mouth 2 (two) times a day as needed. Takes after lunch and at bedtime , Starting Wed 3/14/2012, Historical Med      multivitamin (THERAGRAN) tablet tablet Take 1 tablet by mouth daily with lunch.  , Starting Wed  3/14/2012, Historical Med           Discharge Medication List as of 6/28/2023  1:22 PM        CONTINUE these medications which have CHANGED    Details   fluticasone propion-salmeteroL (Wixela Inhub) 500-50 mcg/dose diskus inhaler Inhale 1 puff 2 (two) times a day Rinse mouth with water after use. Do not swallow., Starting Wed 6/28/2023, Normal      tiotropium bromide (SPIRIVA RESPIMAT) 2.5 mcg/actuation mist Inhale 2 puffs daily, Starting Wed 6/28/2023, Until Fri 7/28/2023, Normal           Discharge Medication List as of 6/28/2023  1:22 PM          Diet: regular diet    Pertinent Diagnostic Results:  Exam:   Portable chest, single view 06/22/2023     Clinical History:  Shortness of breath;      Comparison(s):  7/24/2018     Findings:  Hyperinflated lungs with emphysematous changes are present bilaterally . no infiltrates. Heart size is stable. No pneumothorax or pleural effusions . bones are stable.        IMPRESSION:   Emphysematous changes but no acute abnormality.    [facility]  Total time on evaluation of the patient and arrangement of discharge was >35 minutes    Eva Weems MD  6/28/2023  4:44 PM    Copies of this summary should be routed to the following:  Primary Care Provider: [facility]

## 2023-06-28 NOTE — NURSING NOTE
Patient verbalized understanding of d/c papers, prescriptions and follow up. Son is here to take home. All IV access removed and telemetry taken off. Signed patient belongings form. Taken to car by wheelchair and patient transport.

## 2023-06-29 ENCOUNTER — PATIENT OUTREACH (OUTPATIENT)
Dept: FAMILY MEDICINE | Facility: HOSPITAL | Age: 63
End: 2023-06-29
Payer: COMMERCIAL

## 2023-06-30 NOTE — PROGRESS NOTES
Denita Pedrazaon was contacted following recent hospitalization at [facility]. The patient was discharged from the hospital on 6/28/2023 .The Discharge Summary and After Visit Summary were reviewed. The patient's main diagnosis during the hospitalization was Lung failure causing loss of breath.  Followup appointment: 06/29/23. Any additional testing and labs will be discussed at the patient's upcoming post-hospital follow up appointment.    Transitional Care Management Follow Up (most recent)       Transitional Care Management - 06/30/23 1245          OTHER    Date of post hospital outreach 06/30/23     Contact Status Contact done     Speaking with the Patient or Patient's Caregiver? Patient     Is the patient on the Diabetes registry? N     Were patient's home medications changed or did they have any new medications added during the hospitalization? Y     Did someone go over the discharge summary with the patient or the patient's caregiver and discuss the medications listed on it? Y     Does the patient or patient's caregiver have any questions about the medication changes? N     Patient verbalized understanding of when to contact health care provider or when to get help right away? Y     Discharge instructions reviewed with patient or patient's caregiver and all questions answered? Y     Is there a Home Health/DME Plan being enacted? Please note name of HH agency, DME vendor, contacted/received N     Does patient have psychosocial issues that might impact their health status? None     Does patient have financial barriers to care? None                      Thais Mccabe RN  June 30, 2023 12:47 PM

## 2023-07-10 ENCOUNTER — ANCILLARY PROCEDURE (OUTPATIENT)
Dept: RADIOLOGY | Facility: HOSPITAL | Age: 63
End: 2023-07-10
Payer: COMMERCIAL

## 2023-07-10 DIAGNOSIS — R06.02 SHORTNESS OF BREATH: ICD-10-CM
